# Patient Record
Sex: FEMALE | Race: WHITE | NOT HISPANIC OR LATINO | Employment: PART TIME | URBAN - METROPOLITAN AREA
[De-identification: names, ages, dates, MRNs, and addresses within clinical notes are randomized per-mention and may not be internally consistent; named-entity substitution may affect disease eponyms.]

---

## 2017-01-06 DIAGNOSIS — J45.909 ASTHMA: ICD-10-CM

## 2017-01-06 DIAGNOSIS — J30.81 ALLERGIC RHINITIS DUE TO ANIMALS: Primary | ICD-10-CM

## 2017-01-09 RX ORDER — MONTELUKAST SODIUM 10 MG/1
TABLET ORAL
Qty: 30 TABLET | Refills: 11 | Status: SHIPPED
Start: 2017-01-09 | End: 2018-10-08

## 2017-01-12 DIAGNOSIS — B37.31 CANDIDIASIS OF VULVA AND VAGINA: Primary | ICD-10-CM

## 2017-01-12 RX ORDER — FLUCONAZOLE 150 MG/1
TABLET ORAL
Qty: 3 TABLET | Refills: 0 | Status: SHIPPED | OUTPATIENT
Start: 2017-01-12 | End: 2017-02-03

## 2017-01-24 ENCOUNTER — CARE COORDINATION (OUTPATIENT)
Dept: PULMONOLOGY | Facility: CLINIC | Age: 32
End: 2017-01-24

## 2017-01-24 DIAGNOSIS — J47.9 BRONCHIECTASIS WITHOUT ACUTE EXACERBATION (H): Primary | ICD-10-CM

## 2017-01-24 RX ORDER — PREDNISONE 10 MG/1
TABLET ORAL
Qty: 20 TABLET | Refills: 0 | Status: SHIPPED | OUTPATIENT
Start: 2017-01-24 | End: 2017-02-10

## 2017-01-24 NOTE — PROGRESS NOTES
[facility]  January 24, 2017    Kehr, Kristen M    CF Provider: Kade Luu MD    Caller: Patient     Clinical information:  Michelle Epstein called with complaint of waking up with a cold this morning and having difficulty breathing and her cough is worse. She is requesting prednisone at this time.       Plan: Prednisone 30 mg for 3 days, 20 mg for 3 days, 10 mg for 3 days, 5 mg for 3 days, then stop.      Call back with any new or worsening symptoms/concerns.    Caller verbalized understanding of plan and agrees with advice given.

## 2017-01-27 ENCOUNTER — OFFICE VISIT (OUTPATIENT)
Dept: OBGYN | Facility: OTHER | Age: 32
End: 2017-01-27
Payer: COMMERCIAL

## 2017-01-27 VITALS
HEART RATE: 88 BPM | WEIGHT: 176 LBS | SYSTOLIC BLOOD PRESSURE: 98 MMHG | DIASTOLIC BLOOD PRESSURE: 70 MMHG | HEIGHT: 65 IN | BODY MASS INDEX: 29.32 KG/M2

## 2017-01-27 DIAGNOSIS — Z00.00 ROUTINE GENERAL MEDICAL EXAMINATION AT A HEALTH CARE FACILITY: Primary | ICD-10-CM

## 2017-01-27 DIAGNOSIS — Z30.011 ENCOUNTER FOR INITIAL PRESCRIPTION OF CONTRACEPTIVE PILLS: ICD-10-CM

## 2017-01-27 DIAGNOSIS — Z32.00 PREGNANCY EXAMINATION OR TEST, PREGNANCY UNCONFIRMED: ICD-10-CM

## 2017-01-27 LAB — BETA HCG QUAL IFA URINE: NEGATIVE

## 2017-01-27 PROCEDURE — 87624 HPV HI-RISK TYP POOLED RSLT: CPT | Performed by: OBSTETRICS & GYNECOLOGY

## 2017-01-27 PROCEDURE — G0145 SCR C/V CYTO,THINLAYER,RESCR: HCPCS | Performed by: OBSTETRICS & GYNECOLOGY

## 2017-01-27 PROCEDURE — 84703 CHORIONIC GONADOTROPIN ASSAY: CPT | Performed by: OBSTETRICS & GYNECOLOGY

## 2017-01-27 PROCEDURE — 99385 PREV VISIT NEW AGE 18-39: CPT | Performed by: OBSTETRICS & GYNECOLOGY

## 2017-01-27 RX ORDER — NORETHINDRONE ACETATE AND ETHINYL ESTRADIOL 1MG-20(21)
1 KIT ORAL DAILY
Qty: 84 TABLET | Refills: 0 | Status: SHIPPED | OUTPATIENT
Start: 2017-01-27 | End: 2017-04-06

## 2017-01-27 ASSESSMENT — PAIN SCALES - GENERAL: PAINLEVEL: NO PAIN (0)
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
## 2017-01-27 NOTE — NURSING NOTE
"Chief Complaint   Patient presents with     Physical     Pap is due-  remind about Asthma Health Maint. -- already had flu shot       Initial BP 98/70 mmHg  Pulse 88  Ht 5' 5\" (1.651 m)  Wt 176 lb (79.833 kg)  BMI 29.29 kg/m2 Estimated body mass index is 29.29 kg/(m^2) as calculated from the following:    Height as of this encounter: 5' 5\" (1.651 m).    Weight as of this encounter: 176 lb (79.833 kg).  BP completed using cuff size: regular      "

## 2017-01-27 NOTE — PATIENT INSTRUCTIONS
PREVENTIVE HEALTH RECOMMENDATIONS:   Get a physical every year.  A pap test is important to have done starting at age 21 and then every three years as long as your pap is normal.  When you receive the results of your pap test it will include when you need to have your next pap test.    You should be tested each year for chlamydia and gonorrhea if you are aged 16-25 and if you have had a new sexual partner since you were last tested.   Vaccines: Get a flu shot each year.   Eat at least 8-10 servings of fruits and vegetables daily.  Eat whole-grain bread and cereal, whole-wheat pasta and brown rice instead of white grains and white rice.   For bone health: Eat calcium-rich foods (dairy products) or take calcium pills (500 to 600 mg with vitamin D) twice a day with food.   Exercise for an average of 30 minutes a day, 5 days of the week. It can be as simple as taking a brisk walk.  This will help you control your weight and prevent many diseases.   Limit alcohol to one drink per day.   Don't smoke and limit your exposure to second hand smoke.  If you smoke consider making a plan to quit. Go to sones and clink on   [employer]  for help   Wear sunscreen with at least SPF15 to prevent skin damage and skin cancer.   Brush your teeth twice a day and floss once a day and see your dentist twice a year for an exam and cleaning.   Have a great year and I will look forward to seeing you next year.   Myranda Glass, DO

## 2017-01-31 DIAGNOSIS — Z00.00 ROUTINE GENERAL MEDICAL EXAMINATION AT A HEALTH CARE FACILITY: ICD-10-CM

## 2017-01-31 LAB
ALBUMIN SERPL-MCNC: 3.4 G/DL (ref 3.4–5)
ALP SERPL-CCNC: 39 U/L (ref 40–150)
ALT SERPL W P-5'-P-CCNC: 19 U/L (ref 0–50)
ANION GAP SERPL CALCULATED.3IONS-SCNC: 6 MMOL/L (ref 3–14)
AST SERPL W P-5'-P-CCNC: 7 U/L (ref 0–45)
BILIRUB SERPL-MCNC: 0.3 MG/DL (ref 0.2–1.3)
BUN SERPL-MCNC: 12 MG/DL (ref 7–30)
CALCIUM SERPL-MCNC: 8.4 MG/DL (ref 8.5–10.1)
CHLORIDE SERPL-SCNC: 108 MMOL/L (ref 94–109)
CHOLEST SERPL-MCNC: 140 MG/DL
CO2 SERPL-SCNC: 31 MMOL/L (ref 20–32)
CREAT SERPL-MCNC: 0.86 MG/DL (ref 0.52–1.04)
ERYTHROCYTE [DISTWIDTH] IN BLOOD BY AUTOMATED COUNT: 16.1 % (ref 10–15)
GFR SERPL CREATININE-BSD FRML MDRD: 76 ML/MIN/1.7M2
GLUCOSE SERPL-MCNC: 89 MG/DL (ref 70–99)
HCT VFR BLD AUTO: 38.6 % (ref 35–47)
HDLC SERPL-MCNC: 42 MG/DL
HGB BLD-MCNC: 12.2 G/DL (ref 11.7–15.7)
LDLC SERPL CALC-MCNC: 71 MG/DL
MCH RBC QN AUTO: 28.4 PG (ref 26.5–33)
MCHC RBC AUTO-ENTMCNC: 31.6 G/DL (ref 31.5–36.5)
MCV RBC AUTO: 90 FL (ref 78–100)
NONHDLC SERPL-MCNC: 98 MG/DL
PLATELET # BLD AUTO: 284 10E9/L (ref 150–450)
POTASSIUM SERPL-SCNC: 4.1 MMOL/L (ref 3.4–5.3)
PROT SERPL-MCNC: 6.7 G/DL (ref 6.8–8.8)
RBC # BLD AUTO: 4.29 10E12/L (ref 3.8–5.2)
SODIUM SERPL-SCNC: 145 MMOL/L (ref 133–144)
TRIGL SERPL-MCNC: 137 MG/DL
TSH SERPL DL<=0.005 MIU/L-ACNC: 2.07 MU/L (ref 0.4–4)
WBC # BLD AUTO: 10.2 10E9/L (ref 4–11)

## 2017-01-31 PROCEDURE — 85027 COMPLETE CBC AUTOMATED: CPT | Performed by: OBSTETRICS & GYNECOLOGY

## 2017-01-31 PROCEDURE — 36415 COLL VENOUS BLD VENIPUNCTURE: CPT | Performed by: OBSTETRICS & GYNECOLOGY

## 2017-01-31 PROCEDURE — 80053 COMPREHEN METABOLIC PANEL: CPT | Performed by: OBSTETRICS & GYNECOLOGY

## 2017-01-31 PROCEDURE — 80061 LIPID PANEL: CPT | Performed by: OBSTETRICS & GYNECOLOGY

## 2017-01-31 PROCEDURE — 84443 ASSAY THYROID STIM HORMONE: CPT | Performed by: OBSTETRICS & GYNECOLOGY

## 2017-02-01 LAB
COPATH REPORT: NORMAL
PAP: NORMAL

## 2017-02-01 ASSESSMENT — ACTIVITIES OF DAILY LIVING (ADL)
ARE_THERE_CARBON_MONOXIDE_DETECTORS_IN_YOUR_HOME?: Y
ARE_THERE_SMOKE_DETECTORS_IN_YOUR_HOME?: Y
DO_MEMBERS_OF_YOUR_HOUSEHOLD_USE_SUNSCREEN?: Y
ARE_THERE_FIREARMS_IN_YOUR_HOME?: N
DO_MEMBERS_OF_YOUR_HOUSEHOLD_USE_SAFETY_HELMETS?: Y
DO_MEMBERS_OF_YOUR_HOUSEHOLD_WEAR_SEAT_BELTS?: Y

## 2017-02-01 ASSESSMENT — ENCOUNTER SYMPTOMS
TASTE DISTURBANCE: 0
HOARSE VOICE: 0
SMELL DISTURBANCE: 0
HEMOPTYSIS: 0
SPUTUM PRODUCTION: 1
RESPIRATORY PAIN: 0
SINUS PAIN: 1
WHEEZING: 1
SHORTNESS OF BREATH: 0
NECK MASS: 0
TROUBLE SWALLOWING: 0
COUGH DISTURBING SLEEP: 1
SINUS CONGESTION: 1
SORE THROAT: 0
DYSPNEA ON EXERTION: 1
COUGH: 1
POSTURAL DYSPNEA: 1
SNORES LOUDLY: 0

## 2017-02-02 LAB
FINAL DIAGNOSIS: NORMAL
HPV HR 12 DNA CVX QL NAA+PROBE: NEGATIVE
HPV16 DNA SPEC QL NAA+PROBE: NEGATIVE
HPV18 DNA SPEC QL NAA+PROBE: NEGATIVE
SPECIMEN DESCRIPTION: NORMAL

## 2017-02-03 DIAGNOSIS — B37.31 YEAST INFECTION OF THE VAGINA: Primary | ICD-10-CM

## 2017-02-06 RX ORDER — FLUCONAZOLE 150 MG/1
TABLET ORAL
Qty: 3 TABLET | Refills: 0 | Status: SHIPPED | OUTPATIENT
Start: 2017-02-06 | End: 2017-05-03

## 2017-02-09 DIAGNOSIS — J32.9 SINUSITIS, CHRONIC: Primary | ICD-10-CM

## 2017-02-09 RX ORDER — METHYLPREDNISOLONE 4 MG
TABLET, DOSE PACK ORAL
Qty: 21 TABLET | Refills: 1 | Status: SHIPPED | OUTPATIENT
Start: 2017-02-09 | End: 2017-04-05

## 2017-02-09 RX ORDER — DOXYCYCLINE 100 MG/1
100 CAPSULE ORAL 2 TIMES DAILY
Qty: 28 CAPSULE | Refills: 0 | Status: SHIPPED | OUTPATIENT
Start: 2017-02-09 | End: 2017-02-13 | Stop reason: ALTCHOICE

## 2017-02-10 ENCOUNTER — OFFICE VISIT (OUTPATIENT)
Dept: PULMONOLOGY | Facility: CLINIC | Age: 32
End: 2017-02-10
Attending: INTERNAL MEDICINE
Payer: COMMERCIAL

## 2017-02-10 VITALS
TEMPERATURE: 98.9 F | HEIGHT: 65 IN | RESPIRATION RATE: 16 BRPM | BODY MASS INDEX: 29.27 KG/M2 | HEART RATE: 109 BPM | OXYGEN SATURATION: 97 % | WEIGHT: 175.7 LBS | DIASTOLIC BLOOD PRESSURE: 88 MMHG | SYSTOLIC BLOOD PRESSURE: 128 MMHG

## 2017-02-10 DIAGNOSIS — J47.9 BRONCHIECTASIS (H): ICD-10-CM

## 2017-02-10 DIAGNOSIS — J47.9 ADULT BRONCHIECTASIS (H): Primary | ICD-10-CM

## 2017-02-10 DIAGNOSIS — J47.1 BRONCHIECTASIS WITH ACUTE EXACERBATION (H): ICD-10-CM

## 2017-02-10 LAB
BACTERIA SPEC CULT: NORMAL
MICRO REPORT STATUS: NORMAL
SPECIMEN SOURCE: NORMAL

## 2017-02-10 PROCEDURE — 87070 CULTURE OTHR SPECIMN AEROBIC: CPT | Performed by: INTERNAL MEDICINE

## 2017-02-10 PROCEDURE — 99212 OFFICE O/P EST SF 10 MIN: CPT | Mod: ZF

## 2017-02-10 RX ORDER — PREDNISONE 10 MG/1
TABLET ORAL
Qty: 63 TABLET | Refills: 0 | Status: SHIPPED
Start: 2017-02-10 | End: 2017-08-01

## 2017-02-10 RX ORDER — LEVOFLOXACIN 750 MG/1
750 TABLET, FILM COATED ORAL DAILY
Qty: 14 TABLET | Refills: 0 | Status: SHIPPED | OUTPATIENT
Start: 2017-02-10 | End: 2017-02-24

## 2017-02-10 RX ORDER — TOBRAMYCIN INHALATION SOLUTION 300 MG/5ML
300 INHALANT RESPIRATORY (INHALATION) 2 TIMES DAILY
Qty: 280 ML | Refills: 11 | Status: SHIPPED | OUTPATIENT
Start: 2017-02-10 | End: 2019-01-24

## 2017-02-10 ASSESSMENT — PAIN SCALES - GENERAL: PAINLEVEL: NO PAIN (0)

## 2017-02-10 NOTE — MR AVS SNAPSHOT
After Visit Summary   2/10/2017    Michelle Epstein    MRN: [id number]           Patient Information     Date Of Birth          1985        Visit Information        Provider Department      2/10/2017 3:50 PM Kade Luu MD [facility]        Today's Diagnoses     Adult bronchiectasis (H)    -  1     Bronchiectasis with acute exacerbation (H)           Care Instructions    Continue doxycycline and medrol dose pack.  Call the CF office Monday if you are not feeling better.    Restart RAJI nebs twice daily, on 28 days, off 28 days.    Use azithromycin whenever you are not on another oral antibiotic.      If you get sick on your [place] trip:  Levofloxacin 750mg daily for 14 days  Prednisone 10 mg tab: 60mg daily for 3 days, then 50mg daily for 3 days, then 40mg daily for 3 days, then 30mg daily for 3 days, then 20mg daily for 3 days, then 10mg daily for 3 days then stop.        Follow-ups after your visit        Follow-up notes from your care team     Return in about 3 months (around 5/10/2017).      Your next 10 appointments already scheduled     May 12, 2017  3:00 PM   CF LOOP with  PFL CF   [facility] Pulmonary Function Testing ([facility])    [address] [place] [id number] [phone]            May 12, 2017  3:50 PM   (Arrive by 3:35 PM)   RETURN CYSTIC FIBROSIS VISIT with Kade Luu MD   [facility] ([facility])    [address] [place] [id number] [phone]              Future tests that were ordered for you today     Open Future Orders        Priority Expected Expires Ordered    RESPIRATORY FLOW VOLUME LOOP Routine 5/10/2017 6/10/2017 2/10/2017            Who to contact     If you have questions or need follow up information about today's clinic visit or your schedule please contact [facility] directly at [phone].  Normal or non-critical lab and imaging results will be communicated to you by MyChart, letter or phone within 4 business days after the clinic has received the results. If you do not hear from us within 7 days, please contact the clinic through [employer] or phone. If you have a critical or abnormal lab result, we will notify you by phone as soon as possible.  Submit refill requests through [employer] or call your pharmacy and they will forward the refill request to us. Please allow 3 business days for your refill to be completed.          Additional Information About Your Visit        [employer] Information     [employer] gives you secure access to your electronic health record. If you see a primary care provider, you can also send messages to your care team and make appointments. If you have questions, please call your primary care clinic.  If you do not have a primary care provider, please call [phone] and they will assist you.        Care EveryWhere ID     This is your Care EveryWhere ID. This could be used by other organizations to access your [facility] medical records  [id number]        Your Vitals Were     Pulse Temperature Respirations Height BMI (Body Mass Index) Pulse Oximetry    109 98.9  F (37.2  C) (Oral) 16 1.651 m (5' 5\") 29.24 kg/m2 97%    Last Period                   01/01/2017            Blood Pressure from Last 3 Encounters:   02/10/17 128/88   01/27/17 98/70   10/17/16 114/79    Weight from Last 3 Encounters:   02/10/17 79.697 kg (175 lb 11.2 oz)   01/27/17 79.833 kg (176 lb)   10/17/16 81.012 kg (178 lb 9.6 oz)              We Performed the Following     Throat Culture Aerobic Bacterial          Today's Medication Changes          These changes are accurate as of: 2/10/17  5:09 PM.  If you have any questions, ask your nurse or doctor.               Start taking these medicines.        Dose/Directions    levofloxacin 750 MG tablet "   Commonly known as:  LEVAQUIN   Used for:  Adult bronchiectasis (H), Bronchiectasis with acute exacerbation (H)   Started by:  Kade Luu MD        Dose:  750 mg   Take 1 tablet (750 mg) by mouth daily for 14 days   Quantity:  14 tablet   Refills:  0       tobramycin (PF) 300 MG/5ML neb solution   Commonly known as:  RAJI   Used for:  Adult bronchiectasis (H), Bronchiectasis with acute exacerbation (H)   Started by:  Kade Luu MD        Dose:  300 mg   Take 5 mLs (300 mg) by nebulization 2 times daily 28 days on, 28 days off   Quantity:  280 mL   Refills:  11         These medicines have changed or have updated prescriptions.        Dose/Directions    montelukast 10 MG tablet   Commonly known as:  SINGULAIR   This may have changed:  Another medication with the same name was removed. Continue taking this medication, and follow the directions you see here.   Used for:  Allergic rhinitis due to animals, Asthma   Changed by:  Kade Luu MD        TAKE ONE TABLET BY MOUTH IN THE EVENING   Quantity:  30 tablet   Refills:  11       predniSONE 10 MG tablet   Commonly known as:  DELTASONE   This may have changed:  additional instructions   Used for:  Adult bronchiectasis (H), Bronchiectasis with acute exacerbation (H)   Changed by:  Kade Luu MD        60mg daily for 3 days, then 50mg daily for 3 days, then 40mg daily for 3 days, then 30mg daily for 3 days, then 20mg daily for 3 days, then 10mg daily for 3 days then stop.   Quantity:  63 tablet   Refills:  0            Where to get your medicines      These medications were sent to [id number] - [place] - [address]  [address] [place] [address]     Phone:  [phone]    - levofloxacin 750 MG tablet  - predniSONE 10 MG tablet      These medications were sent to [facility] - [place], [place] - [address]   [address] [address]    Hours:   Mon-Fri 8:30am-5:00pm Toll Free [phone] Phone:  [phone]    - tobramycin (PF) 300 MG/5ML neb solution             Primary Care Provider Office Phone # Fax #    Georgia M Kehr, PA-C [phone]       [facility] [address] [place] [address] [address]        Thank you!     Thank you for choosing [facility]  for your care. Our goal is always to provide you with excellent care. Hearing back from our patients is one way we can continue to improve our services. Please take a few minutes to complete the written survey that you may receive in the mail after your visit with us. Thank you!             Your Updated Medication List - Protect others around you: Learn how to safely use, store and throw away your medicines at www.disposemymeds.org.          This list is accurate as of: 2/10/17  5:09 PM.  Always use your most recent med list.                   Brand Name Dispense Instructions for use    * albuterol 108 (90 BASE) MCG/ACT Inhaler    albuterol    8.5 g    Inhale 2 puffs into the lungs every 6 hours as needed for shortness of breath / dyspnea or wheezing       * albuterol (2.5 MG/3ML) 0.083% neb solution     360 mL    TAKE ONE VIAL (2.5MG) BY NEBULIZATION FOUR TIMES A DAY       azelastine 0.1 % spray    ASTELIN    30 mL    SPRAY 1-2 SPRAYS IN EACH NOSTRIL TWO TIMES A DAY       azithromycin 500 MG tablet    ZITHROMAX    14 tablet    Take 1 tablet (500 mg) by mouth Every Mon, Wed, Fri Morning Monday, Wednesday, Friday 1 tab       budesonide 1 MG/2ML Susp neb solution    PULMICORT    120 ampule    Nebulized twice daily.       cromolyn 20 MG/2ML neb solution    INTAL    120 mL    Take 2 mLs (20 mg) by nebulization 2 times daily       doxycycline 100 MG capsule    VIBRAMYCIN    28 capsule    Take 1 capsule (100 mg) by mouth 2 times daily for 14 days       fluconazole 150 MG tablet    DIFLUCAN    3 tablet    TAKE 1 TABLET BY MOUTH EVERY 7 DAYS. TAKE WEEKLY  WHILE ON ANTIBIOTICS       levofloxacin 750 MG tablet    LEVAQUIN    14 tablet    Take 1 tablet (750 mg) by mouth daily for 14 days       methylPREDNISolone 4 MG tablet    MEDROL DOSEPAK    21 tablet    Take as directed per patient instruction card       montelukast 10 MG tablet    SINGULAIR    30 tablet    TAKE ONE TABLET BY MOUTH IN THE EVENING       norethindrone-ethinyl estradiol 1-20 MG-MCG per tablet    JUNEL FE 1/20    84 tablet    Take 1 tablet by mouth daily       omeprazole 20 MG CR capsule    priLOSEC    60 capsule    Take 1 capsule (20 mg) by mouth 2 times daily       predniSONE 10 MG tablet    DELTASONE    63 tablet    60mg daily for 3 days, then 50mg daily for 3 days, then 40mg daily for 3 days, then 30mg daily for 3 days, then 20mg daily for 3 days, then 10mg daily for 3 days then stop.       sodium chloride 0.65 % nasal spray    [employer] SALINE NASAL SPRAY    30 mL    Spray 1-2 sprays into both nostrils every 2 hours as needed for congestion (irrigation)       tobramycin (PF) 300 MG/5ML neb solution    RAJI    280 mL    Take 5 mLs (300 mg) by nebulization 2 times daily 28 days on, 28 days off       * Notice:  This list has 2 medication(s) that are the same as other medications prescribed for you. Read the directions carefully, and ask your doctor or other care provider to review them with you.
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
## 2017-02-10 NOTE — PATIENT INSTRUCTIONS
Continue doxycycline and medrol dose pack.  Call the CF office Monday if you are not feeling better.    Restart RAJI nebs twice daily, on 28 days, off 28 days.    Use azithromycin whenever you are not on another oral antibiotic.      If you get sick on your [place] trip:  Levofloxacin 750mg daily for 14 days  Prednisone 10 mg tab: 60mg daily for 3 days, then 50mg daily for 3 days, then 40mg daily for 3 days, then 30mg daily for 3 days, then 20mg daily for 3 days, then 10mg daily for 3 days then stop.

## 2017-02-10 NOTE — NURSING NOTE
Chief Complaint   Patient presents with     Bronchiectasis     Follow up on alin and her lung issues     Prosper Estrada CMA at 3:45 PM on 2/10/2017
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
## 2017-02-12 LAB
BACTERIA SPEC CULT: NORMAL
MICRO REPORT STATUS: NORMAL
SPECIMEN SOURCE: NORMAL

## 2017-02-13 ENCOUNTER — TELEPHONE (OUTPATIENT)
Dept: PULMONOLOGY | Facility: CLINIC | Age: 32
End: 2017-02-13

## 2017-02-13 NOTE — TELEPHONE ENCOUNTER
[facility]  February 13, 2017    Kehr, Kristen M    CF Provider: Kade Luu MD    Caller: Patient     Clinical information:  Michelle Epstein called to report that her sinuses are better. Completing course of Doxycycline and Medrol dose paul. However, reports that her breathing and cough are the same as in clinic last week. Not doing any better.    Plan:   Discussed with Dr Luu:  Stop Doxycycline.  Start Clindamycin Q6H x10 days.  With Clindamycin, be sure to eat yogurt or drink Kefir.  Call back with any new or worsening symptoms/concerns.    Voicemail message left for Michelle with the above plan.

## 2017-02-23 ENCOUNTER — TELEPHONE (OUTPATIENT)
Dept: PULMONOLOGY | Facility: CLINIC | Age: 32
End: 2017-02-23

## 2017-02-23 NOTE — TELEPHONE ENCOUNTER
Prior Authorization Retail Medication Request  Medication/Dose: Omeprazole 20mg   Diagnosis and ICD code: E84.0  New/Renewal/Insurance Change PA:   Previously Tried and Failed Therapies:     Insurance ID (if provided):   Insurance Phone (if provided):     Any additional info from fax request:     If you received a fax notification from an outside Pharmacy:  Pharmacy Name:[facility]   Pharmacy #:[phone]  Pharmacy Fax:[phone]

## 2017-03-03 NOTE — TELEPHONE ENCOUNTER
[facility] Prior Authorization Team   Phone: [phone]  Fax: [phone]    PA Initiation    Medication: Omeprazole 20mg   Insurance Company: [employer] - Phone [phone] Fax [phone]  Pharmacy Filling the Rx: [id number] - [place] - [address]  Filling Pharmacy Phone: [phone]  Filling Pharmacy Fax:    Start Date: 3/3/2017        Clinic notes included.

## 2017-03-06 NOTE — TELEPHONE ENCOUNTER
Prior Authorization Approval    Authorization Effective Date: 3/3/2017  Authorization Expiration Date: 3/3/2018  Medication: Omeprazole 20mg  - APPROVED  Approved Dose/Quantity:   Reference #: [id number]   Insurance Company: [employer] - Phone [phone] Fax [phone]  Expected CoPay: $4.68     CoPay Card Available:      Foundation Assistance Needed:    Which Pharmacy is filling the prescription (Not needed for infusion/clinic administered): [id number] - [place] - [address]  Pharmacy Notified: Yes  Patient Notified: Yes

## 2017-03-12 ENCOUNTER — MYC REFILL (OUTPATIENT)
Dept: OBGYN | Facility: OTHER | Age: 32
End: 2017-03-12

## 2017-03-12 DIAGNOSIS — Z30.011 ENCOUNTER FOR INITIAL PRESCRIPTION OF CONTRACEPTIVE PILLS: ICD-10-CM

## 2017-03-12 RX ORDER — NORETHINDRONE ACETATE AND ETHINYL ESTRADIOL 1MG-20(21)
1 KIT ORAL DAILY
Qty: 84 TABLET | Refills: 0 | Status: CANCELLED | OUTPATIENT
Start: 2017-03-12

## 2017-03-13 LAB
EXPTIME-PRE: 10.36 SEC
FEF2575-%PRED-PRE: 36 %
FEF2575-PRE: 1.28 L/SEC
FEF2575-PRED: 3.55 L/SEC
FEFMAX-%PRED-PRE: 74 %
FEFMAX-PRE: 5.31 L/SEC
FEFMAX-PRED: 7.17 L/SEC
FEV1-%PRED-PRE: 70 %
FEV1-PRE: 2.3 L
FEV1FEV6-PRE: 72 %
FEV1FEV6-PRED: 85 %
FEV1FVC-PRE: 71 %
FEV1FVC-PRED: 84 %
FIFMAX-PRE: 2.54 L/SEC
FVC-%PRED-PRE: 83 %
FVC-PRE: 3.27 L
FVC-PRED: 3.92 L

## 2017-03-13 NOTE — TELEPHONE ENCOUNTER
Message from [employer]:  Original authorizing provider: DO Michelle Sanchez would like a refill of the following medications:  norethindrone-ethinyl estradiol (JUNEL FE 1/20) 1-20 MG-MCG per tablet [Myranda Glass DO]    Preferred pharmacy: [facility][id number] [address]    Comment:

## 2017-03-13 NOTE — TELEPHONE ENCOUNTER
norethindrone-ethinyl estradiol (JUNEL FE 1/20) 1-20 MG-MCG per tablet      Last Written Prescription Date: 1/27/17  Last Fill Quantity: 84,  # refills: 0   Last Office Visit with FMRICARDO, DALYP or [facility] prescribing provider: 1/27/17 MOUNIKA

## 2017-04-05 ENCOUNTER — TELEPHONE (OUTPATIENT)
Dept: PULMONOLOGY | Facility: CLINIC | Age: 32
End: 2017-04-05

## 2017-04-05 DIAGNOSIS — J32.9 SINUSITIS, CHRONIC: ICD-10-CM

## 2017-04-05 DIAGNOSIS — J47.9 BRONCHIECTASIS WITHOUT ACUTE EXACERBATION (H): ICD-10-CM

## 2017-04-05 RX ORDER — CLINDAMYCIN HCL 300 MG
300 CAPSULE ORAL 4 TIMES DAILY
Qty: 40 CAPSULE | Refills: 0 | Status: SHIPPED | OUTPATIENT
Start: 2017-04-05 | End: 2018-01-04

## 2017-04-05 RX ORDER — METHYLPREDNISOLONE 4 MG
TABLET, DOSE PACK ORAL
Qty: 21 TABLET | Refills: 1 | Status: SHIPPED | OUTPATIENT
Start: 2017-04-05 | End: 2018-01-04

## 2017-04-05 NOTE — TELEPHONE ENCOUNTER
"[facility]  April 5, 2017    Kehr, Kristen M  Provider: Kade Luu MD    Caller: Patient     Clinical information:  Michelle Epstein called with complaint of another sinus infection that started last night. Pain in forehead and below her eyes. Mucus yellow/green in color. No fevers. Vesting twice a day. Received course of Doxycycline in February followed by course of Clindamycin. Reports that the Doxycycline does not work for her and that she does not like Levaquin. Previous symptoms cleared with Clindamycin and Medrol dosepak. Not doing sinus rinses as \"they end up in my ears\".     Plan:   Discussed with Dr Luu:  Ok to report course of Clindamycin and Medrol dosepak.  Call back with any new or worsening symptoms/concerns.    Caller verbalized understanding of plan and agrees with advice given.     "

## 2017-04-06 ENCOUNTER — MYC REFILL (OUTPATIENT)
Dept: OBGYN | Facility: OTHER | Age: 32
End: 2017-04-06

## 2017-04-06 DIAGNOSIS — Z30.011 ENCOUNTER FOR INITIAL PRESCRIPTION OF CONTRACEPTIVE PILLS: ICD-10-CM

## 2017-04-06 RX ORDER — NORETHINDRONE ACETATE AND ETHINYL ESTRADIOL 1MG-20(21)
1 KIT ORAL DAILY
Qty: 84 TABLET | Refills: 2 | Status: SHIPPED | OUTPATIENT
Start: 2017-04-06 | End: 2018-02-21

## 2017-04-06 NOTE — TELEPHONE ENCOUNTER
Prescription approved per [facility].  Responded via [employer].   Rebeca Russell RN

## 2017-04-06 NOTE — TELEPHONE ENCOUNTER
Message from [employer]:  Original authorizing provider: DO Michelle Sanchezhina would like a refill of the following medications:  norethindrone-ethinyl estradiol (JUNEL FE 1/20) 1-20 MG-MCG per tablet [Myranda Glass DO]    Preferred pharmacy: [facility] #[id number] [address]    Comment:  Please send refills to [facility] in [place]. My last box says no refills and the pharmacy has also sent a request and not heard back [phone]

## 2017-04-06 NOTE — TELEPHONE ENCOUNTER
junel      Last Written Prescription Date: 01/27/17  Last Fill Quantity: 84,  # refills: 0   Last Office Visit with G, [facility] or [facility] prescribing provider: 01/27/17

## 2017-04-20 ENCOUNTER — TRANSFERRED RECORDS (OUTPATIENT)
Dept: HEALTH INFORMATION MANAGEMENT | Facility: CLINIC | Age: 32
End: 2017-04-20

## 2017-06-15 DIAGNOSIS — J45.40 MODERATE PERSISTENT ASTHMA WITHOUT COMPLICATION: ICD-10-CM

## 2017-06-16 RX ORDER — MONTELUKAST SODIUM 10 MG/1
TABLET ORAL
Qty: 30 TABLET | Refills: 11 | Status: SHIPPED | OUTPATIENT
Start: 2017-06-16 | End: 2018-01-04

## 2017-06-28 ENCOUNTER — TELEPHONE (OUTPATIENT)
Dept: PULMONOLOGY | Facility: CLINIC | Age: 32
End: 2017-06-28

## 2017-06-28 DIAGNOSIS — J32.9 SINUSITIS, CHRONIC: ICD-10-CM

## 2017-06-28 RX ORDER — METHYLPREDNISOLONE 4 MG
TABLET, DOSE PACK ORAL
Qty: 21 TABLET | Refills: 1 | Status: SHIPPED | OUTPATIENT
Start: 2017-06-28 | End: 2018-01-04

## 2017-06-28 RX ORDER — LEVOFLOXACIN 750 MG/1
750 TABLET, FILM COATED ORAL DAILY
Qty: 14 TABLET | Refills: 0 | Status: SHIPPED | OUTPATIENT
Start: 2017-06-28 | End: 2017-07-12

## 2017-06-28 NOTE — TELEPHONE ENCOUNTER
"[facility]  June 28, 2017    Kehr, Kristen M    Provider: Kade Luu MD    Caller: Patient     Clinical information:  Michelle BRUNO Kaynayeli called with complaint of \"having a harder time breathing.\" Complaining of chest tightness, thicker mucus and low grade fevers during the night. Has increased her nebs. Has an appointment scheduled for follow-up.    Plan:   Discussed with Dr Luu:  Levaquin 750 mg daily x2 weeks.  Medrol Dose-paul.  Call back with any new or worsening symptoms/concerns.    Voicemail left for Michelle with above plan.    "

## 2017-06-30 ENCOUNTER — TELEPHONE (OUTPATIENT)
Dept: PULMONOLOGY | Facility: CLINIC | Age: 32
End: 2017-06-30

## 2017-06-30 NOTE — TELEPHONE ENCOUNTER
Writer called patient to remind her to hold any antihistamines as well as the medication Zantac, one full week prior to her appointment with Dr. Bucio as they may skew any testing that is done during her visit. Patient verbalized understanding.

## 2017-08-01 DIAGNOSIS — J47.9 ADULT BRONCHIECTASIS (H): ICD-10-CM

## 2017-08-01 DIAGNOSIS — J47.1 BRONCHIECTASIS WITH ACUTE EXACERBATION (H): ICD-10-CM

## 2017-08-01 RX ORDER — PREDNISONE 10 MG/1
TABLET ORAL
Qty: 63 TABLET | Refills: 0 | Status: SHIPPED | OUTPATIENT
Start: 2017-08-01 | End: 2017-09-20

## 2017-08-18 DIAGNOSIS — J47.9 BRONCHIECTASIS WITHOUT COMPLICATION (H): ICD-10-CM

## 2017-08-18 DIAGNOSIS — J45.40 MODERATE PERSISTENT ASTHMA WITHOUT COMPLICATION: ICD-10-CM

## 2017-08-18 RX ORDER — CROMOLYN SODIUM 20 MG/2ML
20 INHALANT INTRABRONCHIAL 2 TIMES DAILY
Qty: 120 ML | Refills: 11 | Status: SHIPPED | OUTPATIENT
Start: 2017-08-18 | End: 2017-11-13

## 2017-09-20 ENCOUNTER — CARE COORDINATION (OUTPATIENT)
Dept: PULMONOLOGY | Facility: CLINIC | Age: 32
End: 2017-09-20

## 2017-09-20 DIAGNOSIS — J47.0 BRONCHIECTASIS WITH ACUTE LOWER RESPIRATORY INFECTION (H): Primary | ICD-10-CM

## 2017-09-20 DIAGNOSIS — J47.1 BRONCHIECTASIS WITH ACUTE EXACERBATION (H): ICD-10-CM

## 2017-09-20 DIAGNOSIS — J47.9 ADULT BRONCHIECTASIS (H): ICD-10-CM

## 2017-09-20 RX ORDER — LEVOFLOXACIN 750 MG/1
750 TABLET, FILM COATED ORAL DAILY
Qty: 21 TABLET | Refills: 0 | Status: SHIPPED | OUTPATIENT
Start: 2017-09-20 | End: 2017-09-20

## 2017-09-20 RX ORDER — PREDNISONE 10 MG/1
TABLET ORAL
Qty: 63 TABLET | Refills: 0 | Status: SHIPPED | OUTPATIENT
Start: 2017-09-20 | End: 2017-11-28

## 2017-09-20 RX ORDER — LEVOFLOXACIN 750 MG/1
750 TABLET, FILM COATED ORAL DAILY
Qty: 14 TABLET | Refills: 0 | Status: SHIPPED | OUTPATIENT
Start: 2017-09-20 | End: 2017-11-28

## 2017-09-20 NOTE — PROGRESS NOTES
[facility]  September 20, 2017    Kehr, Kristen M    CF Provider: Kade Luu MD    Caller: Patient     Clinical information:  Michelle Epstein called with complaint of feeling more SOB with chest tightness since Saturday, ( for 4 days).    Plan: Per Dr. Luu, levaquin 750 mg x 14 days, prednisone 60 mg for 3 days, 50 mg for 3 days, 40 mg for 3 days, 30 mg for 3 days, 20 mg for 3 days, 10 mg for 3 days, then stop.      Call back with any new or worsening symptoms/concerns.    Caller verbalized understanding of plan and agrees with advice given.

## 2017-10-19 DIAGNOSIS — J47.9 BRONCHIECTASIS WITHOUT ACUTE EXACERBATION (H): ICD-10-CM

## 2017-10-19 RX ORDER — ALBUTEROL SULFATE 0.83 MG/ML
SOLUTION RESPIRATORY (INHALATION)
Qty: 360 ML | Refills: 12 | Status: SHIPPED | OUTPATIENT
Start: 2017-10-19 | End: 2018-02-28

## 2017-10-31 ENCOUNTER — TELEPHONE (OUTPATIENT)
Dept: PULMONOLOGY | Facility: CLINIC | Age: 32
End: 2017-10-31

## 2017-10-31 DIAGNOSIS — J47.9 BRONCHIECTASIS WITHOUT ACUTE EXACERBATION (H): Primary | ICD-10-CM

## 2017-10-31 RX ORDER — PREDNISONE 20 MG/1
20 TABLET ORAL DAILY
Qty: 5 TABLET | Refills: 0 | Status: SHIPPED | OUTPATIENT
Start: 2017-10-31 | End: 2017-11-05

## 2017-10-31 RX ORDER — LEVOFLOXACIN 750 MG/1
750 TABLET, FILM COATED ORAL DAILY
Qty: 14 TABLET | Refills: 0 | Status: SHIPPED | OUTPATIENT
Start: 2017-10-31 | End: 2017-11-14

## 2017-10-31 NOTE — TELEPHONE ENCOUNTER
[facility]  October 31, 2017    Kehr, Kristen M    CF Provider: Kade Luu MD    Caller: Patient     Clinical information:  Michelle Epstein called with complaint of cold symptoms for the past two days. Her children have been sick with colds for the past two weeks. Complaints of wheezing, chest tightness. Mucus is thicker. Non-productive cough. Sinus head pain. No fevers. Last clinic visit: February 2017. Reportedly does not have any insurance in place until tomorrow.    Plan:   Discussed with Dr Cruz:  Levaquin 750 mg daily x14 days.  Prednisone 20 mg daily x5 days.  Call back with any new or worsening symptoms/concerns.    Caller verbalized understanding of plan and agrees with advice given.

## 2017-11-13 ENCOUNTER — MYC REFILL (OUTPATIENT)
Dept: PULMONOLOGY | Facility: CLINIC | Age: 32
End: 2017-11-13

## 2017-11-13 DIAGNOSIS — J47.9 BRONCHIECTASIS WITHOUT COMPLICATION (H): ICD-10-CM

## 2017-11-13 DIAGNOSIS — K21.9 GASTROESOPHAGEAL REFLUX DISEASE WITHOUT ESOPHAGITIS: ICD-10-CM

## 2017-11-13 DIAGNOSIS — J47.9 ADULT BRONCHIECTASIS (H): ICD-10-CM

## 2017-11-13 DIAGNOSIS — J47.9 BRONCHIECTASIS WITHOUT ACUTE EXACERBATION (H): ICD-10-CM

## 2017-11-13 DIAGNOSIS — J45.40 MODERATE PERSISTENT ASTHMA WITHOUT COMPLICATION: ICD-10-CM

## 2017-11-14 DIAGNOSIS — J32.4 CHRONIC PANSINUSITIS: ICD-10-CM

## 2017-11-14 RX ORDER — CROMOLYN SODIUM 20 MG/2ML
20 INHALANT INTRABRONCHIAL 2 TIMES DAILY
Qty: 120 ML | Refills: 11 | Status: SHIPPED | OUTPATIENT
Start: 2017-11-14 | End: 2018-09-06

## 2017-11-14 RX ORDER — AZITHROMYCIN 500 MG/1
500 TABLET, FILM COATED ORAL
Qty: 14 TABLET | Refills: 11 | Status: SHIPPED | OUTPATIENT
Start: 2017-11-15 | End: 2018-08-14

## 2017-11-14 RX ORDER — AZELASTINE 1 MG/ML
1 SPRAY, METERED NASAL 2 TIMES DAILY
Qty: 30 ML | Refills: 11 | Status: SHIPPED | OUTPATIENT
Start: 2017-11-14 | End: 2018-10-08

## 2017-11-14 NOTE — TELEPHONE ENCOUNTER
Message from [employer]:  Original authorizing provider: MD Michelle Kunz would like a refill of the following medications:  omeprazole (PRILOSEC) 20 MG capsule [Kade Luu MD]  azithromycin (ZITHROMAX) 500 MG tablet [Kade Luu MD]  azelastine (ASTELIN) 0.1 % spray [Kade Luu MD]  cromolyn (INTAL) 20 MG/2ML neb solution [Kade Luu MD]    Preferred pharmacy: [facility] #[id number] [facility] [address] [facility]    Comment:

## 2017-11-28 ENCOUNTER — TELEPHONE (OUTPATIENT)
Dept: PULMONOLOGY | Facility: CLINIC | Age: 32
End: 2017-11-28

## 2017-11-28 DIAGNOSIS — J47.1 BRONCHIECTASIS WITH ACUTE EXACERBATION (H): ICD-10-CM

## 2017-11-28 DIAGNOSIS — J47.0 BRONCHIECTASIS WITH ACUTE LOWER RESPIRATORY INFECTION (H): ICD-10-CM

## 2017-11-28 DIAGNOSIS — J47.9 ADULT BRONCHIECTASIS (H): ICD-10-CM

## 2017-11-28 RX ORDER — FLUTICASONE PROPIONATE 50 MCG
SPRAY, SUSPENSION (ML) NASAL
Qty: 16 G | Refills: 6 | Status: SHIPPED | OUTPATIENT
Start: 2017-11-28 | End: 2019-06-18

## 2017-11-28 RX ORDER — LEVOFLOXACIN 750 MG/1
750 TABLET, FILM COATED ORAL DAILY
Qty: 14 TABLET | Refills: 0 | Status: SHIPPED | OUTPATIENT
Start: 2017-11-28 | End: 2018-01-04

## 2017-11-28 RX ORDER — PREDNISONE 10 MG/1
TABLET ORAL
Qty: 63 TABLET | Refills: 0 | Status: SHIPPED | OUTPATIENT
Start: 2017-11-28 | End: 2018-01-04

## 2017-11-28 NOTE — TELEPHONE ENCOUNTER
[facility]  November 28, 2017    Kehr, Kristen M  Provider: Kade Luu MD    Caller: Patient     Clinical information:  Michelle Epstein called with complaint of not feeling well since last Thursday. More sinus congestion. Drainage is yellow/green in color. Lungs are feeling tight. Having to neb Q3H since last night. Received 2 week course of Levaquin and shortened course of prednisone on 10/31.  Felt that her sinus pain/issue was relieved with Levaquin but her lungs never loosened up as well.    Plan:   Discussed with Dr Luu:  Repeat Levaquin 750 mg daily x2 weeks.  Prednisone 60 mg daily x3 days, then decrease by 10 mg every 3 days.  Call back with any new or worsening symptoms/concerns.    Caller verbalized understanding of plan and agrees with advice given.

## 2017-12-01 DIAGNOSIS — B37.31 YEAST INFECTION OF THE VAGINA: ICD-10-CM

## 2017-12-01 RX ORDER — FLUCONAZOLE 150 MG/1
TABLET ORAL
Qty: 4 TABLET | Refills: 1 | Status: SHIPPED | OUTPATIENT
Start: 2017-12-01 | End: 2018-08-28

## 2018-01-02 ASSESSMENT — ENCOUNTER SYMPTOMS
HEMOPTYSIS: 0
DYSPNEA ON EXERTION: 1
SNORES LOUDLY: 1
COUGH: 1
SHORTNESS OF BREATH: 0
COUGH DISTURBING SLEEP: 1
SPUTUM PRODUCTION: 1
WHEEZING: 1
POSTURAL DYSPNEA: 1

## 2018-01-04 ENCOUNTER — OFFICE VISIT (OUTPATIENT)
Dept: PULMONOLOGY | Facility: CLINIC | Age: 33
End: 2018-01-04
Attending: INTERNAL MEDICINE
Payer: COMMERCIAL

## 2018-01-04 VITALS
RESPIRATION RATE: 16 BRPM | SYSTOLIC BLOOD PRESSURE: 105 MMHG | OXYGEN SATURATION: 98 % | HEART RATE: 103 BPM | BODY MASS INDEX: 29.86 KG/M2 | WEIGHT: 179.45 LBS | DIASTOLIC BLOOD PRESSURE: 76 MMHG

## 2018-01-04 DIAGNOSIS — J47.9 ADULT BRONCHIECTASIS (H): ICD-10-CM

## 2018-01-04 DIAGNOSIS — Z23 ENCOUNTER FOR IMMUNIZATION: Primary | ICD-10-CM

## 2018-01-04 DIAGNOSIS — J45.40 MODERATE PERSISTENT ASTHMA WITHOUT COMPLICATION: ICD-10-CM

## 2018-01-04 DIAGNOSIS — J47.9 ADULT BRONCHIECTASIS (H): Primary | ICD-10-CM

## 2018-01-04 DIAGNOSIS — R53.83 FATIGUE, UNSPECIFIED TYPE: ICD-10-CM

## 2018-01-04 DIAGNOSIS — L65.9 HAIR LOSS: ICD-10-CM

## 2018-01-04 DIAGNOSIS — M62.830 BACK MUSCLE SPASM: ICD-10-CM

## 2018-01-04 LAB
ERYTHROCYTE [DISTWIDTH] IN BLOOD BY AUTOMATED COUNT: 14.3 % (ref 10–15)
EXPTIME-PRE: 8.38 SEC
FEF2575-%PRED-PRE: 57 %
FEF2575-PRE: 2 L/SEC
FEF2575-PRED: 3.51 L/SEC
FEFMAX-%PRED-PRE: 91 %
FEFMAX-PRE: 6.53 L/SEC
FEFMAX-PRED: 7.17 L/SEC
FERRITIN SERPL-MCNC: 10 NG/ML (ref 12–150)
FEV1-%PRED-PRE: 81 %
FEV1-PRE: 2.64 L
FEV1FEV6-PRE: 75 %
FEV1FEV6-PRED: 85 %
FEV1FVC-PRE: 75 %
FEV1FVC-PRED: 84 %
FIFMAX-PRE: 3.85 L/SEC
FVC-%PRED-PRE: 90 %
FVC-PRE: 3.55 L
FVC-PRED: 3.91 L
HCT VFR BLD AUTO: 39.7 % (ref 35–47)
HETEROPH AB SER QL: NEGATIVE
HGB BLD-MCNC: 12.6 G/DL (ref 11.7–15.7)
IRON SATN MFR SERPL: 33 % (ref 15–46)
IRON SERPL-MCNC: 125 UG/DL (ref 35–180)
MCH RBC QN AUTO: 29.4 PG (ref 26.5–33)
MCHC RBC AUTO-ENTMCNC: 31.7 G/DL (ref 31.5–36.5)
MCV RBC AUTO: 93 FL (ref 78–100)
PLATELET # BLD AUTO: 262 10E9/L (ref 150–450)
RBC # BLD AUTO: 4.28 10E12/L (ref 3.8–5.2)
TIBC SERPL-MCNC: 374 UG/DL (ref 240–430)
TSH SERPL DL<=0.005 MIU/L-ACNC: 1.92 MU/L (ref 0.4–4)
WBC # BLD AUTO: 7.6 10E9/L (ref 4–11)

## 2018-01-04 PROCEDURE — 85027 COMPLETE CBC AUTOMATED: CPT | Performed by: INTERNAL MEDICINE

## 2018-01-04 PROCEDURE — 36415 COLL VENOUS BLD VENIPUNCTURE: CPT | Performed by: INTERNAL MEDICINE

## 2018-01-04 PROCEDURE — 90686 IIV4 VACC NO PRSV 0.5 ML IM: CPT | Mod: ZF | Performed by: INTERNAL MEDICINE

## 2018-01-04 PROCEDURE — 86308 HETEROPHILE ANTIBODY SCREEN: CPT | Performed by: INTERNAL MEDICINE

## 2018-01-04 PROCEDURE — 83550 IRON BINDING TEST: CPT | Performed by: INTERNAL MEDICINE

## 2018-01-04 PROCEDURE — 25000128 H RX IP 250 OP 636: Mod: ZF | Performed by: INTERNAL MEDICINE

## 2018-01-04 PROCEDURE — G0463 HOSPITAL OUTPT CLINIC VISIT: HCPCS | Mod: ZF

## 2018-01-04 PROCEDURE — 83540 ASSAY OF IRON: CPT | Performed by: INTERNAL MEDICINE

## 2018-01-04 PROCEDURE — G0008 ADMIN INFLUENZA VIRUS VAC: HCPCS

## 2018-01-04 PROCEDURE — 84443 ASSAY THYROID STIM HORMONE: CPT | Performed by: INTERNAL MEDICINE

## 2018-01-04 PROCEDURE — 82728 ASSAY OF FERRITIN: CPT | Performed by: INTERNAL MEDICINE

## 2018-01-04 RX ORDER — METHOCARBAMOL 750 MG/1
750 TABLET, FILM COATED ORAL 3 TIMES DAILY PRN
Qty: 20 TABLET | Refills: 1 | Status: SHIPPED | OUTPATIENT
Start: 2018-01-04 | End: 2018-05-07

## 2018-01-04 RX ADMIN — INFLUENZA A VIRUS A/MICHIGAN/45/2015 X-275 (H1N1) ANTIGEN (FORMALDEHYDE INACTIVATED), INFLUENZA A VIRUS A/HONG KONG/4801/2014 X-263B (H3N2) ANTIGEN (FORMALDEHYDE INACTIVATED), INFLUENZA B VIRUS B/PHUKET/3073/2013 ANTIGEN (FORMALDEHYDE INACTIVATED), AND INFLUENZA B VIRUS B/BRISBANE/60/2008 ANTIGEN (FORMALDEHYDE INACTIVATED) 0.5 ML: 15; 15; 15; 15 INJECTION, SUSPENSION INTRAMUSCULAR at 14:19

## 2018-01-04 ASSESSMENT — PAIN SCALES - GENERAL: PAINLEVEL: NO PAIN (0)
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
## 2018-01-04 NOTE — MR AVS SNAPSHOT
After Visit Summary   1/4/2018    Michelle Epstein    MRN: [id number]           Patient Information     Date Of Birth          1985        Visit Information        Provider Department      1/4/2018 2:10 PM Kade Luu MD [facility]        Today's Diagnoses     Encounter for immunization    -  1    Hair loss        Fatigue, unspecified type        Adult bronchiectasis (H)        Moderate persistent asthma without complication        Back muscle spasm          Care Instructions    Continue current medication, nebs and vest therapy.   Follow up with gyn about changes in period.  Robaxin for back pain.  Do no drive or work while on this medication.          Follow-ups after your visit        Follow-up notes from your care team     Return in about 6 months (around 7/4/2018).      Your next 10 appointments already scheduled     Jun 07, 2018 10:30 AM CDT   CF LOOP with  PFL CF   [facility] Pulmonary Function Testing ([facility])    [address]  3rd Floor  [facility] [id number] [phone]            Jun 07, 2018 11:10 AM CDT   (Arrive by 10:55 AM)   RETURN CYSTIC FIBROSIS VISIT with Kade Luu MD   [facility] ([facility])    [address] [place] [id number] [phone]              Who to contact     If you have questions or need follow up information about today's clinic visit or your schedule please contact [facility] directly at [phone].  Normal or non-critical lab and imaging results will be communicated to you by MyChart, letter or phone within 4 business days after the clinic has received the results. If you do not hear from us within 7 days, please contact the clinic through MyChart or phone. If you have a critical or abnormal lab result, we will notify you by phone as soon as  possible.  Submit refill requests through [employer] or call your pharmacy and they will forward the refill request to us. Please allow 3 business days for your refill to be completed.          Additional Information About Your Visit        [employer] Information     [employer] gives you secure access to your electronic health record. If you see a primary care provider, you can also send messages to your care team and make appointments. If you have questions, please call your primary care clinic.  If you do not have a primary care provider, please call [phone] and they will assist you.        Care EveryWhere ID     This is your Care EveryWhere ID. This could be used by other organizations to access your [facility] medical records  [id number]        Your Vitals Were     Pulse Respirations Pulse Oximetry BMI (Body Mass Index)          103 16 98% 29.86 kg/m2         Blood Pressure from Last 3 Encounters:   01/04/18 105/76   02/10/17 128/88   01/27/17 98/70    Weight from Last 3 Encounters:   01/04/18 81.4 kg (179 lb 7.3 oz)   02/10/17 79.7 kg (175 lb 11.2 oz)   01/27/17 79.8 kg (176 lb)                 Today's Medication Changes          These changes are accurate as of: 1/4/18 11:59 PM.  If you have any questions, ask your nurse or doctor.               Start taking these medicines.        Dose/Directions    methocarbamol 750 MG tablet   Commonly known as:  ROBAXIN   Used for:  Back muscle spasm   Started by:  Kade Luu MD        Dose:  750 mg   Take 1 tablet (750 mg) by mouth 3 times daily as needed for muscle spasms   Quantity:  20 tablet   Refills:  1         These medicines have changed or have updated prescriptions.        Dose/Directions    fluconazole 150 MG tablet   Commonly known as:  DIFLUCAN   This may have changed:  Another medication with the same name was removed. Continue taking this medication, and follow the directions you see here.   Used for:  Yeast infection of the vagina   Changed by:   Kade Luu MD        TAKE 1 TABLET BY MOUTH TWICE A WEEK WHILE ON ANTIBIOTICS. INSURANCE LIMIT 1 MONTH   Quantity:  4 tablet   Refills:  1         Stop taking these medicines if you haven't already. Please contact your care team if you have questions.     predniSONE 10 MG tablet   Commonly known as:  DELTASONE   Stopped by:  Kade Luu MD                Where to get your medicines      These medications were sent to [id number] - [place], [place] - [address]  [address] [address]     Phone:  [phone]     methocarbamol 750 MG tablet                Primary Care Provider Office Phone # Fax #    Kristen M Kehr, PA-C [phone] [address] [facility] [address]        Equal Access to Services     SETH JOHNSON : Hadii tavo hemphill hadasho Soomaali, waaxda luqadaha, qaybta kaalmada adeegyada, kalina bergman . So [facility] [phone].    ATENCIÓN: Si habla español, tiene a fofana disposición servicios gratuitos de asistencia lingüística. [facility] [phone].    We comply with applicable federal civil rights laws and [place] laws. We do not discriminate on the basis of race, color, national origin, age, disability, sex, sexual orientation, or gender identity.            Thank you!     Thank you for choosing [facility]  for your care. Our goal is always to provide you with excellent care. Hearing back from our patients is one way we can continue to improve our services. Please take a few minutes to complete the written survey that you may receive in the mail after your visit with us. Thank you!             Your Updated Medication List - Protect others around you: Learn how to safely use, store and throw away your medicines at www.disposemymeds.org.          This list is accurate as of: 1/4/18 11:59 PM.  Always use your most recent med list.                   Brand Name Dispense Instructions for  use Diagnosis    * albuterol 108 (90 BASE) MCG/ACT Inhaler    PROAIR HFA    8.5 g    Inhale 2 puffs into the lungs every 6 hours as needed for shortness of breath / dyspnea or wheezing    Bronchiectasis without acute exacerbation (H)       * albuterol (2.5 MG/3ML) 0.083% neb solution     360 mL    TAKE ONE VIAL (2.5MG) BY NEBULIZATION FOUR TIMES A DAY    Bronchiectasis without acute exacerbation (H)       azelastine 0.1 % spray    ASTELIN    30 mL    Spray 1 spray into both nostrils 2 times daily    Bronchiectasis without acute exacerbation (H)       azithromycin 500 MG tablet    ZITHROMAX    14 tablet    Take 1 tablet (500 mg) by mouth Every Mon, Wed, Fri Morning Monday, Wednesday, Friday 1 tab    Adult bronchiectasis (H)       budesonide 1 MG/2ML Susp neb solution    PULMICORT    120 ampule    Nebulized twice daily.    Bronchiectasis without complication (H)       cromolyn 20 MG/2ML neb solution    INTAL    120 mL    Take 2 mLs (20 mg) by nebulization 2 times daily    Moderate persistent asthma without complication, Bronchiectasis without complication (H)       fluconazole 150 MG tablet    DIFLUCAN    4 tablet    TAKE 1 TABLET BY MOUTH TWICE A WEEK WHILE ON ANTIBIOTICS. INSURANCE LIMIT 1 MONTH    Yeast infection of the vagina       fluticasone 50 MCG/ACT spray    FLONASE    16 g    SPRAY 2 SPRAYS INTO BOTH NOSTRIL DAILY    Chronic pansinusitis       methocarbamol 750 MG tablet    ROBAXIN    20 tablet    Take 1 tablet (750 mg) by mouth 3 times daily as needed for muscle spasms    Back muscle spasm       montelukast 10 MG tablet    SINGULAIR    30 tablet    TAKE ONE TABLET BY MOUTH IN THE EVENING    Allergic rhinitis due to animals, Asthma       norethindrone-ethinyl estradiol 1-20 MG-MCG per tablet    JUNEL FE 1/20    84 tablet    Take 1 tablet by mouth daily    Encounter for initial prescription of contraceptive pills       omeprazole 20 MG CR capsule    priLOSEC    60 capsule    Take 1 capsule (20 mg) by mouth 2  times daily    Gastroesophageal reflux disease without esophagitis       sodium chloride 0.65 % nasal spray    [employer] SALINE NASAL SPRAY    30 mL    Spray 1-2 sprays into both nostrils every 2 hours as needed for congestion (irrigation)    S/P functional endoscopic sinus surgery       tobramycin (PF) 300 MG/5ML neb solution    RAJI    280 mL    Take 5 mLs (300 mg) by nebulization 2 times daily 28 days on, 28 days off    Adult bronchiectasis (H), Bronchiectasis with acute exacerbation (H)       * Notice:  This list has 2 medication(s) that are the same as other medications prescribed for you. Read the directions carefully, and ask your doctor or other care provider to review them with you.
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
## 2018-01-04 NOTE — PATIENT INSTRUCTIONS
Continue current medication, nebs and vest therapy.   Follow up with gyn about changes in period.  Robaxin for back pain.  Do no drive or work while on this medication.

## 2018-01-04 NOTE — NURSING NOTE
Chief Complaint   Patient presents with     Bronchiectasis     Patient is being seen for follow up of bronchiectasis        Natalie Wheeler CMA at 2:07 PM on 1/4/2018

## 2018-01-05 DIAGNOSIS — L29.9 EAR ITCH: ICD-10-CM

## 2018-01-08 DIAGNOSIS — J47.9 BRONCHIECTASIS WITHOUT ACUTE EXACERBATION (H): Primary | ICD-10-CM

## 2018-01-08 RX ORDER — FLUOCINOLONE ACETONIDE 0.11 MG/ML
OIL AURICULAR (OTIC)
Qty: 20 ML | Refills: 0 | Status: SHIPPED | OUTPATIENT
Start: 2018-01-08 | End: 2018-03-28

## 2018-01-08 RX ORDER — FERROUS SULFATE 325(65) MG
1 TABLET ORAL
Qty: 100 TABLET | Refills: 3 | Status: SHIPPED | OUTPATIENT
Start: 2018-01-08 | End: 2018-09-06

## 2018-01-15 ENCOUNTER — OFFICE VISIT (OUTPATIENT)
Dept: URGENT CARE | Facility: URGENT CARE | Age: 33
End: 2018-01-15
Payer: COMMERCIAL

## 2018-01-15 VITALS
TEMPERATURE: 98.8 F | WEIGHT: 182 LBS | BODY MASS INDEX: 30.29 KG/M2 | HEART RATE: 104 BPM | DIASTOLIC BLOOD PRESSURE: 74 MMHG | RESPIRATION RATE: 16 BRPM | SYSTOLIC BLOOD PRESSURE: 128 MMHG | OXYGEN SATURATION: 98 %

## 2018-01-15 DIAGNOSIS — J47.1 BRONCHIECTASIS WITH ACUTE EXACERBATION (H): ICD-10-CM

## 2018-01-15 DIAGNOSIS — J47.9 ADULT BRONCHIECTASIS (H): ICD-10-CM

## 2018-01-15 PROCEDURE — 99213 OFFICE O/P EST LOW 20 MIN: CPT | Performed by: PHYSICIAN ASSISTANT

## 2018-01-15 RX ORDER — PREDNISONE 10 MG/1
TABLET ORAL
Qty: 63 TABLET | Refills: 0 | Status: SHIPPED | OUTPATIENT
Start: 2018-01-15 | End: 2018-02-22

## 2018-01-15 NOTE — NURSING NOTE
"Chief Complaint   Patient presents with     Cough     shortness of breath, chest tightness, HX of bronchiectasis       Initial /74  Pulse 104  Temp 98.8  F (37.1  C) (Oral)  Resp 16  Wt 182 lb (82.6 kg)  SpO2 98%  BMI 30.29 kg/m2 Estimated body mass index is 30.29 kg/(m^2) as calculated from the following:    Height as of 2/10/17: 5' 5\" (1.651 m).    Weight as of this encounter: 182 lb (82.6 kg).  Medication Reconciliation: complete   Astrid Perez CMA      "

## 2018-01-15 NOTE — MR AVS SNAPSHOT
After Visit Summary   1/15/2018    Michelle Epstein    MRN: [id number]           Patient Information     Date Of Birth          1985        Visit Information        Provider Department      1/15/2018 5:05 PM Sadie Olmos PA-C [facility]        Today's Diagnoses     Adult bronchiectasis (H)        Bronchiectasis with acute exacerbation (H)           Follow-ups after your visit        Your next 10 appointments already scheduled     Jun 07, 2018 10:30 AM CDT   CF LOOP with  PFL CF   [facility] Pulmonary Function Testing ([facility])    [address]  3rd Floor  [facility] [id number] [phone]            Jun 07, 2018 11:10 AM CDT   (Arrive by 10:55 AM)   RETURN CYSTIC FIBROSIS VISIT with Kade Luu MD   [facility] ([facility])    [address]  [address]  [facility] [id number] [phone]              Who to contact     If you have questions or need follow up information about today's clinic visit or your schedule please contact [facility] directly at [phone].  Normal or non-critical lab and imaging results will be communicated to you by MyChart, letter or phone within 4 business days after the clinic has received the results. If you do not hear from us within 7 days, please contact the clinic through MyChart or phone. If you have a critical or abnormal lab result, we will notify you by phone as soon as possible.  Submit refill requests through [facility] or call your pharmacy and they will forward the refill request to us. Please allow 3 business days for your refill to be completed.          Additional Information About Your Visit        MyChart Information     [facility] gives you secure access to your electronic health record. If you see a primary care provider, you can also send messages to your care team and make appointments. If you  have questions, please call your primary care clinic.  If you do not have a primary care provider, please call [phone] and they will assist you.        Care EveryWhere ID     This is your Care EveryWhere ID. This could be used by other organizations to access your [place] medical records  [id number]        Your Vitals Were     Pulse Temperature Respirations Pulse Oximetry BMI (Body Mass Index)       104 98.8  F (37.1  C) (Oral) 16 98% 30.29 kg/m2        Blood Pressure from Last 3 Encounters:   01/15/18 128/74   01/04/18 105/76   02/10/17 128/88    Weight from Last 3 Encounters:   01/15/18 182 lb (82.6 kg)   01/04/18 179 lb 7.3 oz (81.4 kg)   02/10/17 175 lb 11.2 oz (79.7 kg)              Today, you had the following     No orders found for display         Today's Medication Changes          These changes are accurate as of: 1/15/18 11:59 PM.  If you have any questions, ask your nurse or doctor.               Start taking these medicines.        Dose/Directions    predniSONE 10 MG tablet   Commonly known as:  DELTASONE   Used for:  Adult bronchiectasis (H), Bronchiectasis with acute exacerbation (H)   Started by:  Sadie Olmos PA-C        60mg daily x 3 days, 50mg daily x 3 days, 40mg daily x 3 days, 30mg daily x 3 days, 20mg daily x 3 days, 10mg daily x 3 days, then stop.   Quantity:  63 tablet   Refills:  0            Where to get your medicines      These medications were sent to [place] [address] [place], [place] - [address] [facility]  [address] [facility] [address]     Phone:  [phone]     predniSONE 10 MG tablet                Primary Care Provider Office Phone # Fax #    Kristen M Kehr, PA-C [phone] [address] [facility] [address]        Equal Access to Services     SETH JOHNSON AH: Gustabo aguilera Sopineda, waaxda luqadaha, qaybta kaalmada leidy, kalina fenton. [facility] [phone].    ATENCIÓN: Saskia guallpa  español, tiene a fofana disposición servicios gratuitos de asistencia lingüística. Bob castro [phone].    We comply with applicable federal civil rights laws and [place] laws. We do not discriminate on the basis of race, color, national origin, age, disability, sex, sexual orientation, or gender identity.            Thank you!     Thank you for choosing [facility] [place] [facility]  [facility]. Our goal is always to provide you with excellent care. Hearing back from our patients is one way we can continue to improve our services. Please take a few minutes to complete the written survey that you may receive in the mail after your visit with us. Thank you!             Your Updated Medication List - Protect others around you: Learn how to safely use, store and throw away your medicines at www.disposemymeds.org.          This list is accurate as of: 1/15/18 11:59 PM.  Always use your most recent med list.                   Brand Name Dispense Instructions for use Diagnosis    * albuterol 108 (90 BASE) MCG/ACT Inhaler    PROAIR HFA    8.5 g    Inhale 2 puffs into the lungs every 6 hours as needed for shortness of breath / dyspnea or wheezing    Bronchiectasis without acute exacerbation (H)       * albuterol (2.5 MG/3ML) 0.083% neb solution     360 mL    TAKE ONE VIAL (2.5MG) BY NEBULIZATION FOUR TIMES A DAY    Bronchiectasis without acute exacerbation (H)       azelastine 0.1 % spray    ASTELIN    30 mL    Spray 1 spray into both nostrils 2 times daily    Bronchiectasis without acute exacerbation (H)       azithromycin 500 MG tablet    ZITHROMAX    14 tablet    Take 1 tablet (500 mg) by mouth Every Mon, Wed, Fri Morning Monday, Wednesday, Friday 1 tab    Adult bronchiectasis (H)       budesonide 1 MG/2ML Susp neb solution    PULMICORT    120 ampule    Nebulized twice daily.    Bronchiectasis without complication (H)       cromolyn 20 MG/2ML neb solution    INTAL    120 mL    Take 2 mLs (20 mg) by nebulization 2 times  daily    Moderate persistent asthma without complication, Bronchiectasis without complication (H)       ferrous sulfate 325 (65 FE) MG tablet    IRON    100 tablet    Take 1 tablet (325 mg) by mouth daily (with breakfast)    Bronchiectasis without acute exacerbation (H)       fluconazole 150 MG tablet    DIFLUCAN    4 tablet    TAKE 1 TABLET BY MOUTH TWICE A WEEK WHILE ON ANTIBIOTICS. INSURANCE LIMIT 1 MONTH    Yeast infection of the vagina       fluocinolone acetonide 0.01 % Oil     20 mL    PLACE 5 DROPS IN EAR(S) TWO TIMES A DAY FOR 7 DAYS    Ear itch       fluticasone 50 MCG/ACT spray    FLONASE    16 g    SPRAY 2 SPRAYS INTO BOTH NOSTRIL DAILY    Chronic pansinusitis       methocarbamol 750 MG tablet    ROBAXIN    20 tablet    Take 1 tablet (750 mg) by mouth 3 times daily as needed for muscle spasms    Back muscle spasm       montelukast 10 MG tablet    SINGULAIR    30 tablet    TAKE ONE TABLET BY MOUTH IN THE EVENING    Allergic rhinitis due to animals, Asthma       norethindrone-ethinyl estradiol 1-20 MG-MCG per tablet    JUNEL FE 1/20    84 tablet    Take 1 tablet by mouth daily    Encounter for initial prescription of contraceptive pills       omeprazole 20 MG CR capsule    priLOSEC    60 capsule    Take 1 capsule (20 mg) by mouth 2 times daily    Gastroesophageal reflux disease without esophagitis       predniSONE 10 MG tablet    DELTASONE    63 tablet    60mg daily x 3 days, 50mg daily x 3 days, 40mg daily x 3 days, 30mg daily x 3 days, 20mg daily x 3 days, 10mg daily x 3 days, then stop.    Adult bronchiectasis (H), Bronchiectasis with acute exacerbation (H)       sodium chloride 0.65 % nasal spray    [employer] SALINE NASAL SPRAY    30 mL    Spray 1-2 sprays into both nostrils every 2 hours as needed for congestion (irrigation)    S/P functional endoscopic sinus surgery       tobramycin (PF) 300 MG/5ML neb solution    RAJI    280 mL    Take 5 mLs (300 mg) by nebulization 2 times daily 28 days on, 28 days off     Adult bronchiectasis (H), Bronchiectasis with acute exacerbation (H)       * Notice:  This list has 2 medication(s) that are the same as other medications prescribed for you. Read the directions carefully, and ask your doctor or other care provider to review them with you.
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
## 2018-01-16 ASSESSMENT — ENCOUNTER SYMPTOMS
VOMITING: 0
HEADACHES: 0
CHILLS: 0
SHORTNESS OF BREATH: 1
NAUSEA: 0
COUGH: 1
FEVER: 0
WHEEZING: 1
SPUTUM PRODUCTION: 1
WEIGHT LOSS: 0
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
## 2018-01-23 DIAGNOSIS — J47.0 BRONCHIECTASIS WITH ACUTE LOWER RESPIRATORY INFECTION (H): Primary | ICD-10-CM

## 2018-01-23 RX ORDER — LEVOFLOXACIN 750 MG/1
750 TABLET, FILM COATED ORAL DAILY
Qty: 21 TABLET | Refills: 0 | Status: SHIPPED | OUTPATIENT
Start: 2018-01-23 | End: 2018-02-22

## 2018-02-09 ENCOUNTER — OFFICE VISIT (OUTPATIENT)
Dept: FAMILY MEDICINE | Facility: CLINIC | Age: 33
End: 2018-02-09
Payer: COMMERCIAL

## 2018-02-09 VITALS
TEMPERATURE: 98.7 F | BODY MASS INDEX: 30.49 KG/M2 | OXYGEN SATURATION: 99 % | RESPIRATION RATE: 16 BRPM | WEIGHT: 183 LBS | HEIGHT: 65 IN | SYSTOLIC BLOOD PRESSURE: 116 MMHG | DIASTOLIC BLOOD PRESSURE: 80 MMHG | HEART RATE: 84 BPM

## 2018-02-09 DIAGNOSIS — J47.9 BRONCHIECTASIS WITHOUT COMPLICATION (H): ICD-10-CM

## 2018-02-09 DIAGNOSIS — R53.83 FATIGUE, UNSPECIFIED TYPE: ICD-10-CM

## 2018-02-09 DIAGNOSIS — F32.1 MODERATE SINGLE CURRENT EPISODE OF MAJOR DEPRESSIVE DISORDER (H): Primary | ICD-10-CM

## 2018-02-09 LAB
ASTHMA CONTROL TEST SCORE: 13
ER ASTHMA: 1
HOSPITALIZATION ASTHMA: 0

## 2018-02-09 PROCEDURE — 99214 OFFICE O/P EST MOD 30 MIN: CPT | Performed by: NURSE PRACTITIONER

## 2018-02-09 RX ORDER — BUPROPION HYDROCHLORIDE 75 MG/1
75 TABLET ORAL 2 TIMES DAILY
Qty: 100 TABLET | Refills: 0 | Status: SHIPPED | OUTPATIENT
Start: 2018-02-09 | End: 2018-03-28

## 2018-02-09 NOTE — PATIENT INSTRUCTIONS
Return in 6-8 weeks for follow up.    At [facility], we strive to deliver an exceptional experience to you, every time we see you.  If you receive a survey in the mail, please send us back your thoughts. We really do value your feedback.    Based on your medical history, these are the current health maintenance/preventive care services that you are due for (some may have been done at this visit.)  Health Maintenance Due   Topic Date Due     ASTHMA ACTION PLAN Q1 YR  04/30/1989     ASTHMA CONTROL TEST Q6 MOS  04/30/1989         Suggested websites for health information:  Www.[employer].org : Up to date and easily searchable information on multiple topics.  Www.[employer].gov : medication info, interactive tutorials, watch real surgeries online  Www.familydoctor.org : good info from the Academy of Family Physicians  Www.cdc.gov : public health info, travel advisories, epidemics (H1N1)  Www.aap.org : children's health info, normal development, vaccinations  Www.health.[facility].[place].us : [place] dept of health, public health issues in [place], N1N1    Your care team:     Family Medicine   GEMINI Jennings MD Emily Bunt, APRN CNP   S. MD Randa Reid MD Angela Wermerskirchen, MD         Clinic hours: Monday - Wednesday 7 am-7 pm   Thursdays and Fridays 7 am-5 pm.     [facility]: Monday - Friday 11 am-9 pm,   Saturday and Sunday 9 am-5 pm.    [facility]: Monday -Thursday 8 am-8 pm; Friday 8 am-6 pm; Saturday and Sunday 9 am-5 pm.     [facility]: Monday - Thursday 8 am - 7 pm; Friday 8 am - 6 pm    Clinic: [phone]   [facility]: [phone]   [facility]: [phone]          Patient Education    Bupropion Hydrobromide Oral tablet, extended-release    Bupropion Hydrochloride Oral tablet [Depression/Mood Disorders]    Bupropion Hydrochloride Oral tablet, extended release 12 hour  [Depression/Mood Disorders]    Bupropion Hydrochloride Oral tablet, extended release 12 hour [Smoking Cessation]    Bupropion Hydrochloride Oral tablet, extended release 24 hour [Depression/Mood Disorders]  Bupropion Hydrochloride Oral tablet [Depression/Mood Disorders]  What is this medicine?  BUPROPION (byoo PROE pee on) is used to treat depression.  This medicine may be used for other purposes; ask your health care provider or pharmacist if you have questions.  What should I tell my health care provider before I take this medicine?  They need to know if you have any of these conditions:    an eating disorder, such as anorexia or bulimia    bipolar disorder or psychosis    diabetes or high blood sugar, treated with medication    glaucoma    heart disease, previous heart attack, or irregular heart beat    head injury or brain tumor    high blood pressure    kidney or liver disease    seizures    suicidal thoughts or a previous suicide attempt    Tourette's syndrome    weight loss    an unusual or allergic reaction to bupropion, other medicines, foods, dyes, or preservatives    breast-feeding    pregnant or trying to become pregnant  How should I use this medicine?  Take this medicine by mouth with a glass of water. Follow the directions on the prescription label. You can take it with or without food. If it upsets your stomach, take it with food. Take your medicine at regular intervals. Do not take your medicine more often than directed. Do not stop taking this medicine suddenly except upon the advice of your doctor. Stopping this medicine too quickly may cause serious side effects or your condition may worsen.  A special MedGuide will be given to you by the pharmacist with each prescription and refill. Be sure to read this information carefully each time.  Talk to your pediatrician regarding the use of this medicine in children. Special care may be needed.  Overdosage: If you think you have taken too much of this  medicine contact a poison control center or emergency room at once.  NOTE: This medicine is only for you. Do not share this medicine with others.  What if I miss a dose?  If you miss a dose, take it as soon as you can. If it is less than four hours to your next dose, take only that dose and skip the missed dose. Do not take double or extra doses.  What may interact with this medicine?  Do not take this medicine with any of the following medications:    linezolid    MAOIs like Azilect, Carbex, Eldepryl, Marplan, Nardil, and Parnate    methylene blue (injected into a vein)    other medicines that contain bupropion like Zyban  This medicine may also interact with the following medications:    alcohol    certain medicines for anxiety or sleep    certain medicines for blood pressure like metoprolol, propranolol    certain medicines for depression or psychotic disturbances    certain medicines for HIV or AIDS like efavirenz, lopinavir, nelfinavir, ritonavir    certain medicines for irregular heart beat like propafenone, flecainide    certain medicines for Parkinson's disease like amantadine, levodopa    certain medicines for seizures like carbamazepine, phenytoin, phenobarbital    cimetidine    clopidogrel    cyclophosphamide    furazolidone    isoniazid    nicotine    orphenadrine    procarbazine    steroid medicines like prednisone or cortisone    stimulant medicines for attention disorders, weight loss, or to stay awake    tamoxifen    theophylline    thiotepa    ticlopidine    tramadol    warfarin  This list may not describe all possible interactions. Give your health care provider a list of all the medicines, herbs, non-prescription drugs, or dietary supplements you use. Also tell them if you smoke, drink alcohol, or use illegal drugs. Some items may interact with your medicine.  What should I watch for while using this medicine?  Tell your doctor if your symptoms do not get better or if they get worse. Visit your  doctor or health care professional for regular checks on your progress. Because it may take several weeks to see the full effects of this medicine, it is important to continue your treatment as prescribed by your doctor.  Patients and their families should watch out for new or worsening thoughts of suicide or depression. Also watch out for sudden changes in feelings such as feeling anxious, agitated, panicky, irritable, hostile, aggressive, impulsive, severely restless, overly excited and hyperactive, or not being able to sleep. If this happens, especially at the beginning of treatment or after a change in dose, call your health care professional.  Avoid alcoholic drinks while taking this medicine. Drinking excessive alcoholic beverages, using sleeping or anxiety medicines, or quickly stopping the use of these agents while taking this medicine may increase your risk for a seizure.  Do not drive or use heavy machinery until you know how this medicine affects you. This medicine can impair your ability to perform these tasks.  Do not take this medicine close to bedtime. It may prevent you from sleeping.  Your mouth may get dry. Chewing sugarless gum or sucking hard candy, and drinking plenty of water may help. Contact your doctor if the problem does not go away or is severe.  What side effects may I notice from receiving this medicine?  Side effects that you should report to your doctor or health care professional as soon as possible:    allergic reactions like skin rash, itching or hives, swelling of the face, lips, or tongue    breathing problems    changes in vision    confusion    fast or irregular heartbeat    hallucinations    increased blood pressure    redness, blistering, peeling or loosening of the skin, including inside the mouth    seizures    suicidal thoughts or other mood changes    unusually weak or tired    vomiting  Side effects that usually do not require medical attention (report to your doctor or  health care professional if they continue or are bothersome):    change in sex drive or performance    constipation    headache    loss of appetite    nausea    tremors    weight loss  This list may not describe all possible side effects. Call your doctor for medical advice about side effects. You may report side effects to FDA at 5-637-MML-3777.  Where should I keep my medicine?  Keep out of the reach of children.  Store at room temperature between 15 and 25 degrees C (59 and 77 degrees F), away from direct sunlight and moisture. Keep tightly closed. Throw away any unused medicine after the expiration date.  NOTE:This sheet is a summary. It may not cover all possible information. If you have questions about this medicine, talk to your doctor, pharmacist, or health care provider. Copyright  2016 [employer]

## 2018-02-09 NOTE — PROGRESS NOTES
SUBJECTIVE:   Michelle Epstein is a 32 year old female who presents to clinic today for the following health issues:        Abnormal Mood Symptoms  Onset: 1 month     Description:   Depression: YES  Anxiety: no     Accompanying Signs & Symptoms:  Still participating in activities that you used to enjoy: YES  Fatigue: YES  Irritability: YES  Difficulty concentrating: YES  Changes in appetite: YES, over eating   Problems with sleep: YES  Heart racing/beating fast : no   Thoughts of hurting yourself or others: none    History:   Recent stress: no   Prior depression hospitalization: None  Family history of depression: YES-mother, aunt, cousins-some medication helps, some have gone off meds due to not helping.   Family history of anxiety: YES    Precipitating factors:   Alcohol/drug use: alcohol yes      Alleviating factors:  Nothing     Therapies Tried and outcome: None   Had post-partum depression, was on antidepressants. 15 yr ago. This feels similar to that. Was on wellbutrin and feels that may have helped. She is crying/tearful. Her daughter is present. She is unsure where these feelings are coming from. She is not interested in therapy at this time.     Fatigue: started about Nov/Dec 2017. Had fatigue for a while. Had labs done in Jan 2018. Mostly normal other than low iron. Is taking iron daily. Bedtime comes and wide awake. Does not nap. Drinks coffee-2 cups a day, for a while drank 4 cups a day. Takes a while to fall asleep, but will sleep through the night most nights. When having a breathing flare will wake at night to do breathing treatment.     Follow with pulmonology for her bronchiectasis. They are not sure if she truly has asthma or not, so she is on inhalers for now. Is on chronic antibiotics for her bronchiectasis and alternates between levaquin and azithromax.         Problem list and histories reviewed & adjusted, as indicated.  Additional history: as documented    Patient Active Problem List    Diagnosis     CARDIOVASCULAR SCREENING; LDL GOAL LESS THAN 160     Adult bronchiectasis (H)     Dry eye syndrome     Asthma     Thyroid nodule     Bronchiectasis (H)     Encounter for initial prescription of contraceptive pills     Moderate single current episode of major depressive disorder (H)     Past Surgical History:   Procedure Laterality Date     ADENOIDECTOMY  1997     ENDOSCOPY       HC COLP CERVIX/UPPER VAGINA W BX CERVIX  2007    neg     NASAL/SINUS POLYPECTOMY  2015     OPTICAL TRACKING SYSTEM ENDOSCOPIC SINUS SURGERY Bilateral 1/11/2016    Procedure: OPTICAL TRACKING SYSTEM ENDOSCOPIC SINUS SURGERY;  Surgeon: Asmita Dallas MD;  Location: [facility] OR     REIMPLANT URETER CHILD  1989    bilateral     SINUS SURGERY  2009    x 2     THYROID BIOPSY  2012    neg     TONSILLECTOMY       TONSILLECTOMY  1997       Social History   Substance Use Topics     Smoking status: Never Smoker     Smokeless tobacco: Never Used      Comment: lives in nonsmoking household      Alcohol use 0.0 oz/week     0 Standard drinks or equivalent per week      Comment: rare, not in PG     Family History   Problem Relation Age of Onset     Neurologic Disorder Mother      brain tumor     HEART DISEASE Mother      SVT     Depression Mother      Migraines Mother      Alcohol/Drug Father      Cardiovascular Maternal Grandmother      Arthritis Maternal Grandmother      DIABETES Maternal Grandmother      HEART DISEASE Maternal Grandmother      AAA     Hypertension Maternal Grandmother      CEREBROVASCULAR DISEASE Maternal Grandmother      Asthma Maternal Grandmother      Unknown/Adopted Paternal Grandmother      Unknown/Adopted Paternal Grandfather      Genitourinary Problems Daughter      kidney reflux     Hypertension Maternal Grandfather      Dementia Maternal Grandfather      Neurologic Disorder Son 6     Seizures     Glaucoma No family hx of      Macular Degeneration No family hx of      CANCER No family hx of      Thyroid Disease No  "family hx of      ,           Reviewed and updated as needed this visit by clinical staff  Tobacco  Allergies  Meds  Problems  Med Hx  Surg Hx  Fam Hx  Soc Hx        Reviewed and updated as needed this visit by Provider  Allergies  Meds  Problems         ROS:  Constitutional, cardiovascular, pulmonary are negative, except as otherwise noted. Psych + for depression.    OBJECTIVE:     /80 (BP Location: Right arm, Patient Position: Sitting, Cuff Size: Adult Regular)  Pulse 84  Temp 98.7  F (37.1  C) (Oral)  Resp 16  Ht 1.645 m (5' 4.75\")  Wt 83 kg (183 lb)  LMP 02/06/2018  SpO2 99%  BMI 30.69 kg/m2  Body mass index is 30.69 kg/(m^2).  GENERAL: healthy, alert and no acute distress  RESP: lungs clear to auscultation - with scattered wheezes throughout  CV: regular rate and rhythm, normal S1 S2, no S3 or S4, no murmur, click or rub  ABDOMEN: soft, nontender active BS  PSYCH: mentation appears normal, affect sad, tearful, depressed    Diagnostic Test Results:  none     ASSESSMENT/PLAN:         1. Moderate single current episode of major depressive disorder (H)  Denies thoughts of suicide. Not interested in therapy at this time. Will return after vacation end of March to follow up on how medication is going. Is aware of side effects, will call or return sooner if develops side effects or have worsening of depression. Okay to do phone visit for follow up if do not have time to come in.   - buPROPion (WELLBUTRIN) 75 MG tablet; Take 1 tablet (75 mg) by mouth 2 times daily  Dispense: 100 tablet; Refill: 0    2. Fatigue, unspecified type  May be associated with above. Had labs checked by pulmonology 1 month ago,slighlty anemic. On iron daily. Monitor     3. Bronchiectasis without complication (H)  Follows with pulmonology. On chronic antibiotics see MAR.      See Patient Instructions-return in 6-8 weeks to Discussed in clinic visit depression    MIKE Chatterjee, NP-C  [facility]  "

## 2018-02-09 NOTE — MR AVS SNAPSHOT
After Visit Summary   2/9/2018    Michelle Epstein    MRN: [id number]           Patient Information     Date Of Birth          1985        Visit Information        Provider Department      2/9/2018 10:40 AM Mindy Fink NP [facility]        Today's Diagnoses     Moderate single current episode of major depressive disorder (H)    -  1      Care Instructions    Return in 6-8 weeks for follow up.    At [facility], we strive to deliver an exceptional experience to you, every time we see you.  If you receive a survey in the mail, please send us back your thoughts. We really do value your feedback.    Based on your medical history, these are the current health maintenance/preventive care services that you are due for (some may have been done at this visit.)  Health Maintenance Due   Topic Date Due     ASTHMA ACTION PLAN Q1 YR  04/30/1989     ASTHMA CONTROL TEST Q6 MOS  04/30/1989         Suggested websites for health information:  Www.Quoteroller.Livestream : Up to date and easily searchable information on multiple topics.  Www.medlineplus.gov : medication info, interactive tutorials, watch real surgeries online  Www.familydoctor.org : good info from the Academy of Family Physicians  Www.cdc.gov : public health info, travel advisories, epidemics (H1N1)  Www.aap.org : children's health info, normal development, vaccinations  Www.health.state.[place].us : [place] dept of health, public health issues in [place], N1N1    Your care team:     Family Medicine   GEMINI Jennings MD Emily Bunt, APRN CNP   S. MD Randa Reid MD Angela Wermerskirchen, MD         Clinic hours: Monday - Wednesday 7 am-7 pm   Thursdays and Fridays 7 am-5 pm.     [facility]: Monday - Friday 11 am-9 pm,   Saturday and Sunday 9 am-5 pm.    [facility]: Monday -Thursday 8 am-8 pm; Friday 8 am-6 pm; Saturday and Sunday 9 am-5 pm.     [facility]: Monday - Thursday 8 am - 7 pm; Friday 8 am - 6 pm    Clinic: [phone]   [facility]: [phone]   [facility]: [phone]          Patient Education    Bupropion Hydrobromide Oral tablet, extended-release    Bupropion Hydrochloride Oral tablet [Depression/Mood Disorders]    Bupropion Hydrochloride Oral tablet, extended release 12 hour [Depression/Mood Disorders]    Bupropion Hydrochloride Oral tablet, extended release 12 hour [Smoking Cessation]    Bupropion Hydrochloride Oral tablet, extended release 24 hour [Depression/Mood Disorders]  Bupropion Hydrochloride Oral tablet [Depression/Mood Disorders]  What is this medicine?  BUPROPION (byoo PROE pee on) is used to treat depression.  This medicine may be used for other purposes; ask your health care provider or pharmacist if you have questions.  What should I tell my health care provider before I take this medicine?  They need to know if you have any of these conditions:    an eating disorder, such as anorexia or bulimia    bipolar disorder or psychosis    diabetes or high blood sugar, treated with medication    glaucoma    heart disease, previous heart attack, or irregular heart beat    head injury or brain tumor    high blood pressure    kidney or liver disease    seizures    suicidal thoughts or a previous suicide attempt    Tourette's syndrome    weight loss    an unusual or allergic reaction to bupropion, other medicines, foods, dyes, or preservatives    breast-feeding    pregnant or trying to become pregnant  How should I use this medicine?  Take this medicine by mouth with a glass of water. Follow the directions on the prescription label. You can take it with or without food. If it upsets your stomach, take it with food. Take your medicine at regular intervals. Do not take your medicine more often than directed. Do not stop taking this medicine suddenly except upon the advice of your doctor.  Stopping this medicine too quickly may cause serious side effects or your condition may worsen.  A special MedGuide will be given to you by the pharmacist with each prescription and refill. Be sure to read this information carefully each time.  Talk to your pediatrician regarding the use of this medicine in children. Special care may be needed.  Overdosage: If you think you have taken too much of this medicine contact a poison control center or emergency room at once.  NOTE: This medicine is only for you. Do not share this medicine with others.  What if I miss a dose?  If you miss a dose, take it as soon as you can. If it is less than four hours to your next dose, take only that dose and skip the missed dose. Do not take double or extra doses.  What may interact with this medicine?  Do not take this medicine with any of the following medications:    linezolid    MAOIs like Azilect, Carbex, Eldepryl, Marplan, Nardil, and Parnate    methylene blue (injected into a vein)    other medicines that contain bupropion like Zyban  This medicine may also interact with the following medications:    alcohol    certain medicines for anxiety or sleep    certain medicines for blood pressure like metoprolol, propranolol    certain medicines for depression or psychotic disturbances    certain medicines for HIV or AIDS like efavirenz, lopinavir, nelfinavir, ritonavir    certain medicines for irregular heart beat like propafenone, flecainide    certain medicines for Parkinson's disease like amantadine, levodopa    certain medicines for seizures like carbamazepine, phenytoin, phenobarbital    cimetidine    clopidogrel    cyclophosphamide    furazolidone    isoniazid    nicotine    orphenadrine    procarbazine    steroid medicines like prednisone or cortisone    stimulant medicines for attention disorders, weight loss, or to stay awake    tamoxifen    theophylline    thiotepa    ticlopidine    tramadol    warfarin  This list may not  describe all possible interactions. Give your health care provider a list of all the medicines, herbs, non-prescription drugs, or dietary supplements you use. Also tell them if you smoke, drink alcohol, or use illegal drugs. Some items may interact with your medicine.  What should I watch for while using this medicine?  Tell your doctor if your symptoms do not get better or if they get worse. Visit your doctor or health care professional for regular checks on your progress. Because it may take several weeks to see the full effects of this medicine, it is important to continue your treatment as prescribed by your doctor.  Patients and their families should watch out for new or worsening thoughts of suicide or depression. Also watch out for sudden changes in feelings such as feeling anxious, agitated, panicky, irritable, hostile, aggressive, impulsive, severely restless, overly excited and hyperactive, or not being able to sleep. If this happens, especially at the beginning of treatment or after a change in dose, call your health care professional.  Avoid alcoholic drinks while taking this medicine. Drinking excessive alcoholic beverages, using sleeping or anxiety medicines, or quickly stopping the use of these agents while taking this medicine may increase your risk for a seizure.  Do not drive or use heavy machinery until you know how this medicine affects you. This medicine can impair your ability to perform these tasks.  Do not take this medicine close to bedtime. It may prevent you from sleeping.  Your mouth may get dry. Chewing sugarless gum or sucking hard candy, and drinking plenty of water may help. Contact your doctor if the problem does not go away or is severe.  What side effects may I notice from receiving this medicine?  Side effects that you should report to your doctor or health care professional as soon as possible:    allergic reactions like skin rash, itching or hives, swelling of the face, lips, or  tongue    breathing problems    changes in vision    confusion    fast or irregular heartbeat    hallucinations    increased blood pressure    redness, blistering, peeling or loosening of the skin, including inside the mouth    seizures    suicidal thoughts or other mood changes    unusually weak or tired    vomiting  Side effects that usually do not require medical attention (report to your doctor or health care professional if they continue or are bothersome):    change in sex drive or performance    constipation    headache    loss of appetite    nausea    tremors    weight loss  This list may not describe all possible side effects. Call your doctor for medical advice about side effects. You may report side effects to FDA at 3-382-MJR-4876.  Where should I keep my medicine?  Keep out of the reach of children.  Store at room temperature between 15 and 25 degrees C (59 and 77 degrees F), away from direct sunlight and moisture. Keep tightly closed. Throw away any unused medicine after the expiration date.  NOTE:This sheet is a summary. It may not cover all possible information. If you have questions about this medicine, talk to your doctor, pharmacist, or health care provider. Copyright  2016 Gold Standard                Follow-ups after your visit        Your next 10 appointments already scheduled     Jun 07, 2018 10:30 AM CDT   CF LOOP with  PFL CF   [facility] ([facility])    [address]  3rd Floor  [facility] [address] [phone]            Jun 07, 2018 11:10 AM CDT   (Arrive by 10:55 AM)   RETURN CYSTIC FIBROSIS VISIT with Kade Luu MD   [facility] ([facility])    [address] [place] [address]  [address] [place] [address] [phone]              Who to contact     If you have questions or need follow up information about today's clinic visit or your schedule please  "contact [facility] directly at [phone].  Normal or non-critical lab and imaging results will be communicated to you by MyChart, letter or phone within 4 business days after the clinic has received the results. If you do not hear from us within 7 days, please contact the clinic through [employer] or phone. If you have a critical or abnormal lab result, we will notify you by phone as soon as possible.  Submit refill requests through AddThis or call your pharmacy and they will forward the refill request to us. Please allow 3 business days for your refill to be completed.          Additional Information About Your Visit        [employer] Information     AddThis gives you secure access to your electronic health record. If you see a primary care provider, you can also send messages to your care team and make appointments. If you have questions, please call your primary care clinic.  If you do not have a primary care provider, please call [phone] and they will assist you.        Care EveryWhere ID     This is your Care EveryWhere ID. This could be used by other organizations to access your [place] medical records  [id number]        Your Vitals Were     Pulse Temperature Respirations Height Last Period Pulse Oximetry    84 98.7  F (37.1  C) (Oral) 16 1.645 m (5' 4.75\") 02/06/2018 99%    BMI (Body Mass Index)                   30.69 kg/m2            Blood Pressure from Last 3 Encounters:   02/09/18 116/80   01/15/18 128/74   01/04/18 105/76    Weight from Last 3 Encounters:   02/09/18 83 kg (183 lb)   01/15/18 82.6 kg (182 lb)   01/04/18 81.4 kg (179 lb 7.3 oz)              Today, you had the following     No orders found for display         Today's Medication Changes          These changes are accurate as of 2/9/18 11:07 AM.  If you have any questions, ask your nurse or doctor.               Start taking these medicines.        Dose/Directions    buPROPion 75 MG tablet   Commonly known as:  WELLBUTRIN "   Used for:  Moderate single current episode of major depressive disorder (H)   Started by:  Mindy Fink NP        Dose:  75 mg   Take 1 tablet (75 mg) by mouth 2 times daily   Quantity:  100 tablet   Refills:  0            Where to get your medicines      These medications were sent to [id number] - [place], [place] - [address]  [address] [place] [address]     Phone:  [phone]     buPROPion 75 MG tablet                Primary Care Provider Office Phone # Fax #    Kristen M Kehr, PA-C [phone] [address] [facility] [address]        Equal Access to Services     [facility]: Hadii tavo hemphill hadasho Soomaali, waaxda luqadaha, qaybta kaalmada adeegyada, kalina bergman . So [facility] [phone].    ATENCIÓN: Si habla español, tiene a fofana disposición servicios gratuitos de asistencia lingüística. [facility] [phone].    We comply with applicable federal civil rights laws and [place] laws. We do not discriminate on the basis of race, color, national origin, age, disability, sex, sexual orientation, or gender identity.            Thank you!     Thank you for choosing [facility]  for your care. Our goal is always to provide you with excellent care. Hearing back from our patients is one way we can continue to improve our services. Please take a few minutes to complete the written survey that you may receive in the mail after your visit with us. Thank you!             Your Updated Medication List - Protect others around you: Learn how to safely use, store and throw away your medicines at www.disposemymeds.org.          This list is accurate as of 2/9/18 11:07 AM.  Always use your most recent med list.                   Brand Name Dispense Instructions for use Diagnosis    * albuterol 108 (90 BASE) MCG/ACT Inhaler    PROAIR HFA    8.5 g    Inhale 2 puffs into the lungs every 6 hours as needed for shortness of breath / dyspnea or  wheezing    Bronchiectasis without acute exacerbation (H)       * albuterol (2.5 MG/3ML) 0.083% neb solution     360 mL    TAKE ONE VIAL (2.5MG) BY NEBULIZATION FOUR TIMES A DAY    Bronchiectasis without acute exacerbation (H)       azelastine 0.1 % spray    ASTELIN    30 mL    Spray 1 spray into both nostrils 2 times daily    Bronchiectasis without acute exacerbation (H)       azithromycin 500 MG tablet    ZITHROMAX    14 tablet    Take 1 tablet (500 mg) by mouth Every Mon, Wed, Fri Morning Monday, Wednesday, Friday 1 tab    Adult bronchiectasis (H)       budesonide 1 MG/2ML Susp neb solution    PULMICORT    120 ampule    Nebulized twice daily.    Bronchiectasis without complication (H)       buPROPion 75 MG tablet    WELLBUTRIN    100 tablet    Take 1 tablet (75 mg) by mouth 2 times daily    Moderate single current episode of major depressive disorder (H)       cromolyn 20 MG/2ML neb solution    INTAL    120 mL    Take 2 mLs (20 mg) by nebulization 2 times daily    Moderate persistent asthma without complication, Bronchiectasis without complication (H)       ferrous sulfate 325 (65 FE) MG tablet    IRON    100 tablet    Take 1 tablet (325 mg) by mouth daily (with breakfast)    Bronchiectasis without acute exacerbation (H)       fluconazole 150 MG tablet    DIFLUCAN    4 tablet    TAKE 1 TABLET BY MOUTH TWICE A WEEK WHILE ON ANTIBIOTICS. INSURANCE LIMIT 1 MONTH    Yeast infection of the vagina       fluocinolone acetonide 0.01 % Oil     20 mL    PLACE 5 DROPS IN EAR(S) TWO TIMES A DAY FOR 7 DAYS    Ear itch       fluticasone 50 MCG/ACT spray    FLONASE    16 g    SPRAY 2 SPRAYS INTO BOTH NOSTRIL DAILY    Chronic pansinusitis       levofloxacin 750 MG tablet    LEVAQUIN    21 tablet    Take 1 tablet (750 mg) by mouth daily Stop azithromycin while on Levaquin. You may resume azithromycin once the Levaquin course is complete.    Bronchiectasis with acute lower respiratory infection (H)       methocarbamol 750 MG  tablet    ROBAXIN    20 tablet    Take 1 tablet (750 mg) by mouth 3 times daily as needed for muscle spasms    Back muscle spasm       montelukast 10 MG tablet    SINGULAIR    30 tablet    TAKE ONE TABLET BY MOUTH IN THE EVENING    Allergic rhinitis due to animals, Asthma       norethindrone-ethinyl estradiol 1-20 MG-MCG per tablet    JUNEL FE 1/20    84 tablet    Take 1 tablet by mouth daily    Encounter for initial prescription of contraceptive pills       omeprazole 20 MG CR capsule    priLOSEC    60 capsule    Take 1 capsule (20 mg) by mouth 2 times daily    Gastroesophageal reflux disease without esophagitis       predniSONE 10 MG tablet    DELTASONE    63 tablet    60mg daily x 3 days, 50mg daily x 3 days, 40mg daily x 3 days, 30mg daily x 3 days, 20mg daily x 3 days, 10mg daily x 3 days, then stop.    Adult bronchiectasis (H), Bronchiectasis with acute exacerbation (H)       sodium chloride 0.65 % nasal spray    CVS SALINE NASAL SPRAY    30 mL    Spray 1-2 sprays into both nostrils every 2 hours as needed for congestion (irrigation)    S/P functional endoscopic sinus surgery       tobramycin (PF) 300 MG/5ML neb solution    RAJI    280 mL    Take 5 mLs (300 mg) by nebulization 2 times daily 28 days on, 28 days off    Adult bronchiectasis (H), Bronchiectasis with acute exacerbation (H)       * Notice:  This list has 2 medication(s) that are the same as other medications prescribed for you. Read the directions carefully, and ask your doctor or other care provider to review them with you.

## 2018-02-09 NOTE — NURSING NOTE
"Chief Complaint   Patient presents with     Depression       Initial Ht 1.645 m (5' 4.75\")  Wt 83 kg (183 lb)  LMP 02/06/2018  BMI 30.69 kg/m2 Estimated body mass index is 30.69 kg/(m^2) as calculated from the following:    Height as of this encounter: 1.645 m (5' 4.75\").    Weight as of this encounter: 83 kg (183 lb).  Medication Reconciliation: jacob Benedict, Medical Assistant      "

## 2018-02-21 DIAGNOSIS — Z30.011 ENCOUNTER FOR INITIAL PRESCRIPTION OF CONTRACEPTIVE PILLS: ICD-10-CM

## 2018-02-21 NOTE — TELEPHONE ENCOUNTER
Note from pharmacy stating patient says she is taking these continuously and needs a new Rx with directions.

## 2018-02-22 DIAGNOSIS — J47.0 BRONCHIECTASIS WITH ACUTE LOWER RESPIRATORY INFECTION (H): ICD-10-CM

## 2018-02-22 DIAGNOSIS — J47.1 BRONCHIECTASIS WITH ACUTE EXACERBATION (H): ICD-10-CM

## 2018-02-22 DIAGNOSIS — J47.9 ADULT BRONCHIECTASIS (H): ICD-10-CM

## 2018-02-22 RX ORDER — SCOLOPAMINE TRANSDERMAL SYSTEM 1 MG/1
1 PATCH, EXTENDED RELEASE TRANSDERMAL
Qty: 3 PATCH | Refills: 0 | Status: SHIPPED | OUTPATIENT
Start: 2018-02-22 | End: 2018-03-28

## 2018-02-22 RX ORDER — LEVOFLOXACIN 750 MG/1
750 TABLET, FILM COATED ORAL DAILY
Qty: 21 TABLET | Refills: 0 | Status: SHIPPED | OUTPATIENT
Start: 2018-02-22 | End: 2018-03-28

## 2018-02-22 RX ORDER — NORETHINDRONE ACETATE AND ETHINYL ESTRADIOL 1MG-20(21)
1 KIT ORAL DAILY
Qty: 84 TABLET | Refills: 0 | Status: SHIPPED | OUTPATIENT
Start: 2018-02-22 | End: 2018-03-28

## 2018-02-22 RX ORDER — PREDNISONE 10 MG/1
TABLET ORAL
Qty: 63 TABLET | Refills: 0 | Status: SHIPPED | OUTPATIENT
Start: 2018-02-22 | End: 2018-03-28

## 2018-02-22 NOTE — TELEPHONE ENCOUNTER
Please let patient know this prescription has been refilled however she needs an office visit as she hasn't been seen in over a year.  Thanks.    Myranda Glass

## 2018-02-23 ENCOUNTER — TELEPHONE (OUTPATIENT)
Dept: PULMONOLOGY | Facility: CLINIC | Age: 33
End: 2018-02-23

## 2018-02-23 NOTE — TELEPHONE ENCOUNTER
PA Initiation    Medication: scopolamine patches (pending)  Insurance Company: [employer] - Phone [phone] Fax [phone]  Pharmacy Filling the Rx: [id number] - [place]  [address]  Filling Pharmacy Phone:    Filling Pharmacy Fax:    Start Date: 2/23/2018

## 2018-02-26 ENCOUNTER — MYC MEDICAL ADVICE (OUTPATIENT)
Dept: FAMILY MEDICINE | Facility: CLINIC | Age: 33
End: 2018-02-26

## 2018-02-26 ENCOUNTER — E-VISIT (OUTPATIENT)
Dept: OBGYN | Facility: OTHER | Age: 33
End: 2018-02-26
Payer: COMMERCIAL

## 2018-02-26 ENCOUNTER — TELEPHONE (OUTPATIENT)
Dept: PULMONOLOGY | Facility: CLINIC | Age: 33
End: 2018-02-26

## 2018-02-26 DIAGNOSIS — J32.9 SINUS INFECTION: ICD-10-CM

## 2018-02-26 DIAGNOSIS — N76.0 VAGINITIS AND VULVOVAGINITIS: Primary | ICD-10-CM

## 2018-02-26 DIAGNOSIS — J32.9 SINUSITIS, CHRONIC: ICD-10-CM

## 2018-02-26 DIAGNOSIS — N89.8 VAGINAL ITCHING: Primary | ICD-10-CM

## 2018-02-26 RX ORDER — AMOXICILLIN AND CLAVULANATE POTASSIUM 500; 125 MG/1; MG/1
1 TABLET, FILM COATED ORAL 2 TIMES DAILY
Qty: 28 TABLET | Refills: 0 | Status: SHIPPED | OUTPATIENT
Start: 2018-02-26 | End: 2018-03-28

## 2018-02-26 RX ORDER — METHYLPREDNISOLONE 4 MG
TABLET, DOSE PACK ORAL
Qty: 21 TABLET | Refills: 0 | Status: SHIPPED | OUTPATIENT
Start: 2018-02-26 | End: 2018-03-28

## 2018-02-26 NOTE — TELEPHONE ENCOUNTER
"Call from Michelle:  1. Received Wellbutrin from PCP. Is it okay to take Levaquin with it, should she need Levaquin while on vacation.  2. \"Everyone sick in household.\" Daughter with pneumonia and Michelle \"coming down with something\". Requesting different antibiotic and Medrol Dose Cristian prior to leaving on vacation.  3. Vaginal bacterial infection - Diflucan not \"working\". Requesting 21 day course of Flagyl.    When questioned about symptoms she is feeling, reports shortness of breath with activity; wheezing with activity that is relieved with nebs. No fevers.    PLAN:  Discussed with Dr Luu:  May use Augmentin 875 mg BID x14 days and Medrol Dose cristian.  To call her GYN to discuss her vaginal bacterial infection.  Ok to use Wellbutrin and Levaquin together.    Voicemail left for Michelle with above plan.  "

## 2018-02-26 NOTE — MR AVS SNAPSHOT
After Visit Summary   2/26/2018    Michelle Epstein    MRN: [id number]           Patient Information     Date Of Birth          1985        Visit Information        Provider Department      2/26/2018 4:43 PM Myranda Glass DO [facility]        Today's Diagnoses     Vaginitis and vulvovaginitis    -  1       Follow-ups after your visit        Your next 10 appointments already scheduled     Jun 07, 2018 10:30 AM CDT   CF LOOP with [facility] CF   [facility] ([facility])    [address]  3rd Floor  [place] [id number] [phone]            Jun 07, 2018 11:10 AM CDT   (Arrive by 10:55 AM)   RETURN CYSTIC FIBROSIS VISIT with Kade Luu MD   [facility] ([facility])    [address]  [address]  [place] [id number] [phone]              Who to contact     If you have questions or need follow up information about today's clinic visit or your schedule please contact [facility] directly at [phone].  Normal or non-critical lab and imaging results will be communicated to you by MyChart, letter or phone within 4 business days after the clinic has received the results. If you do not hear from us within 7 days, please contact the clinic through MyChart or phone. If you have a critical or abnormal lab result, we will notify you by phone as soon as possible.  Submit refill requests through [employer] or call your pharmacy and they will forward the refill request to us. Please allow 3 business days for your refill to be completed.          Additional Information About Your Visit        MyChart Information     [employer] gives you secure access to your electronic health record. If you see a primary care provider, you can also send messages to your care team and make appointments. If you have questions, please call your primary  care clinic.  If you do not have a primary care provider, please call [phone] and they will assist you.        Care EveryWhere ID     This is your Care EveryWhere ID. This could be used by other organizations to access your [place] medical records  [id number]        Your Vitals Were     Last Period                   02/06/2018            Blood Pressure from Last 3 Encounters:   02/09/18 116/80   01/15/18 128/74   01/04/18 105/76    Weight from Last 3 Encounters:   02/09/18 183 lb (83 kg)   01/15/18 182 lb (82.6 kg)   01/04/18 179 lb 7.3 oz (81.4 kg)              Today, you had the following     No orders found for display         Today's Medication Changes          These changes are accurate as of 2/26/18 11:59 PM.  If you have any questions, ask your nurse or doctor.               Start taking these medicines.        Dose/Directions    amoxicillin-clavulanate 500-125 MG per tablet   Commonly known as:  AUGMENTIN   Used for:  Sinus infection   Started by:  Cyndy Oneal RN        Dose:  1 tablet   Take 1 tablet by mouth 2 times daily   Quantity:  28 tablet   Refills:  0       methylPREDNISolone 4 MG tablet   Commonly known as:  MEDROL DOSEPAK   Used for:  Sinusitis, chronic   Started by:  Cyndy Oneal RN        Take as directed per patient instruction card   Quantity:  21 tablet   Refills:  0       metroNIDAZOLE 500 MG tablet   Commonly known as:  FLAGYL   Used for:  Vaginitis and vulvovaginitis   Started by:  Myranda Glass DO        Dose:  500 mg   Take 1 tablet (500 mg) by mouth 2 times daily   Quantity:  14 tablet   Refills:  0            Where to get your medicines      These medications were sent to [id number] - [place] - [address]  [address] [address]     Phone:  [phone]     amoxicillin-clavulanate 500-125 MG per tablet    methylPREDNISolone 4 MG tablet         These medications were sent to [facility] [address] [place] [facility] [address]  [facility]  [address] [facility] [facility] [address]     Phone:  [phone]     metroNIDAZOLE 500 MG tablet                Primary Care Provider Office Phone # Fax #    Kristen M Kehr, PA-C [phone] [address] [facility] [address]        Equal Access to Services     [facility] : Hadii aad ku hadasho Soomaali, waaxda luqadaha, qaybta kaalmada adeegyada, waxay idiin hayaan adeeg kharash laelizabethn . So [facility] [phone].    ATENCIÓN: Si habla español, tiene a fofana disposición servicios gratuitos de asistencia lingüística. [facility] [phone].    We comply with applicable federal civil rights laws and [place] laws. We do not discriminate on the basis of race, color, national origin, age, disability, sex, sexual orientation, or gender identity.            Thank you!     Thank you for choosing [facility]  for your care. Our goal is always to provide you with excellent care. Hearing back from our patients is one way we can continue to improve our services. Please take a few minutes to complete the written survey that you may receive in the mail after your visit with us. Thank you!             Your Updated Medication List - Protect others around you: Learn how to safely use, store and throw away your medicines at www.disposemymeds.org.          This list is accurate as of 2/26/18 11:59 PM.  Always use your most recent med list.                   Brand Name Dispense Instructions for use Diagnosis    albuterol 108 (90 BASE) MCG/ACT Inhaler    PROAIR HFA    8.5 g    Inhale 2 puffs into the lungs every 6 hours as needed for shortness of breath / dyspnea or wheezing    Bronchiectasis without acute exacerbation (H)       amoxicillin-clavulanate 500-125 MG per tablet    AUGMENTIN    28 tablet    Take 1 tablet by mouth 2 times daily    Sinus infection       azelastine 0.1 % spray    ASTELIN    30 mL    Spray 1 spray into both nostrils 2 times daily    Bronchiectasis without acute  exacerbation (H)       azithromycin 500 MG tablet    ZITHROMAX    14 tablet    Take 1 tablet (500 mg) by mouth Every Mon, Wed, Fri Morning Monday, Wednesday, Friday 1 tab    Adult bronchiectasis (H)       buPROPion 75 MG tablet    WELLBUTRIN    100 tablet    Take 1 tablet (75 mg) by mouth 2 times daily    Moderate single current episode of major depressive disorder (H)       cromolyn 20 MG/2ML neb solution    INTAL    120 mL    Take 2 mLs (20 mg) by nebulization 2 times daily    Moderate persistent asthma without complication, Bronchiectasis without complication (H)       ferrous sulfate 325 (65 FE) MG tablet    IRON    100 tablet    Take 1 tablet (325 mg) by mouth daily (with breakfast)    Bronchiectasis without acute exacerbation (H)       fluconazole 150 MG tablet    DIFLUCAN    4 tablet    TAKE 1 TABLET BY MOUTH TWICE A WEEK WHILE ON ANTIBIOTICS. INSURANCE LIMIT 1 MONTH    Yeast infection of the vagina       fluocinolone acetonide 0.01 % Oil     20 mL    PLACE 5 DROPS IN EAR(S) TWO TIMES A DAY FOR 7 DAYS    Ear itch       fluticasone 50 MCG/ACT spray    FLONASE    16 g    SPRAY 2 SPRAYS INTO BOTH NOSTRIL DAILY    Chronic pansinusitis       levofloxacin 750 MG tablet    LEVAQUIN    21 tablet    Take 1 tablet (750 mg) by mouth daily Stop azithromycin while on Levaquin. You may resume azithromycin once the Levaquin course is complete.    Bronchiectasis with acute lower respiratory infection (H)       methocarbamol 750 MG tablet    ROBAXIN    20 tablet    Take 1 tablet (750 mg) by mouth 3 times daily as needed for muscle spasms    Back muscle spasm       methylPREDNISolone 4 MG tablet    MEDROL DOSEPAK    21 tablet    Take as directed per patient instruction card    Sinusitis, chronic       metroNIDAZOLE 500 MG tablet    FLAGYL    14 tablet    Take 1 tablet (500 mg) by mouth 2 times daily    Vaginitis and vulvovaginitis       montelukast 10 MG tablet    SINGULAIR    30 tablet    TAKE ONE TABLET BY MOUTH IN THE  EVENING    Allergic rhinitis due to animals, Asthma       norethindrone-ethinyl estradiol 1-20 MG-MCG per tablet    JUNEL FE 1/20    84 tablet    Take 1 tablet by mouth daily    Encounter for initial prescription of contraceptive pills       omeprazole 20 MG CR capsule    priLOSEC    60 capsule    Take 1 capsule (20 mg) by mouth 2 times daily    Gastroesophageal reflux disease without esophagitis       predniSONE 10 MG tablet    DELTASONE    63 tablet    60mg daily x 3 days, 50mg daily x 3 days, 40mg daily x 3 days, 30mg daily x 3 days, 20mg daily x 3 days, 10mg daily x 3 days, then stop.    Adult bronchiectasis (H), Bronchiectasis with acute exacerbation (H)       scopolamine 72 hr patch    TRANSDERM    3 patch    Place 1 patch onto the skin every 72 hours    Adult bronchiectasis (H), Bronchiectasis with acute lower respiratory infection (H)       sodium chloride 0.65 % nasal spray    [employer] SALINE NASAL SPRAY    30 mL    Spray 1-2 sprays into both nostrils every 2 hours as needed for congestion (irrigation)    S/P functional endoscopic sinus surgery       tobramycin (PF) 300 MG/5ML neb solution    RAJI    280 mL    Take 5 mLs (300 mg) by nebulization 2 times daily 28 days on, 28 days off    Adult bronchiectasis (H), Bronchiectasis with acute exacerbation (H)

## 2018-02-26 NOTE — TELEPHONE ENCOUNTER
Notified patient of message below and offered to schedule annual exam.  Patient was driving so she stated she would call back later to schedule.

## 2018-02-27 NOTE — TELEPHONE ENCOUNTER
LM for the patient to return call to the clinic to discuss the below. Will await to hear from patient. Rebeca Russell RN, BSN   Responded via [employer] with GG note below. Rebeca Russell RN, BSN

## 2018-02-27 NOTE — TELEPHONE ENCOUNTER
PRIOR AUTHORIZATION DENIED    Medication: scopolamine patches (DENIED)    Denial Date: 2/27/2018    Denial Rational: Must try and fail 2 formulary alternatives, promethazine and dimenhydrinate.    Appeal Information: cash price for 3 patches is approximately $60. Since this was denied patient may pay out of pocket for this medication.

## 2018-02-27 NOTE — TELEPHONE ENCOUNTER
I think patient should have an office visit to have a wet prep done.  She may have bacterial vaginosis if the Diflucan is not helping.  The Diflucan will treat yeast only.  I can work her into my schedule for this.  If patient does not want an office visit, I can order something for bacterial vaginosis however I do recommend a wet prep to confirm the diagnosis.    Myranda Glass

## 2018-02-28 ENCOUNTER — MYC REFILL (OUTPATIENT)
Dept: PULMONOLOGY | Facility: CLINIC | Age: 33
End: 2018-02-28

## 2018-02-28 DIAGNOSIS — J47.9 BRONCHIECTASIS WITHOUT ACUTE EXACERBATION (H): ICD-10-CM

## 2018-02-28 DIAGNOSIS — J47.9 BRONCHIECTASIS WITHOUT COMPLICATION (H): ICD-10-CM

## 2018-02-28 RX ORDER — ALBUTEROL SULFATE 0.83 MG/ML
1 SOLUTION RESPIRATORY (INHALATION) 4 TIMES DAILY
Qty: 360 ML | Refills: 12 | Status: SHIPPED | OUTPATIENT
Start: 2018-02-28 | End: 2019-01-24

## 2018-02-28 RX ORDER — METRONIDAZOLE 500 MG/1
500 TABLET ORAL 2 TIMES DAILY
Qty: 14 TABLET | Refills: 0 | Status: SHIPPED | OUTPATIENT
Start: 2018-02-28 | End: 2018-03-28

## 2018-02-28 RX ORDER — BUDESONIDE 1 MG/2ML
INHALANT ORAL
Qty: 120 AMPULE | Refills: 6 | Status: SHIPPED | OUTPATIENT
Start: 2018-02-28 | End: 2019-01-24

## 2018-02-28 RX ORDER — BUDESONIDE 1 MG/2ML
SUSPENSION RESPIRATORY (INHALATION)
Qty: 240 ML | Refills: 6 | Status: CANCELLED | OUTPATIENT
Start: 2018-02-28

## 2018-02-28 NOTE — TELEPHONE ENCOUNTER
Message from [employer]:  Original authorizing provider: Kade Luu MD    Michelle CARRAda Tete would like a refill of the following medications:  budesonide (PULMICORT) 1 MG/2ML SUSP nebulizer solution [Kade Luu MD]  albuterol (2.5 MG/3ML) 0.083% neb solution [Kade Luu MD]    Preferred pharmacy: [id number]  [place]    Comment:  Could you update me albuterol neb prescription to QID and BID PRN. When I have had to neb extra, I run out before I can refill it again. Could you send this new scrip today please. [facility]

## 2018-02-28 NOTE — TELEPHONE ENCOUNTER
Responded via "[employer]t. Rebeca Russell, RN, BSN     Routing to  OB/GYN Provider pool. GG was working on an EVisit for this patient and is not in the clinic today to send treatment. Please review and advise if able to send treatment. Rebeca Russell RN, BSN

## 2018-02-28 NOTE — TELEPHONE ENCOUNTER
Will complete the E-visit and send prescription. Patient will need follow up if symptoms persist. Ani MCKEON CNP

## 2018-03-02 RX ORDER — BUDESONIDE 1 MG/2ML
INHALANT ORAL
Qty: 240 ML | Refills: 3 | Status: SHIPPED | OUTPATIENT
Start: 2018-03-02 | End: 2018-03-28

## 2018-03-28 ENCOUNTER — OFFICE VISIT (OUTPATIENT)
Dept: FAMILY MEDICINE | Facility: CLINIC | Age: 33
End: 2018-03-28
Payer: MEDICAID

## 2018-03-28 VITALS
HEART RATE: 96 BPM | OXYGEN SATURATION: 100 % | RESPIRATION RATE: 18 BRPM | TEMPERATURE: 98.5 F | HEIGHT: 65 IN | WEIGHT: 182.6 LBS | DIASTOLIC BLOOD PRESSURE: 70 MMHG | SYSTOLIC BLOOD PRESSURE: 110 MMHG | BODY MASS INDEX: 30.42 KG/M2

## 2018-03-28 DIAGNOSIS — Z30.011 ENCOUNTER FOR INITIAL PRESCRIPTION OF CONTRACEPTIVE PILLS: ICD-10-CM

## 2018-03-28 DIAGNOSIS — F32.1 MODERATE SINGLE CURRENT EPISODE OF MAJOR DEPRESSIVE DISORDER (H): Primary | ICD-10-CM

## 2018-03-28 PROCEDURE — 99214 OFFICE O/P EST MOD 30 MIN: CPT | Performed by: NURSE PRACTITIONER

## 2018-03-28 RX ORDER — DROSPIRENONE AND ETHINYL ESTRADIOL 0.02-3(28)
1 KIT ORAL DAILY
Qty: 84 TABLET | Refills: 1 | Status: SHIPPED | OUTPATIENT
Start: 2018-03-28 | End: 2018-08-03

## 2018-03-28 RX ORDER — BUPROPION HYDROCHLORIDE 150 MG/1
150 TABLET ORAL EVERY MORNING
Qty: 90 TABLET | Refills: 0 | Status: SHIPPED | OUTPATIENT
Start: 2018-03-28 | End: 2018-06-04

## 2018-03-28 ASSESSMENT — ANXIETY QUESTIONNAIRES
3. WORRYING TOO MUCH ABOUT DIFFERENT THINGS: SEVERAL DAYS
1. FEELING NERVOUS, ANXIOUS, OR ON EDGE: NOT AT ALL
7. FEELING AFRAID AS IF SOMETHING AWFUL MIGHT HAPPEN: SEVERAL DAYS
GAD7 TOTAL SCORE: 3
6. BECOMING EASILY ANNOYED OR IRRITABLE: NOT AT ALL
5. BEING SO RESTLESS THAT IT IS HARD TO SIT STILL: NOT AT ALL
IF YOU CHECKED OFF ANY PROBLEMS ON THIS QUESTIONNAIRE, HOW DIFFICULT HAVE THESE PROBLEMS MADE IT FOR YOU TO DO YOUR WORK, TAKE CARE OF THINGS AT HOME, OR GET ALONG WITH OTHER PEOPLE: NOT DIFFICULT AT ALL
2. NOT BEING ABLE TO STOP OR CONTROL WORRYING: SEVERAL DAYS

## 2018-03-28 ASSESSMENT — PAIN SCALES - GENERAL: PAINLEVEL: NO PAIN (0)

## 2018-03-28 ASSESSMENT — PATIENT HEALTH QUESTIONNAIRE - PHQ9: 5. POOR APPETITE OR OVEREATING: NOT AT ALL

## 2018-03-28 NOTE — LETTER
My Depression Action Plan  Name: Michelle Epstein   Date of Birth 1985  Date: 3/28/2018    My doctor: Kehr, Kristen M   My clinic: [address] [address] [phone]          GREEN    ZONE   Good Control    What it looks like:     Things are going generally well. You have normal up s and down s. You may even feel depressed from time to time, but bad moods usually last less than a day.   What you need to do:  1. Continue to care for yourself (see self care plan)  2. Check your depression survival kit and update it as needed  3. Follow your physician s recommendations including any medication.  4. Do not stop taking medication unless you consult with your physician first.           YELLOW         ZONE Getting Worse    What it looks like:     Depression is starting to interfere with your life.     It may be hard to get out of bed; you may be starting to isolate yourself from others.    Symptoms of depression are starting to last most all day and this has happened for several days.     You may have suicidal thoughts but they are not constant.   What you need to do:     1. Call your care team, your response to treatment will improve if you keep your care team informed of your progress. Yellow periods are signs an adjustment may need to be made.     2. Continue your self-care, even if you have to fake it!    3. Talk to someone in your support network    4. Open up your depression survival kit           RED    ZONE Medical Alert - Get Help    What it looks like:     Depression is seriously interfering with your life.     You may experience these or other symptoms: You can t get out of bed most days, can t work or engage in other necessary activities, you have trouble taking care of basic hygiene, or basic responsibilities, thoughts of suicide or death that will not go away, self-injurious behavior.     What you need to do:  1. Call your care team  and request a same-day appointment. If they are not available (weekends or after hours) call your local crisis line, emergency room or 911.            Depression Self Care Plan / Survival Kit    Self-Care for Depression  Here s the deal. Your body and mind are really not as separate as most people think.  What you do and think affects how you feel and how you feel influences what you do and think. This means if you do things that people who feel good do, it will help you feel better.  Sometimes this is all it takes.  There is also a place for medication and therapy depending on how severe your depression is, so be sure to consult with your medical provider and/ or Behavioral Health Consultant if your symptoms are worsening or not improving.     In order to better manage my stress, I will:    Exercise  Get some form of exercise, every day. This will help reduce pain and release endorphins, the  feel good  chemicals in your brain. This is almost as good as taking antidepressants!  This is not the same as joining a gym and then never going! (they count on that by the way ) It can be as simple as just going for a walk or doing some gardening, anything that will get you moving.      Hygiene   Maintain good hygiene (Get out of bed in the morning, Make your bed, Brush your teeth, Take a shower, and Get dressed like you were going to work, even if you are unemployed).  If your clothes don't fit try to get ones that do.    Diet  I will strive to eat foods that are good for me, drink plenty of water, and avoid excessive sugar, caffeine, alcohol, and other mood-altering substances.  Some foods that are helpful in depression are: complex carbohydrates, B vitamins, flaxseed, fish or fish oil, fresh fruits and vegetables.    Psychotherapy  I agree to participate in Individual Therapy (if recommended).    Medication  If prescribed medications, I agree to take them.  Missing doses can result in serious side effects.  I understand  that drinking alcohol, or other illicit drug use, may cause potential side effects.  I will not stop my medication abruptly without first discussing it with my provider.    Staying Connected With Others  I will stay in touch with my friends, family members, and my primary care provider/team.    Use your imagination  Be creative.  We all have a creative side; it doesn t matter if it s oil painting, sand castles, or mud pies! This will also kick up the endorphins.    Witness Beauty  (AKA stop and smell the roses) Take a look outside, even in mid-winter. Notice colors, textures. Watch the squirrels and birds.     Service to others  Be of service to others.  There is always someone else in need.  By helping others we can  get out of ourselves  and remember the really important things.  This also provides opportunities for practicing all the other parts of the program.    Humor  Laugh and be silly!  Adjust your TV habits for less news and crime-drama and more comedy.    Control your stress  Try breathing deep, massage therapy, biofeedback, and meditation. Find time to relax each day.     My support system    Clinic Contact:  Phone number:    Contact 1:  Phone number:    Contact 2:  Phone number:    Yazidi/:  Phone number:    Therapist:  Phone number:    Local crisis center:    Phone number:    Other community support:  Phone number:

## 2018-03-28 NOTE — PROGRESS NOTES
SUBJECTIVE:   Michelle Epstein is a 32 year old female who presents to clinic today for the following health issues:      Medication Followup of Wellbutrin    Taking Medication as prescribed: yes    Side Effects:  None    Medication Helping Symptoms:  yes         When taking birth control gets nauseated, hungry, headaches. Has not taken it in the past few days and has not. Prone to heavy menses. In past few years it has been more heavy.       Was on cruise when heart felt like it was 'fluttering and racing' for about 10 min. Had one other time over a year ago.     Problem list and histories reviewed & adjusted, as indicated.  Additional history: as documented    Patient Active Problem List   Diagnosis     CARDIOVASCULAR SCREENING; LDL GOAL LESS THAN 160     Adult bronchiectasis (H)     Dry eye syndrome     Asthma     Thyroid nodule     Bronchiectasis (H)     Encounter for initial prescription of contraceptive pills     Moderate single current episode of major depressive disorder (H)     Past Surgical History:   Procedure Laterality Date     ADENOIDECTOMY  1997     ENDOSCOPY       HC COLP CERVIX/UPPER VAGINA W BX CERVIX  2007    neg     NASAL/SINUS POLYPECTOMY  2015     OPTICAL TRACKING SYSTEM ENDOSCOPIC SINUS SURGERY Bilateral 1/11/2016    Procedure: OPTICAL TRACKING SYSTEM ENDOSCOPIC SINUS SURGERY;  Surgeon: Asmita Dallas MD;  Location: [facility] OR     REIMPLANT URETER CHILD  1989    bilateral     SINUS SURGERY  2009    x 2     THYROID BIOPSY  2012    neg     TONSILLECTOMY       TONSILLECTOMY  1997       Social History   Substance Use Topics     Smoking status: Never Smoker     Smokeless tobacco: Never Used      Comment: lives in nonsmoking household      Alcohol use 0.0 oz/week     0 Standard drinks or equivalent per week      Comment: rare, not in PG     Family History   Problem Relation Age of Onset     Neurologic Disorder Mother      brain tumor     HEART DISEASE Mother      SVT     Depression Mother       Migraines Mother      Alcohol/Drug Father      Cardiovascular Maternal Grandmother      Arthritis Maternal Grandmother      DIABETES Maternal Grandmother      HEART DISEASE Maternal Grandmother      AAA     Hypertension Maternal Grandmother      CEREBROVASCULAR DISEASE Maternal Grandmother      Asthma Maternal Grandmother      Unknown/Adopted Paternal Grandmother      Unknown/Adopted Paternal Grandfather      Genitourinary Problems Daughter      kidney reflux     Hypertension Maternal Grandfather      Dementia Maternal Grandfather      Neurologic Disorder Son 6     Seizures     Glaucoma No family hx of      Macular Degeneration No family hx of      CANCER No family hx of      Thyroid Disease No family hx of      ,         Current Outpatient Prescriptions   Medication Sig Dispense Refill     drospirenone-ethinyl estradiol (QUAN) 3-0.02 MG per tablet Take 1 tablet by mouth daily 84 tablet 1     buPROPion (WELLBUTRIN XL) 150 MG 24 hr tablet Take 1 tablet (150 mg) by mouth every morning 90 tablet 0     budesonide (PULMICORT) 1 MG/2ML SUSP neb solution Nebulized twice daily. 120 ampule 6     albuterol (2.5 MG/3ML) 0.083% neb solution Take 1 vial (2.5 mg) by nebulization 4 times daily 360 mL 12     ferrous sulfate (IRON) 325 (65 FE) MG tablet Take 1 tablet (325 mg) by mouth daily (with breakfast) 100 tablet 3     methocarbamol (ROBAXIN) 750 MG tablet Take 1 tablet (750 mg) by mouth 3 times daily as needed for muscle spasms 20 tablet 1     fluconazole (DIFLUCAN) 150 MG tablet TAKE 1 TABLET BY MOUTH TWICE A WEEK WHILE ON ANTIBIOTICS. INSURANCE LIMIT 1 MONTH 4 tablet 1     fluticasone (FLONASE) 50 MCG/ACT spray SPRAY 2 SPRAYS INTO BOTH NOSTRIL DAILY 16 g 6     omeprazole (PRILOSEC) 20 MG CR capsule Take 1 capsule (20 mg) by mouth 2 times daily 60 capsule 11     azithromycin (ZITHROMAX) 500 MG tablet Take 1 tablet (500 mg) by mouth Every Mon, Wed, Fri Morning Monday, Wednesday, Friday 1 tab 14 tablet 11     azelastine  "(ASTELIN) 0.1 % spray Spray 1 spray into both nostrils 2 times daily 30 mL 11     cromolyn (INTAL) 20 MG/2ML neb solution Take 2 mLs (20 mg) by nebulization 2 times daily 120 mL 11     albuterol (ALBUTEROL) 108 (90 BASE) MCG/ACT Inhaler Inhale 2 puffs into the lungs every 6 hours as needed for shortness of breath / dyspnea or wheezing 8.5 g 11     tobramycin, PF, (RAJI) 300 MG/5ML neb solution Take 5 mLs (300 mg) by nebulization 2 times daily 28 days on, 28 days off 280 mL 11     montelukast (SINGULAIR) 10 MG tablet TAKE ONE TABLET BY MOUTH IN THE EVENING 30 tablet 11     sodium chloride (CVS SALINE NASAL SPRAY) 0.65 % nasal spray Spray 1-2 sprays into both nostrils every 2 hours as needed for congestion (irrigation) 30 mL 1     [DISCONTINUED] budesonide (PULMICORT) 1 MG/2ML SUSP neb solution Nasal irrigation twice daily.  Nebulized twice daily. 240 mL 3     [DISCONTINUED] buPROPion (WELLBUTRIN) 75 MG tablet Take 1 tablet (75 mg) by mouth 2 times daily 100 tablet 0     Allergies   Allergen Reactions     Aspirin Unknown and Difficulty breathing     Contraindicated per her pulmonologist  Assume due to asthma and nasal polps     Nsaids Difficulty breathing     Assume due to asthma and nasal polyps  Contraindicated per her pulmonologist     Aspirin      Contraindicated per her pulmonologist     Tolmetin Unknown     Contraindicated per her pulmonologist       Reviewed and updated as needed this visit by clinical staff  Tobacco  Allergies  Meds  Problems  Med Hx  Surg Hx  Fam Hx  Soc Hx        Reviewed and updated as needed this visit by Provider  Allergies  Meds  Problems         ROS:  Constitutional, cardiovascular, pulmonary, gi and gu systems are negative, except as otherwise noted.    OBJECTIVE:     /70 (BP Location: Right arm, Patient Position: Sitting, Cuff Size: Adult Regular)  Pulse 96  Temp 98.5  F (36.9  C) (Oral)  Resp 18  Ht 1.645 m (5' 4.75\")  Wt 82.8 kg (182 lb 9.6 oz)  SpO2 100%  BMI " 30.62 kg/m2  Body mass index is 30.62 kg/(m^2).  GENERAL: healthy, alert and no distress  RESP: lungs clear to auscultation - no rales, rhonchi or wheezes  CV: regular rate and rhythm, normal S1 S2, no S3 or S4, no murmur, click or rub  MS: no gross musculoskeletal defects noted, no edema  Psych: normal affect, no anxiety    Diagnostic Test Results:  none     ASSESSMENT/PLAN:         1. Moderate single current episode of major depressive disorder (H)  Going well. Switch to daily dosing. Call if not helping and can change back to BID dosing. Follow up with provider in 3 months.  - buPROPion (WELLBUTRIN XL) 150 MG 24 hr tablet; Take 1 tablet (150 mg) by mouth every morning  Dispense: 90 tablet; Refill: 0    2. Encounter for initial prescription of contraceptive pills  Change OCP. Does not smoke, no hx/o blood clots or migraines. H/o heavy menses.   - drospirenone-ethinyl estradiol (QUAN) 3-0.02 MG per tablet; Take 1 tablet by mouth daily  Dispense: 84 tablet; Refill: 1    FUTURE APPOINTMENTS:       - Follow-up office or E-visit or phone in 3 months    MIKE Chatterjee, NP-C  [facility]

## 2018-03-28 NOTE — MR AVS SNAPSHOT
After Visit Summary   3/28/2018    Michelle Epstein    MRN: [id number]           Patient Information     Date Of Birth          1985        Visit Information        Provider Department      3/28/2018 11:40 AM Mindy Fink NP [facility]        Today's Diagnoses     Moderate single current episode of major depressive disorder (H)    -  1    Encounter for initial prescription of contraceptive pills           Follow-ups after your visit        Your next 10 appointments already scheduled     Jun 07, 2018 10:30 AM CDT   CF LOOP with UC PFL CF   [facility]monary Function Testing ([facility])    [address]  3rd Floor  [facility] [id number] [phone]            Jun 07, 2018 11:10 AM CDT   (Arrive by 10:55 AM)   RETURN CYSTIC FIBROSIS VISIT with Kade Luu MD   [facility] ([facility])    [address] [place] [address]  [address]  [facility] [id number] [phone]              Who to contact     If you have questions or need follow up information about today's clinic visit or your schedule please contact [facility] directly at [phone].  Normal or non-critical lab and imaging results will be communicated to you by MyChart, letter or phone within 4 business days after the clinic has received the results. If you do not hear from us within 7 days, please contact the clinic through MyChart or phone. If you have a critical or abnormal lab result, we will notify you by phone as soon as possible.  Submit refill requests through [employer] or call your pharmacy and they will forward the refill request to us. Please allow 3 business days for your refill to be completed.          Additional Information About Your Visit        MyChart Information     [employer] gives you secure access to your electronic health record. If you see a primary care provider, you can also send  "messages to your care team and make appointments. If you have questions, please call your primary care clinic.  If you do not have a primary care provider, please call [phone] and they will assist you.        Care EveryWhere ID     This is your Care EveryWhere ID. This could be used by other organizations to access your [facility] medical records  [id number]        Your Vitals Were     Pulse Temperature Respirations Height Pulse Oximetry BMI (Body Mass Index)    96 98.5  F (36.9  C) (Oral) 18 1.645 m (5' 4.75\") 100% 30.62 kg/m2       Blood Pressure from Last 3 Encounters:   03/28/18 110/70   02/09/18 116/80   01/15/18 128/74    Weight from Last 3 Encounters:   03/28/18 82.8 kg (182 lb 9.6 oz)   02/09/18 83 kg (183 lb)   01/15/18 82.6 kg (182 lb)              Today, you had the following     No orders found for display         Today's Medication Changes          These changes are accurate as of 3/28/18 11:47 AM.  If you have any questions, ask your nurse or doctor.               Start taking these medicines.        Dose/Directions    buPROPion 150 MG 24 hr tablet   Commonly known as:  WELLBUTRIN XL   Used for:  Moderate single current episode of major depressive disorder (H)   Replaces:  buPROPion 75 MG tablet   Started by:  Mindy Fink NP        Dose:  150 mg   Take 1 tablet (150 mg) by mouth every morning   Quantity:  90 tablet   Refills:  0       drospirenone-ethinyl estradiol 3-0.02 MG per tablet   Commonly known as:  QUAN   Used for:  Encounter for initial prescription of contraceptive pills   Started by:  Mindy Fink NP        Dose:  1 tablet   Take 1 tablet by mouth daily   Quantity:  84 tablet   Refills:  1         Stop taking these medicines if you haven't already. Please contact your care team if you have questions.     buPROPion 75 MG tablet   Commonly known as:  WELLBUTRIN   Replaced by:  buPROPion 150 MG 24 hr tablet   Stopped by:  Mindy Fink NP           norethindrone-ethinyl estradiol 1-20 " MG-MCG per tablet   Commonly known as:  JUNEL FE 1/20   Stopped by:  Mindy Fink NP                Where to get your medicines      These medications were sent to [id number] - [place] - [address]  [address] [place] [address]     Phone:  [phone]     buPROPion 150 MG 24 hr tablet    drospirenone-ethinyl estradiol 3-0.02 MG per tablet                Primary Care Provider Office Phone # Fax #    Kristen M Kehr, PA-C [phone] [address] [facility] [address]        Equal Access to Services     [facility]: Hadii aad ku hadasho Soomaali, waaxda luqadaha, qaybta kaalmada adeegyada, waxalba franklin haybella bergman . So [facility] [phone].    ATENCIÓN: Si habla español, tiene a fofana disposición servicios gratuitos de asistencia lingüística. [facility] [phone].    We comply with applicable federal civil rights laws and [place] laws. We do not discriminate on the basis of race, color, national origin, age, disability, sex, sexual orientation, or gender identity.            Thank you!     Thank you for choosing [facility]  for your care. Our goal is always to provide you with excellent care. Hearing back from our patients is one way we can continue to improve our services. Please take a few minutes to complete the written survey that you may receive in the mail after your visit with us. Thank you!             Your Updated Medication List - Protect others around you: Learn how to safely use, store and throw away your medicines at www.disposemymeds.org.          This list is accurate as of 3/28/18 11:47 AM.  Always use your most recent med list.                   Brand Name Dispense Instructions for use Diagnosis    * albuterol 108 (90 BASE) MCG/ACT Inhaler    PROAIR HFA    8.5 g    Inhale 2 puffs into the lungs every 6 hours as needed for shortness of breath / dyspnea or wheezing    Bronchiectasis without acute exacerbation (H)       *  albuterol (2.5 MG/3ML) 0.083% neb solution     360 mL    Take 1 vial (2.5 mg) by nebulization 4 times daily    Bronchiectasis without acute exacerbation (H)       azelastine 0.1 % spray    ASTELIN    30 mL    Spray 1 spray into both nostrils 2 times daily    Bronchiectasis without acute exacerbation (H)       azithromycin 500 MG tablet    ZITHROMAX    14 tablet    Take 1 tablet (500 mg) by mouth Every Mon, Wed, Fri Morning Monday, Wednesday, Friday 1 tab    Adult bronchiectasis (H)       budesonide 1 MG/2ML Susp neb solution    PULMICORT    120 ampule    Nebulized twice daily.    Bronchiectasis without complication (H)       buPROPion 150 MG 24 hr tablet    WELLBUTRIN XL    90 tablet    Take 1 tablet (150 mg) by mouth every morning    Moderate single current episode of major depressive disorder (H)       cromolyn 20 MG/2ML neb solution    INTAL    120 mL    Take 2 mLs (20 mg) by nebulization 2 times daily    Moderate persistent asthma without complication, Bronchiectasis without complication (H)       drospirenone-ethinyl estradiol 3-0.02 MG per tablet    QUAN    84 tablet    Take 1 tablet by mouth daily    Encounter for initial prescription of contraceptive pills       ferrous sulfate 325 (65 FE) MG tablet    IRON    100 tablet    Take 1 tablet (325 mg) by mouth daily (with breakfast)    Bronchiectasis without acute exacerbation (H)       fluconazole 150 MG tablet    DIFLUCAN    4 tablet    TAKE 1 TABLET BY MOUTH TWICE A WEEK WHILE ON ANTIBIOTICS. INSURANCE LIMIT 1 MONTH    Yeast infection of the vagina       fluticasone 50 MCG/ACT spray    FLONASE    16 g    SPRAY 2 SPRAYS INTO BOTH NOSTRIL DAILY    Chronic pansinusitis       methocarbamol 750 MG tablet    ROBAXIN    20 tablet    Take 1 tablet (750 mg) by mouth 3 times daily as needed for muscle spasms    Back muscle spasm       montelukast 10 MG tablet    SINGULAIR    30 tablet    TAKE ONE TABLET BY MOUTH IN THE EVENING    Allergic rhinitis due to animals, Asthma        omeprazole 20 MG CR capsule    priLOSEC    60 capsule    Take 1 capsule (20 mg) by mouth 2 times daily    Gastroesophageal reflux disease without esophagitis       sodium chloride 0.65 % nasal spray    [employer] SALINE NASAL SPRAY    30 mL    Spray 1-2 sprays into both nostrils every 2 hours as needed for congestion (irrigation)    S/P functional endoscopic sinus surgery       tobramycin (PF) 300 MG/5ML neb solution    RAJI    280 mL    Take 5 mLs (300 mg) by nebulization 2 times daily 28 days on, 28 days off    Adult bronchiectasis (H), Bronchiectasis with acute exacerbation (H)       * Notice:  This list has 2 medication(s) that are the same as other medications prescribed for you. Read the directions carefully, and ask your doctor or other care provider to review them with you.

## 2018-03-28 NOTE — NURSING NOTE
"Chief Complaint   Patient presents with     Depression       Initial /70 (BP Location: Right arm, Patient Position: Sitting, Cuff Size: Adult Regular)  Pulse 96  Temp 98.5  F (36.9  C) (Oral)  Resp 18  Ht 1.645 m (5' 4.75\")  Wt 82.8 kg (182 lb 9.6 oz)  SpO2 100%  BMI 30.62 kg/m2 Estimated body mass index is 30.62 kg/(m^2) as calculated from the following:    Height as of this encounter: 1.645 m (5' 4.75\").    Weight as of this encounter: 82.8 kg (182 lb 9.6 oz).  Medication Reconciliation: jacob Mcgill        "

## 2018-03-29 ASSESSMENT — ANXIETY QUESTIONNAIRES: GAD7 TOTAL SCORE: 3

## 2018-03-29 ASSESSMENT — PATIENT HEALTH QUESTIONNAIRE - PHQ9: SUM OF ALL RESPONSES TO PHQ QUESTIONS 1-9: 3

## 2018-04-20 ENCOUNTER — E-VISIT (OUTPATIENT)
Dept: FAMILY MEDICINE | Facility: CLINIC | Age: 33
End: 2018-04-20
Payer: MEDICAID

## 2018-04-20 DIAGNOSIS — J47.9 BRONCHIECTASIS WITHOUT COMPLICATION (H): ICD-10-CM

## 2018-04-20 DIAGNOSIS — J01.90 ACUTE SINUSITIS, RECURRENCE NOT SPECIFIED, UNSPECIFIED LOCATION: Primary | ICD-10-CM

## 2018-04-20 PROCEDURE — 99444 ZZC PHYSICIAN ONLINE EVALUATION & MANAGEMENT SERVICE: CPT | Performed by: NURSE PRACTITIONER

## 2018-04-20 RX ORDER — PREDNISONE 20 MG/1
40 TABLET ORAL DAILY
Qty: 10 TABLET | Refills: 0 | Status: SHIPPED | OUTPATIENT
Start: 2018-04-20 | End: 2018-05-18

## 2018-05-17 ENCOUNTER — E-VISIT (OUTPATIENT)
Dept: FAMILY MEDICINE | Facility: CLINIC | Age: 33
End: 2018-05-17
Payer: COMMERCIAL

## 2018-05-17 DIAGNOSIS — Z53.9 ERRONEOUS ENCOUNTER--DISREGARD: Primary | ICD-10-CM

## 2018-05-17 DIAGNOSIS — M62.81 GENERALIZED MUSCLE WEAKNESS: ICD-10-CM

## 2018-05-17 DIAGNOSIS — G44.219 EPISODIC TENSION-TYPE HEADACHE, NOT INTRACTABLE: Primary | ICD-10-CM

## 2018-05-17 DIAGNOSIS — Z83.49 FAMILY HISTORY OF PITUITARY DISEASE: ICD-10-CM

## 2018-05-17 DIAGNOSIS — R53.83 FATIGUE, UNSPECIFIED TYPE: ICD-10-CM

## 2018-05-17 DIAGNOSIS — Z13.220 SCREENING FOR CHOLESTEROL LEVEL: ICD-10-CM

## 2018-05-17 PROCEDURE — 99444 ZZC PHYSICIAN ONLINE EVALUATION & MANAGEMENT SERVICE: CPT | Performed by: NURSE PRACTITIONER

## 2018-05-17 ASSESSMENT — ANXIETY QUESTIONNAIRES
GAD7 TOTAL SCORE: 3
4. TROUBLE RELAXING: NOT AT ALL
6. BECOMING EASILY ANNOYED OR IRRITABLE: NOT AT ALL
7. FEELING AFRAID AS IF SOMETHING AWFUL MIGHT HAPPEN: SEVERAL DAYS
7. FEELING AFRAID AS IF SOMETHING AWFUL MIGHT HAPPEN: SEVERAL DAYS
GAD7 TOTAL SCORE: 3
2. NOT BEING ABLE TO STOP OR CONTROL WORRYING: SEVERAL DAYS
GAD7 TOTAL SCORE: 3
1. FEELING NERVOUS, ANXIOUS, OR ON EDGE: NOT AT ALL
5. BEING SO RESTLESS THAT IT IS HARD TO SIT STILL: NOT AT ALL
3. WORRYING TOO MUCH ABOUT DIFFERENT THINGS: SEVERAL DAYS

## 2018-05-17 ASSESSMENT — PATIENT HEALTH QUESTIONNAIRE - PHQ9
SUM OF ALL RESPONSES TO PHQ QUESTIONS 1-9: 3
SUM OF ALL RESPONSES TO PHQ QUESTIONS 1-9: 3
10. IF YOU CHECKED OFF ANY PROBLEMS, HOW DIFFICULT HAVE THESE PROBLEMS MADE IT FOR YOU TO DO YOUR WORK, TAKE CARE OF THINGS AT HOME, OR GET ALONG WITH OTHER PEOPLE: SOMEWHAT DIFFICULT

## 2018-05-18 ASSESSMENT — PATIENT HEALTH QUESTIONNAIRE - PHQ9: SUM OF ALL RESPONSES TO PHQ QUESTIONS 1-9: 3

## 2018-05-18 ASSESSMENT — ANXIETY QUESTIONNAIRES: GAD7 TOTAL SCORE: 3

## 2018-05-22 DIAGNOSIS — R53.83 FATIGUE, UNSPECIFIED TYPE: ICD-10-CM

## 2018-05-22 DIAGNOSIS — M62.81 GENERALIZED MUSCLE WEAKNESS: ICD-10-CM

## 2018-05-22 DIAGNOSIS — Z83.49 FAMILY HISTORY OF PITUITARY DISEASE: ICD-10-CM

## 2018-05-22 DIAGNOSIS — G44.219 EPISODIC TENSION-TYPE HEADACHE, NOT INTRACTABLE: ICD-10-CM

## 2018-05-22 DIAGNOSIS — Z13.220 SCREENING FOR CHOLESTEROL LEVEL: ICD-10-CM

## 2018-05-22 LAB
ALBUMIN SERPL-MCNC: 3.6 G/DL (ref 3.4–5)
ALP SERPL-CCNC: 28 U/L (ref 40–150)
ALT SERPL W P-5'-P-CCNC: 24 U/L (ref 0–50)
ANION GAP SERPL CALCULATED.3IONS-SCNC: 6 MMOL/L (ref 3–14)
AST SERPL W P-5'-P-CCNC: 10 U/L (ref 0–45)
BILIRUB SERPL-MCNC: 0.6 MG/DL (ref 0.2–1.3)
BUN SERPL-MCNC: 14 MG/DL (ref 7–30)
CALCIUM SERPL-MCNC: 8.9 MG/DL (ref 8.5–10.1)
CHLORIDE SERPL-SCNC: 107 MMOL/L (ref 94–109)
CHOLEST SERPL-MCNC: 188 MG/DL
CO2 SERPL-SCNC: 27 MMOL/L (ref 20–32)
CREAT SERPL-MCNC: 1.14 MG/DL (ref 0.52–1.04)
ERYTHROCYTE [DISTWIDTH] IN BLOOD BY AUTOMATED COUNT: 13.6 % (ref 10–15)
FSH SERPL-ACNC: 4.1 IU/L
GFR SERPL CREATININE-BSD FRML MDRD: 55 ML/MIN/1.7M2
GLUCOSE SERPL-MCNC: 89 MG/DL (ref 70–99)
HCT VFR BLD AUTO: 43.1 % (ref 35–47)
HDLC SERPL-MCNC: 48 MG/DL
HGB BLD-MCNC: 14.3 G/DL (ref 11.7–15.7)
LDLC SERPL CALC-MCNC: 65 MG/DL
LH SERPL-ACNC: 3.6 IU/L
MAGNESIUM SERPL-MCNC: 2.1 MG/DL (ref 1.6–2.3)
MCH RBC QN AUTO: 31.3 PG (ref 26.5–33)
MCHC RBC AUTO-ENTMCNC: 33.2 G/DL (ref 31.5–36.5)
MCV RBC AUTO: 94 FL (ref 78–100)
NONHDLC SERPL-MCNC: 140 MG/DL
PLATELET # BLD AUTO: 252 10E9/L (ref 150–450)
POTASSIUM SERPL-SCNC: 4.2 MMOL/L (ref 3.4–5.3)
PROLACTIN SERPL-MCNC: 11 UG/L (ref 3–27)
PROT SERPL-MCNC: 7.1 G/DL (ref 6.8–8.8)
RBC # BLD AUTO: 4.57 10E12/L (ref 3.8–5.2)
SODIUM SERPL-SCNC: 140 MMOL/L (ref 133–144)
TRIGL SERPL-MCNC: 373 MG/DL
TSH SERPL DL<=0.005 MIU/L-ACNC: 2.58 MU/L (ref 0.4–4)
WBC # BLD AUTO: 8.5 10E9/L (ref 4–11)

## 2018-05-22 PROCEDURE — 36415 COLL VENOUS BLD VENIPUNCTURE: CPT | Performed by: NURSE PRACTITIONER

## 2018-05-22 PROCEDURE — 80061 LIPID PANEL: CPT | Performed by: NURSE PRACTITIONER

## 2018-05-22 PROCEDURE — 83735 ASSAY OF MAGNESIUM: CPT | Performed by: NURSE PRACTITIONER

## 2018-05-22 PROCEDURE — 84443 ASSAY THYROID STIM HORMONE: CPT | Performed by: NURSE PRACTITIONER

## 2018-05-22 PROCEDURE — 84146 ASSAY OF PROLACTIN: CPT | Performed by: NURSE PRACTITIONER

## 2018-05-22 PROCEDURE — 85027 COMPLETE CBC AUTOMATED: CPT | Performed by: NURSE PRACTITIONER

## 2018-05-22 PROCEDURE — 80053 COMPREHEN METABOLIC PANEL: CPT | Performed by: NURSE PRACTITIONER

## 2018-05-22 PROCEDURE — 83002 ASSAY OF GONADOTROPIN (LH): CPT | Performed by: NURSE PRACTITIONER

## 2018-05-22 PROCEDURE — 83001 ASSAY OF GONADOTROPIN (FSH): CPT | Performed by: NURSE PRACTITIONER

## 2018-05-24 ENCOUNTER — RADIANT APPOINTMENT (OUTPATIENT)
Dept: MRI IMAGING | Facility: CLINIC | Age: 33
End: 2018-05-24
Attending: NURSE PRACTITIONER
Payer: COMMERCIAL

## 2018-05-24 DIAGNOSIS — R68.89 FORGETFULNESS: ICD-10-CM

## 2018-05-24 DIAGNOSIS — M62.81 GENERALIZED MUSCLE WEAKNESS: ICD-10-CM

## 2018-05-24 PROCEDURE — 70553 MRI BRAIN STEM W/O & W/DYE: CPT | Mod: TC

## 2018-05-24 PROCEDURE — A9585 GADOBUTROL INJECTION: HCPCS | Mod: JW

## 2018-05-24 RX ORDER — GADOBUTROL 604.72 MG/ML
10 INJECTION INTRAVENOUS ONCE
Status: COMPLETED | OUTPATIENT
Start: 2018-05-24 | End: 2018-05-24

## 2018-05-24 RX ADMIN — GADOBUTROL 8.5 ML: 604.72 INJECTION INTRAVENOUS at 13:35

## 2018-05-24 ASSESSMENT — ENCOUNTER SYMPTOMS
SNORES LOUDLY: 0
SLEEP DISTURBANCES DUE TO BREATHING: 0
DYSPNEA ON EXERTION: 1
SHORTNESS OF BREATH: 0
ORTHOPNEA: 0
MYALGIAS: 0
MUSCLE WEAKNESS: 1
INCREASED ENERGY: 1
BACK PAIN: 1
POSTURAL DYSPNEA: 1
PALPITATIONS: 1
STIFFNESS: 0
WEIGHT GAIN: 1
SYNCOPE: 0
LEG PAIN: 0
SPUTUM PRODUCTION: 1
EXERCISE INTOLERANCE: 1
ALTERED TEMPERATURE REGULATION: 0
DECREASED APPETITE: 0
NIGHT SWEATS: 0
HYPERTENSION: 0
CHILLS: 0
FATIGUE: 1
COUGH: 1
HEMOPTYSIS: 0
JOINT SWELLING: 0
LIGHT-HEADEDNESS: 0
ARTHRALGIAS: 0
COUGH DISTURBING SLEEP: 0
WEIGHT LOSS: 0
HALLUCINATIONS: 0
NECK PAIN: 1
POLYPHAGIA: 0
MUSCLE CRAMPS: 0
WHEEZING: 1
HYPOTENSION: 0
POLYDIPSIA: 0
FEVER: 0

## 2018-06-04 ENCOUNTER — OFFICE VISIT (OUTPATIENT)
Dept: FAMILY MEDICINE | Facility: CLINIC | Age: 33
End: 2018-06-04
Payer: COMMERCIAL

## 2018-06-04 VITALS
TEMPERATURE: 98.3 F | HEART RATE: 94 BPM | SYSTOLIC BLOOD PRESSURE: 106 MMHG | HEIGHT: 65 IN | WEIGHT: 179 LBS | OXYGEN SATURATION: 99 % | BODY MASS INDEX: 29.82 KG/M2 | RESPIRATION RATE: 18 BRPM | DIASTOLIC BLOOD PRESSURE: 80 MMHG

## 2018-06-04 DIAGNOSIS — R53.83 OTHER FATIGUE: ICD-10-CM

## 2018-06-04 DIAGNOSIS — N28.9 DECREASED RENAL FUNCTION: ICD-10-CM

## 2018-06-04 DIAGNOSIS — J47.9 BRONCHIECTASIS WITHOUT COMPLICATION (H): ICD-10-CM

## 2018-06-04 DIAGNOSIS — F32.1 MODERATE SINGLE CURRENT EPISODE OF MAJOR DEPRESSIVE DISORDER (H): ICD-10-CM

## 2018-06-04 DIAGNOSIS — F41.9 ANXIETY: Primary | ICD-10-CM

## 2018-06-04 DIAGNOSIS — G44.209 TENSION HEADACHE: ICD-10-CM

## 2018-06-04 DIAGNOSIS — L29.9 EAR ITCHING: ICD-10-CM

## 2018-06-04 LAB
ANION GAP SERPL CALCULATED.3IONS-SCNC: 7 MMOL/L (ref 3–14)
BUN SERPL-MCNC: 11 MG/DL (ref 7–30)
CALCIUM SERPL-MCNC: 8.9 MG/DL (ref 8.5–10.1)
CHLORIDE SERPL-SCNC: 108 MMOL/L (ref 94–109)
CO2 SERPL-SCNC: 26 MMOL/L (ref 20–32)
CREAT SERPL-MCNC: 0.95 MG/DL (ref 0.52–1.04)
GFR SERPL CREATININE-BSD FRML MDRD: 67 ML/MIN/1.7M2
GLUCOSE SERPL-MCNC: 83 MG/DL (ref 70–99)
POTASSIUM SERPL-SCNC: 4.1 MMOL/L (ref 3.4–5.3)
SODIUM SERPL-SCNC: 141 MMOL/L (ref 133–144)

## 2018-06-04 PROCEDURE — 99214 OFFICE O/P EST MOD 30 MIN: CPT | Performed by: NURSE PRACTITIONER

## 2018-06-04 PROCEDURE — 36415 COLL VENOUS BLD VENIPUNCTURE: CPT | Performed by: NURSE PRACTITIONER

## 2018-06-04 PROCEDURE — 80048 BASIC METABOLIC PNL TOTAL CA: CPT | Performed by: NURSE PRACTITIONER

## 2018-06-04 RX ORDER — HYDROXYZINE HYDROCHLORIDE 25 MG/1
25-50 TABLET, FILM COATED ORAL EVERY 6 HOURS PRN
Qty: 30 TABLET | Refills: 0 | Status: SHIPPED | OUTPATIENT
Start: 2018-06-04 | End: 2018-07-04

## 2018-06-04 RX ORDER — BUPROPION HYDROCHLORIDE 150 MG/1
150 TABLET ORAL EVERY MORNING
Qty: 90 TABLET | Refills: 1 | Status: SHIPPED | OUTPATIENT
Start: 2018-06-04 | End: 2019-01-24

## 2018-06-04 ASSESSMENT — ANXIETY QUESTIONNAIRES
5. BEING SO RESTLESS THAT IT IS HARD TO SIT STILL: NOT AT ALL
GAD7 TOTAL SCORE: 5
IF YOU CHECKED OFF ANY PROBLEMS ON THIS QUESTIONNAIRE, HOW DIFFICULT HAVE THESE PROBLEMS MADE IT FOR YOU TO DO YOUR WORK, TAKE CARE OF THINGS AT HOME, OR GET ALONG WITH OTHER PEOPLE: NOT DIFFICULT AT ALL
6. BECOMING EASILY ANNOYED OR IRRITABLE: NOT AT ALL
2. NOT BEING ABLE TO STOP OR CONTROL WORRYING: SEVERAL DAYS
1. FEELING NERVOUS, ANXIOUS, OR ON EDGE: SEVERAL DAYS
3. WORRYING TOO MUCH ABOUT DIFFERENT THINGS: SEVERAL DAYS
7. FEELING AFRAID AS IF SOMETHING AWFUL MIGHT HAPPEN: SEVERAL DAYS

## 2018-06-04 ASSESSMENT — PAIN SCALES - GENERAL: PAINLEVEL: NO PAIN (0)

## 2018-06-04 ASSESSMENT — PATIENT HEALTH QUESTIONNAIRE - PHQ9: 5. POOR APPETITE OR OVEREATING: SEVERAL DAYS

## 2018-06-04 NOTE — PROGRESS NOTES
SUBJECTIVE:   Michelle Epstein is a 33 year old female who presents to clinic today for the following health issues:      Depression Followup    Status since last visit: Improved     See PHQ-9 for current symptoms.  Other associated symptoms: None    Complicating factors:   Significant life event:  No   Current substance abuse:  None  Anxiety or Panic symptoms:  Yes-      PHQ-9 3/28/2018 5/17/2018 6/4/2018   Total Score 3 3 4   Q9: Suicide Ideation Not at all Not at all Not at all       PHQ-9  English  PHQ-9   Any Language  Suicide Assessment Five-step Evaluation and Treatment (SAFE-T)    Amount of exercise or physical activity: None    Problems taking medications regularly: No    Medication side effects: none    Diet: regular (no restrictions)      wellbutrin 150mg daily. Depression is improved but anxiety is still there. anxiety not completely controlled but she is more aware of how she feels.  Sleeps good. Did tear up, worrys about her health and her children. -okay to refill atarax if helping.  Not want to do therapy at this time, she is too busy.     Talk about work out plan: asking about keto diet. Has fatigue and labs were all normal.     Asthma/Bronchiectasis: goes in this Thursday for PFTs and will follow with pulmonologist also. Using nebs 4x/day plus inhalers. Feels slightly SOB when going up stairs but not enough to use the neb.     Family history of pituitary tumor-MRI normal.     Headaches: Robaxin-helping her headaches and excedrin migraine. Has allergy to aspirin had allergic reaction to ibuprofen. Almost like a pressure, tension, and then goes away. With change in birth control also headaches improved. Also sleeps props up for breathing also.     Decreased renal function: repeat labs today. She may have just been dehydrated.     Ears are itchy, using a steroid from pulmonology which is helping some    Problem list and histories reviewed & adjusted, as indicated.  Additional history: as  documented    Patient Active Problem List   Diagnosis     CARDIOVASCULAR SCREENING; LDL GOAL LESS THAN 160     Adult bronchiectasis (H)     Dry eye syndrome     Asthma     Thyroid nodule     Bronchiectasis (H)     Encounter for initial prescription of contraceptive pills     Moderate single current episode of major depressive disorder (H)     Tension headache     Past Surgical History:   Procedure Laterality Date     ADENOIDECTOMY  1997     ENDOSCOPY       HC COLP CERVIX/UPPER VAGINA W BX CERVIX  2007    neg     NASAL/SINUS POLYPECTOMY  2015     OPTICAL TRACKING SYSTEM ENDOSCOPIC SINUS SURGERY Bilateral 1/11/2016    Procedure: OPTICAL TRACKING SYSTEM ENDOSCOPIC SINUS SURGERY;  Surgeon: Asmita Dallas MD;  Location: [facility] OR     REIMPLANT URETER CHILD  1989    bilateral     SINUS SURGERY  2009    x 2     THYROID BIOPSY  2012    neg     TONSILLECTOMY       TONSILLECTOMY  1997       Social History   Substance Use Topics     Smoking status: Never Smoker     Smokeless tobacco: Never Used      Comment: lives in nonsmoking household      Alcohol use 0.0 oz/week     0 Standard drinks or equivalent per week      Comment: rare, not in PG     Family History   Problem Relation Age of Onset     Neurologic Disorder Mother      brain tumor     HEART DISEASE Mother      SVT     Depression Mother      Migraines Mother      Alcohol/Drug Father      Cardiovascular Maternal Grandmother      Arthritis Maternal Grandmother      DIABETES Maternal Grandmother      HEART DISEASE Maternal Grandmother      AAA     Hypertension Maternal Grandmother      CEREBROVASCULAR DISEASE Maternal Grandmother      Asthma Maternal Grandmother      Unknown/Adopted Paternal Grandmother      Unknown/Adopted Paternal Grandfather      Genitourinary Problems Daughter      kidney reflux     Hypertension Maternal Grandfather      Dementia Maternal Grandfather      Neurologic Disorder Son 6     Seizures     Glaucoma No family hx of      Macular Degeneration No family  hx of      CANCER No family hx of      Thyroid Disease No family hx of      ,         Current Outpatient Prescriptions   Medication Sig Dispense Refill     albuterol (2.5 MG/3ML) 0.083% neb solution Take 1 vial (2.5 mg) by nebulization 4 times daily 360 mL 12     albuterol (ALBUTEROL) 108 (90 BASE) MCG/ACT Inhaler Inhale 2 puffs into the lungs every 6 hours as needed for shortness of breath / dyspnea or wheezing 8.5 g 11     amoxicillin-clavulanate (AUGMENTIN) 875-125 MG per tablet Take 1 tablet by mouth 2 times daily 20 tablet 0     azelastine (ASTELIN) 0.1 % spray Spray 1 spray into both nostrils 2 times daily 30 mL 11     azithromycin (ZITHROMAX) 500 MG tablet Take 1 tablet (500 mg) by mouth Every Mon, Wed, Fri Morning Monday, Wednesday, Friday 1 tab 14 tablet 11     budesonide (PULMICORT) 1 MG/2ML SUSP neb solution Nebulized twice daily. 120 ampule 6     buPROPion (WELLBUTRIN XL) 150 MG 24 hr tablet Take 1 tablet (150 mg) by mouth every morning 90 tablet 1     cromolyn (INTAL) 20 MG/2ML neb solution Take 2 mLs (20 mg) by nebulization 2 times daily 120 mL 11     drospirenone-ethinyl estradiol (QUAN) 3-0.02 MG per tablet Take 1 tablet by mouth daily 84 tablet 1     ferrous sulfate (IRON) 325 (65 FE) MG tablet Take 1 tablet (325 mg) by mouth daily (with breakfast) 100 tablet 3     Fish Oil-Cholecalciferol (FISH OIL + D3 PO)        fluconazole (DIFLUCAN) 150 MG tablet TAKE 1 TABLET BY MOUTH TWICE A WEEK WHILE ON ANTIBIOTICS. INSURANCE LIMIT 1 MONTH 4 tablet 1     fluticasone (FLONASE) 50 MCG/ACT spray SPRAY 2 SPRAYS INTO BOTH NOSTRIL DAILY 16 g 6     hydrOXYzine (ATARAX) 25 MG tablet Take 1-2 tablets (25-50 mg) by mouth every 6 hours as needed for itching 30 tablet 0     methocarbamol (ROBAXIN) 750 MG tablet Take 1 tablet (750 mg) by mouth 3 times daily as needed for muscle spasms 20 tablet 1     montelukast (SINGULAIR) 10 MG tablet TAKE ONE TABLET BY MOUTH IN THE EVENING 30 tablet 11     omeprazole (PRILOSEC) 20 MG  "CR capsule Take 1 capsule (20 mg) by mouth 2 times daily 60 capsule 11     sodium chloride (CVS SALINE NASAL SPRAY) 0.65 % nasal spray Spray 1-2 sprays into both nostrils every 2 hours as needed for congestion (irrigation) 30 mL 1     tobramycin, PF, (RAJI) 300 MG/5ML neb solution Take 5 mLs (300 mg) by nebulization 2 times daily 28 days on, 28 days off 280 mL 11     [DISCONTINUED] buPROPion (WELLBUTRIN XL) 150 MG 24 hr tablet Take 1 tablet (150 mg) by mouth every morning 90 tablet 0     Allergies   Allergen Reactions     Aspirin Unknown and Difficulty breathing     Contraindicated per her pulmonologist  Assume due to asthma and nasal polps     Nsaids Difficulty breathing     Assume due to asthma and nasal polyps  Contraindicated per her pulmonologist     Aspirin      Contraindicated per her pulmonologist     Tolmetin Unknown     Contraindicated per her pulmonologist       Reviewed and updated as needed this visit by clinical staff  Tobacco  Allergies  Meds  Problems  Med Hx  Surg Hx  Fam Hx  Soc Hx        Reviewed and updated as needed this visit by Provider  Allergies  Meds  Problems         ROS:  Constitutional, HEENT, cardiovascular, pulmonary, gi and gu systems are negative, except as otherwise noted.    OBJECTIVE:     /80 (BP Location: Right arm, Patient Position: Sitting, Cuff Size: Adult Regular)  Pulse 94  Temp 98.3  F (36.8  C) (Oral)  Resp 18  Ht 1.645 m (5' 4.75\")  Wt 81.2 kg (179 lb)  LMP 04/30/2018  SpO2 99%  BMI 30.02 kg/m2  Body mass index is 30.02 kg/(m^2).  GENERAL: healthy, alert and no distress  HENT: ear canals and TM's normal  RESP: lungs slight rhonchi, wheezing, and decreased airflow to auscultation  CV: regular rate and rhythm, normal S1 S2, no S3 or S4, no murmur, click or rub  ABDOMEN: soft, nontender, no hepatosplenomegaly, no masses and bowel sounds normal  MS: no gross musculoskeletal defects noted, no edema  PSYCH: mentation appears normal, affect normal to " slightly anxious. Was teary when talking about her worries of her health and her childresns.    Diagnostic Test Results:  none     ASSESSMENT/PLAN:         1. Anxiety  Anxiety seems slightly worse or she is just more aware of it. Depression improved. Trial atarax. She is not interested in therapy, she is too busy. Not interested in anything stronger like ativan or xanax either. Call for refill if working  - hydrOXYzine (ATARAX) 25 MG tablet; Take 1-2 tablets (25-50 mg) by mouth every 6 hours as needed for itching  Dispense: 30 tablet; Refill: 0    2. Moderate single current episode of major depressive disorder (H)  Stable. Refilled.   - buPROPion (WELLBUTRIN XL) 150 MG 24 hr tablet; Take 1 tablet (150 mg) by mouth every morning  Dispense: 90 tablet; Refill: 1    3. Decreased renal function  Recheck. Was decreased, unknown reason few weeks ago.   - Basic metabolic panel    - Basic metabolic panel    4. Bronchiectasis without complication (H)  Seems stable. Follow up with pulmonology Thursday, have them CC me onto their note    6. Ear itching  WNL    7. Tension headache  Improved with robaxin. Could be related to stress, sleeping upright for breathing    8. Other fatigue  Increase exercise, okay to try keto diet. That should help fatigue. All labs normal.         FUTURE APPOINTMENTS:       - Follow-up office or E-visit in 6 months or prn    MIKE Chatterjee, NP-C  [facility]

## 2018-06-04 NOTE — MR AVS SNAPSHOT
After Visit Summary   6/4/2018    Michelle Epstein    MRN: [id number]           Patient Information     Date Of Birth          1985        Visit Information        Provider Department      6/4/2018 10:40 AM Mindy Fink NP [facility]        Today's Diagnoses     Anxiety    -  1    Moderate single current episode of major depressive disorder (H)        Decreased renal function        Bronchiectasis without complication (H)        Mild intermittent asthma without complication        Ear itching        Tension headache        Other fatigue          Care Instructions    Vit D 2000 daily          Follow-ups after your visit        Your next 10 appointments already scheduled     Jun 07, 2018 10:30 AM CDT   CF LOOP with  PFL CF   [facility] ([facility])    [address]  3rd Floor  [facility] [id number] [phone]            Jun 07, 2018 11:10 AM CDT   (Arrive by 10:55 AM)   RETURN CYSTIC FIBROSIS VISIT with Kade Luu MD   [facility] ([facility])    [address]  [address] [place] [id number] [phone]              Who to contact     If you have questions or need follow up information about today's clinic visit or your schedule please contact [facility] directly at [phone].  Normal or non-critical lab and imaging results will be communicated to you by MyChart, letter or phone within 4 business days after the clinic has received the results. If you do not hear from us within 7 days, please contact the clinic through MyChart or phone. If you have a critical or abnormal lab result, we will notify you by phone as soon as possible.  Submit refill requests through [employer] or call your pharmacy and they will forward the refill request to us. Please allow 3 business days for your refill to be completed.           "Additional Information About Your Visit        MyChart Information     [employer] gives you secure access to your electronic health record. If you see a primary care provider, you can also send messages to your care team and make appointments. If you have questions, please call your primary care clinic.  If you do not have a primary care provider, please call [phone] and they will assist you.        Care EveryWhere ID     This is your Care EveryWhere ID. This could be used by other organizations to access your [facility] records  [id number]        Your Vitals Were     Pulse Temperature Respirations Height Last Period Pulse Oximetry    94 98.3  F (36.8  C) (Oral) 18 1.645 m (5' 4.75\") 04/30/2018 99%    BMI (Body Mass Index)                   30.02 kg/m2            Blood Pressure from Last 3 Encounters:   06/04/18 106/80   03/28/18 110/70   02/09/18 116/80    Weight from Last 3 Encounters:   06/04/18 81.2 kg (179 lb)   03/28/18 82.8 kg (182 lb 9.6 oz)   02/09/18 83 kg (183 lb)              We Performed the Following     Basic metabolic panel          Today's Medication Changes          These changes are accurate as of 6/4/18 11:42 AM.  If you have any questions, ask your nurse or doctor.               Start taking these medicines.        Dose/Directions    hydrOXYzine 25 MG tablet   Commonly known as:  ATARAX   Used for:  Anxiety   Started by:  Mindy Fink NP        Dose:  25-50 mg   Take 1-2 tablets (25-50 mg) by mouth every 6 hours as needed for itching   Quantity:  30 tablet   Refills:  0            Where to get your medicines      These medications were sent to [id number] - [place] - [address] [address]     Phone:  [phone]     buPROPion 150 MG 24 hr tablet    hydrOXYzine 25 MG tablet                Primary Care Provider Office Phone # Fax #    Mindy Fink [phone] [address]  [facility] [address]        Equal Access " to Services     SETH JOHNSON : Gustabo Mccloud, wasandra lozoya, qahermelindamendez rabagojeffreykalina lynn. So [facility] [phone].    ATENCIÓN: Si osmanila jessy, tiene a fofana disposición servicios gratuitos de asistencia lingüística. Llame al [phone].    We comply with applicable federal civil rights laws and [place] laws. We do not discriminate on the basis of race, color, national origin, age, disability, sex, sexual orientation, or gender identity.            Thank you!     Thank you for choosing [facility]  for your care. Our goal is always to provide you with excellent care. Hearing back from our patients is one way we can continue to improve our services. Please take a few minutes to complete the written survey that you may receive in the mail after your visit with us. Thank you!             Your Updated Medication List - Protect others around you: Learn how to safely use, store and throw away your medicines at www.disposemymeds.org.          This list is accurate as of 6/4/18 11:42 AM.  Always use your most recent med list.                   Brand Name Dispense Instructions for use Diagnosis    * albuterol 108 (90 Base) MCG/ACT Inhaler    PROAIR HFA    8.5 g    Inhale 2 puffs into the lungs every 6 hours as needed for shortness of breath / dyspnea or wheezing    Bronchiectasis without acute exacerbation (H)       * albuterol (2.5 MG/3ML) 0.083% neb solution     360 mL    Take 1 vial (2.5 mg) by nebulization 4 times daily    Bronchiectasis without acute exacerbation (H)       amoxicillin-clavulanate 875-125 MG per tablet    AUGMENTIN    20 tablet    Take 1 tablet by mouth 2 times daily    Acute sinusitis, recurrence not specified, unspecified location       azelastine 0.1 % spray    ASTELIN    30 mL    Spray 1 spray into both nostrils 2 times daily    Bronchiectasis without acute exacerbation (H)       azithromycin 500 MG tablet    ZITHROMAX    14  tablet    Take 1 tablet (500 mg) by mouth Every Mon, Wed, Fri Morning Monday, Wednesday, Friday 1 tab    Adult bronchiectasis (H)       budesonide 1 MG/2ML Susp neb solution    PULMICORT    120 ampule    Nebulized twice daily.    Bronchiectasis without complication (H)       buPROPion 150 MG 24 hr tablet    WELLBUTRIN XL    90 tablet    Take 1 tablet (150 mg) by mouth every morning    Moderate single current episode of major depressive disorder (H)       cromolyn 20 MG/2ML neb solution    INTAL    120 mL    Take 2 mLs (20 mg) by nebulization 2 times daily    Moderate persistent asthma without complication, Bronchiectasis without complication (H)       drospirenone-ethinyl estradiol 3-0.02 MG per tablet    QUAN    84 tablet    Take 1 tablet by mouth daily    Encounter for initial prescription of contraceptive pills       ferrous sulfate 325 (65 Fe) MG tablet    IRON    100 tablet    Take 1 tablet (325 mg) by mouth daily (with breakfast)    Bronchiectasis without acute exacerbation (H)       FISH OIL + D3 PO           fluconazole 150 MG tablet    DIFLUCAN    4 tablet    TAKE 1 TABLET BY MOUTH TWICE A WEEK WHILE ON ANTIBIOTICS. INSURANCE LIMIT 1 MONTH    Yeast infection of the vagina       fluticasone 50 MCG/ACT spray    FLONASE    16 g    SPRAY 2 SPRAYS INTO BOTH NOSTRIL DAILY    Chronic pansinusitis       hydrOXYzine 25 MG tablet    ATARAX    30 tablet    Take 1-2 tablets (25-50 mg) by mouth every 6 hours as needed for itching    Anxiety       methocarbamol 750 MG tablet    ROBAXIN    20 tablet    Take 1 tablet (750 mg) by mouth 3 times daily as needed for muscle spasms    Back muscle spasm       montelukast 10 MG tablet    SINGULAIR    30 tablet    TAKE ONE TABLET BY MOUTH IN THE EVENING    Allergic rhinitis due to animals, Asthma       omeprazole 20 MG CR capsule    priLOSEC    60 capsule    Take 1 capsule (20 mg) by mouth 2 times daily    Gastroesophageal reflux disease without esophagitis       sodium chloride  0.65 % nasal spray    [facility] SALINE NASAL SPRAY    30 mL    Spray 1-2 sprays into both nostrils every 2 hours as needed for congestion (irrigation)    S/P functional endoscopic sinus surgery       tobramycin (PF) 300 MG/5ML neb solution    RAJI    280 mL    Take 5 mLs (300 mg) by nebulization 2 times daily 28 days on, 28 days off    Adult bronchiectasis (H), Bronchiectasis with acute exacerbation (H)       * Notice:  This list has 2 medication(s) that are the same as other medications prescribed for you. Read the directions carefully, and ask your doctor or other care provider to review them with you.

## 2018-06-04 NOTE — LETTER
My Asthma Action Plan  Name: Michelle Epstein   YOB: 1985  Date: 6/4/2018   My doctor: Mindy Fink NP   My clinic: [facility]        My Control Medicine: Budesonide (Pulmicort) nebulizer solution -  1mg/2ml BID  My Rescue Medicine: Albuterol (Proair/Ventolin/Proventil) inhaler q 4-6 hr prn   My Asthma Severity: mild persistent  Avoid your asthma triggers: smoke, upper respiratory infections, dust mites and pollens               GREEN ZONE   Good Control    I feel good    No cough or wheeze    Can work, sleep and play without asthma symptoms       Take your asthma control medicine every day.     1. If exercise triggers your asthma, take your rescue medication    15 minutes before exercise or sports, and    During exercise if you have asthma symptoms  2. Spacer to use with inhaler: If you have a spacer, make sure to use it with your inhaler             YELLOW ZONE Getting Worse  I have ANY of these:    I do not feel good    Cough or wheeze    Chest feels tight    Wake up at night   1. Keep taking your Green Zone medications  2. Start taking your rescue medicine:    every 20 minutes for up to 1 hour. Then every 4 hours for 24-48 hours.  3. If you stay in the Yellow Zone for more than 12-24 hours, contact your doctor.  4. If you do not return to the Green Zone in 12-24 hours or you get worse, start taking your oral steroid medicine if prescribed by your provider.           RED ZONE Medical Alert - Get Help  I have ANY of these:    I feel awful    Medicine is not helping    Breathing getting harder    Trouble walking or talking    Nose opens wide to breathe       1. Take your rescue medicine NOW  2. If your provider has prescribed an oral steroid medicine, start taking it NOW  3. Call your doctor NOW  4. If you are still in the Red Zone after 20 minutes and you have not reached your doctor:    Take your rescue medicine again and    Call 911 or go to the emergency room right away    See  your regular doctor within 2 weeks of an Emergency Room or Urgent Care visit for follow-up treatment.          Annual Reminders:  Meet with Asthma Educator,  Flu Shot in the Fall, consider Pneumonia Vaccination for patients with asthma (aged 19 and older).    Pharmacy:    [facility] - [place], [place] - [address], [address]  [id number] - [place], [place] - [address] [facility] - [place], [place] - [address] [facility] [address] [employer] - [place], [place] - [address]  [id number] - [place], [place] - [address]                      Asthma Triggers  How To Control Things That Make Your Asthma Worse    Triggers are things that make your asthma worse.  Look at the list below to help you find your triggers and what you can do about them.  You can help prevent asthma flare-ups by staying away from your triggers.      Trigger                                                          What you can do   Cigarette Smoke  Tobacco smoke can make asthma worse. Do not allow smoking in your home, car or around you.  Be sure no one smokes at a child s day care or school.  If you smoke, ask your health care provider for ways to help you quit.  Ask family members to quit too.  Ask your health care provider for a referral to Quit Plan to help you quit smoking, or call 2-616-199-PLAN.     Colds, Flu, Bronchitis  These are common triggers of asthma. Wash your hands often.  Don t touch your eyes, nose or mouth.  Get a flu shot every year.     Dust Mites  These are tiny bugs that live in cloth or carpet. They are too small to see. Wash sheets and blankets in hot water every week.   Encase pillows and mattress in dust mite proof covers.  Avoid having carpet if you can. If you have carpet, vacuum weekly.   Use a dust mask and HEPA vacuum.   Pollen and Outdoor Mold  Some people are allergic to trees, grass, or weed pollen, or  molds. Try to keep your windows closed.  Limit time out doors when pollen count is high.   Ask you health care provider about taking medicine during allergy season.     Animal Dander  Some people are allergic to skin flakes, urine or saliva from pets with fur or feathers. Keep pets with fur or feathers out of your home.    If you can t keep the pet outdoors, then keep the pet out of your bedroom.  Keep the bedroom door closed.  Keep pets off cloth furniture and away from stuffed toys.     Mice, Rats, and Cockroaches  Some people are allergic to the waste from these pests.   Cover food and garbage.  Clean up spills and food crumbs.  Store grease in the refrigerator.   Keep food out of the bedroom.   Indoor Mold  This can be a trigger if your home has high moisture. Fix leaking faucets, pipes, or other sources of water.   Clean moldy surfaces.  Dehumidify basement if it is damp and smelly.   Smoke, Strong Odors, and Sprays  These can reduce air quality. Stay away from strong odors and sprays, such as perfume, powder, hair spray, paints, smoke incense, paint, cleaning products, candles and new carpet.   Exercise or Sports  Some people with asthma have this trigger. Be active!  Ask your doctor about taking medicine before sports or exercise to prevent symptoms.    Warm up for 5-10 minutes before and after sports or exercise.     Other Triggers of Asthma  Cold air:  Cover your nose and mouth with a scarf.  Sometimes laughing or crying can be a trigger.  Some medicines and food can trigger asthma.

## 2018-06-05 ASSESSMENT — ANXIETY QUESTIONNAIRES: GAD7 TOTAL SCORE: 5

## 2018-06-05 ASSESSMENT — ASTHMA QUESTIONNAIRES: ACT_TOTALSCORE: 18

## 2018-06-05 ASSESSMENT — PATIENT HEALTH QUESTIONNAIRE - PHQ9: SUM OF ALL RESPONSES TO PHQ QUESTIONS 1-9: 4

## 2018-06-07 ENCOUNTER — OFFICE VISIT (OUTPATIENT)
Dept: PULMONOLOGY | Facility: CLINIC | Age: 33
End: 2018-06-07
Attending: INTERNAL MEDICINE
Payer: COMMERCIAL

## 2018-06-07 VITALS
RESPIRATION RATE: 17 BRPM | SYSTOLIC BLOOD PRESSURE: 111 MMHG | HEIGHT: 65 IN | BODY MASS INDEX: 29.57 KG/M2 | HEART RATE: 102 BPM | WEIGHT: 177.47 LBS | OXYGEN SATURATION: 98 % | DIASTOLIC BLOOD PRESSURE: 78 MMHG

## 2018-06-07 DIAGNOSIS — J45.40 MODERATE PERSISTENT ASTHMA WITHOUT COMPLICATION: ICD-10-CM

## 2018-06-07 DIAGNOSIS — J47.9 ADULT BRONCHIECTASIS (H): Primary | ICD-10-CM

## 2018-06-07 LAB
EXPTIME-PRE: 10.22 SEC
FEF2575-%PRED-PRE: 53 %
FEF2575-PRE: 1.86 L/SEC
FEF2575-PRED: 3.5 L/SEC
FEFMAX-%PRED-PRE: 107 %
FEFMAX-PRE: 7.68 L/SEC
FEFMAX-PRED: 7.17 L/SEC
FEV1-%PRED-PRE: 86 %
FEV1-PRE: 2.8 L
FEV1FEV6-PRE: 75 %
FEV1FEV6-PRED: 85 %
FEV1FVC-PRE: 75 %
FEV1FVC-PRED: 84 %
FIFMAX-PRE: 4.16 L/SEC
FVC-%PRED-PRE: 95 %
FVC-PRE: 3.73 L
FVC-PRED: 3.9 L

## 2018-06-07 PROCEDURE — G0463 HOSPITAL OUTPT CLINIC VISIT: HCPCS | Mod: ZF

## 2018-06-07 ASSESSMENT — PAIN SCALES - GENERAL: PAINLEVEL: NO PAIN (0)
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
## 2018-06-07 NOTE — PATIENT INSTRUCTIONS
Continue current medication, nebs and vest therapy.   Continue current exercise program.   Let us know if your exercise tolerance declines.
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
## 2018-06-07 NOTE — MR AVS SNAPSHOT
After Visit Summary   6/7/2018    Michelle Epstein    MRN: [id number]           Patient Information     Date Of Birth          1985        Visit Information        Provider Department      6/7/2018 11:10 AM Kade Luu MD [facility]        Today's Diagnoses     Adult bronchiectasis (H)    -  1    Moderate persistent asthma without complication          Care Instructions    Continue current medication, nebs and vest therapy.   Continue current exercise program.   Let us know if your exercise tolerance declines.          Follow-ups after your visit        Follow-up notes from your care team     Return in about 4 months (around 10/7/2018).      Who to contact     If you have questions or need follow up information about today's clinic visit or your schedule please contact [facility] directly at [phone].  Normal or non-critical lab and imaging results will be communicated to you by Parachutehart, letter or phone within 4 business days after the clinic has received the results. If you do not hear from us within 7 days, please contact the clinic through Parachutehart or phone. If you have a critical or abnormal lab result, we will notify you by phone as soon as possible.  Submit refill requests through ExSafe or call your pharmacy and they will forward the refill request to us. Please allow 3 business days for your refill to be completed.          Additional Information About Your Visit        MyChart Information     ExSafe gives you secure access to your electronic health record. If you see a primary care provider, you can also send messages to your care team and make appointments. If you have questions, please call your primary care clinic.  If you do not have a primary care provider, please call [phone] and they will assist you.        Care EveryWhere ID     This is your Care EveryWhere ID. This could be used by other  "organizations to access your [facility] medical records  [id number]        Your Vitals Were     Pulse Respirations Height Pulse Oximetry BMI (Body Mass Index)       102 17 1.645 m (5' 4.75\") 98% 29.76 kg/m2        Blood Pressure from Last 3 Encounters:   06/07/18 111/78   06/04/18 106/80   03/28/18 110/70    Weight from Last 3 Encounters:   06/07/18 80.5 kg (177 lb 7.5 oz)   06/04/18 81.2 kg (179 lb)   03/28/18 82.8 kg (182 lb 9.6 oz)                 Today's Medication Changes          These changes are accurate as of 6/7/18 11:59 PM.  If you have any questions, ask your nurse or doctor.               Stop taking these medicines if you haven't already. Please contact your care team if you have questions.     amoxicillin-clavulanate 875-125 MG per tablet   Commonly known as:  AUGMENTIN   Stopped by:  Kade Luu MD                    Primary Care Provider Office Phone # Fax #    Mindy RADHA Fink [phone] [address]  [facility] [address]        Equal Access to Services     [facility] AH: Hadii aad ku hadasho Soomaali, waaxda luqadaha, qaybta kaalmada adeegyada, kalina franklin haymarissan alma fenton. So [facility] [phone].    ATENCIÓN: Si habla español, tiene a fofana disposición servicios gratuitos de asistencia lingüística. Llame al [phone].    We comply with applicable federal civil rights laws and [place] laws. We do not discriminate on the basis of race, color, national origin, age, disability, sex, sexual orientation, or gender identity.            Thank you!     Thank you for choosing [facility]  for your care. Our goal is always to provide you with excellent care. Hearing back from our patients is one way we can continue to improve our services. Please take a few minutes to complete the written survey that you may receive in the mail after your visit with us. Thank you!             Your Updated Medication List - Protect others " around you: Learn how to safely use, store and throw away your medicines at www.disposemymeds.org.          This list is accurate as of 6/7/18 11:59 PM.  Always use your most recent med list.                   Brand Name Dispense Instructions for use Diagnosis    * albuterol 108 (90 Base) MCG/ACT Inhaler    PROAIR HFA    8.5 g    Inhale 2 puffs into the lungs every 6 hours as needed for shortness of breath / dyspnea or wheezing    Bronchiectasis without acute exacerbation (H)       * albuterol (2.5 MG/3ML) 0.083% neb solution     360 mL    Take 1 vial (2.5 mg) by nebulization 4 times daily    Bronchiectasis without acute exacerbation (H)       azelastine 0.1 % spray    ASTELIN    30 mL    Spray 1 spray into both nostrils 2 times daily    Bronchiectasis without acute exacerbation (H)       azithromycin 500 MG tablet    ZITHROMAX    14 tablet    Take 1 tablet (500 mg) by mouth Every Mon, Wed, Fri Morning Monday, Wednesday, Friday 1 tab    Adult bronchiectasis (H)       budesonide 1 MG/2ML Susp neb solution    PULMICORT    120 ampule    Nebulized twice daily.    Bronchiectasis without complication (H)       buPROPion 150 MG 24 hr tablet    WELLBUTRIN XL    90 tablet    Take 1 tablet (150 mg) by mouth every morning    Moderate single current episode of major depressive disorder (H)       cromolyn 20 MG/2ML neb solution    INTAL    120 mL    Take 2 mLs (20 mg) by nebulization 2 times daily    Moderate persistent asthma without complication, Bronchiectasis without complication (H)       drospirenone-ethinyl estradiol 3-0.02 MG per tablet    QUAN    84 tablet    Take 1 tablet by mouth daily    Encounter for initial prescription of contraceptive pills       ferrous sulfate 325 (65 Fe) MG tablet    IRON    100 tablet    Take 1 tablet (325 mg) by mouth daily (with breakfast)    Bronchiectasis without acute exacerbation (H)       FISH OIL + D3 PO           fluconazole 150 MG tablet    DIFLUCAN    4 tablet    TAKE 1 TABLET BY  MOUTH TWICE A WEEK WHILE ON ANTIBIOTICS. INSURANCE LIMIT 1 MONTH    Yeast infection of the vagina       fluticasone 50 MCG/ACT spray    FLONASE    16 g    SPRAY 2 SPRAYS INTO BOTH NOSTRIL DAILY    Chronic pansinusitis       hydrOXYzine 25 MG tablet    ATARAX    30 tablet    Take 1-2 tablets (25-50 mg) by mouth every 6 hours as needed for itching    Anxiety       methocarbamol 750 MG tablet    ROBAXIN    20 tablet    Take 1 tablet (750 mg) by mouth 3 times daily as needed for muscle spasms    Back muscle spasm       montelukast 10 MG tablet    SINGULAIR    30 tablet    TAKE ONE TABLET BY MOUTH IN THE EVENING    Allergic rhinitis due to animals, Asthma       omeprazole 20 MG CR capsule    priLOSEC    60 capsule    Take 1 capsule (20 mg) by mouth 2 times daily    Gastroesophageal reflux disease without esophagitis       sodium chloride 0.65 % nasal spray    [employer] SALINE NASAL SPRAY    30 mL    Spray 1-2 sprays into both nostrils every 2 hours as needed for congestion (irrigation)    S/P functional endoscopic sinus surgery       tobramycin (PF) 300 MG/5ML neb solution    RAJI    280 mL    Take 5 mLs (300 mg) by nebulization 2 times daily 28 days on, 28 days off    Adult bronchiectasis (H), Bronchiectasis with acute exacerbation (H)       * Notice:  This list has 2 medication(s) that are the same as other medications prescribed for you. Read the directions carefully, and ask your doctor or other care provider to review them with you.

## 2018-06-25 ENCOUNTER — E-VISIT (OUTPATIENT)
Dept: FAMILY MEDICINE | Facility: CLINIC | Age: 33
End: 2018-06-25
Payer: COMMERCIAL

## 2018-06-25 DIAGNOSIS — J01.00 ACUTE NON-RECURRENT MAXILLARY SINUSITIS: Primary | ICD-10-CM

## 2018-06-25 PROCEDURE — 99444 ZZC PHYSICIAN ONLINE EVALUATION & MANAGEMENT SERVICE: CPT | Performed by: NURSE PRACTITIONER

## 2018-06-25 RX ORDER — LEVOFLOXACIN 500 MG/1
500 TABLET, FILM COATED ORAL DAILY
Qty: 7 TABLET | Refills: 0 | Status: SHIPPED | OUTPATIENT
Start: 2018-06-25 | End: 2018-08-14

## 2018-06-25 RX ORDER — PREDNISONE 20 MG/1
40 TABLET ORAL DAILY
Qty: 10 TABLET | Refills: 0 | Status: SHIPPED | OUTPATIENT
Start: 2018-06-25 | End: 2018-08-14

## 2018-07-11 ENCOUNTER — E-VISIT (OUTPATIENT)
Dept: OBGYN | Facility: OTHER | Age: 33
End: 2018-07-11

## 2018-07-11 ENCOUNTER — APPOINTMENT (OUTPATIENT)
Dept: URGENT CARE | Facility: URGENT CARE | Age: 33
End: 2018-07-11
Payer: COMMERCIAL

## 2018-07-11 DIAGNOSIS — N89.8 VAGINAL DISCHARGE: Primary | ICD-10-CM

## 2018-08-03 DIAGNOSIS — Z30.011 ENCOUNTER FOR INITIAL PRESCRIPTION OF CONTRACEPTIVE PILLS: ICD-10-CM

## 2018-08-03 NOTE — TELEPHONE ENCOUNTER
"Requested Prescriptions   Pending Prescriptions Disp Refills     drospirenone-ethinyl estradiol (QUAN) 3-0.02 MG per tablet 84 tablet 1     Sig: Take 1 tablet by mouth daily    Contraceptives Protocol Passed    8/3/2018  7:48 AM       Passed - Patient is not a current smoker if age is 35 or older       Passed - Recent (12 mo) or future (30 days) visit within the authorizing provider's specialty    Patient had office visit in the last 12 months or has a visit in the next 30 days with authorizing provider or within the authorizing provider's specialty.  See \"Patient Info\" tab in inbasket, or \"Choose Columns\" in Meds & Orders section of the refill encounter.           Passed - No active pregnancy on record       Passed - No positive pregnancy test in past 12 months        drospirenone-ethinyl estradiol (QUAN) 3-0.02 MG per tablet  Last Written Prescription Date:  3/28/18  Last Fill Quantity: 84,  # refills: 1   Last office visit: 6/4/2018 with prescribing provider:  Mindy Fink   Future Office Visit:      "

## 2018-08-07 RX ORDER — DROSPIRENONE AND ETHINYL ESTRADIOL 0.02-3(28)
1 KIT ORAL DAILY
Qty: 84 TABLET | Refills: 1 | Status: SHIPPED | OUTPATIENT
Start: 2018-08-07 | End: 2018-11-02

## 2018-08-09 ENCOUNTER — E-VISIT (OUTPATIENT)
Dept: FAMILY MEDICINE | Facility: CLINIC | Age: 33
End: 2018-08-09
Payer: COMMERCIAL

## 2018-08-09 DIAGNOSIS — R41.840 INATTENTION: Primary | ICD-10-CM

## 2018-08-09 PROCEDURE — 99207 ZZC NO CHARGE LOS: CPT | Performed by: NURSE PRACTITIONER

## 2018-08-14 ENCOUNTER — E-VISIT (OUTPATIENT)
Dept: FAMILY MEDICINE | Facility: CLINIC | Age: 33
End: 2018-08-14
Payer: COMMERCIAL

## 2018-08-14 DIAGNOSIS — J01.00 ACUTE NON-RECURRENT MAXILLARY SINUSITIS: ICD-10-CM

## 2018-08-14 PROCEDURE — 99444 ZZC PHYSICIAN ONLINE EVALUATION & MANAGEMENT SERVICE: CPT | Performed by: NURSE PRACTITIONER

## 2018-08-14 RX ORDER — LEVOFLOXACIN 500 MG/1
500 TABLET, FILM COATED ORAL DAILY
Qty: 7 TABLET | Refills: 0 | Status: SHIPPED | OUTPATIENT
Start: 2018-08-14 | End: 2018-10-25

## 2018-08-14 RX ORDER — PREDNISONE 20 MG/1
40 TABLET ORAL DAILY
Qty: 10 TABLET | Refills: 0 | Status: SHIPPED | OUTPATIENT
Start: 2018-08-14 | End: 2019-01-24

## 2018-08-28 DIAGNOSIS — B37.31 YEAST INFECTION OF THE VAGINA: ICD-10-CM

## 2018-08-28 RX ORDER — FLUCONAZOLE 150 MG/1
150 TABLET ORAL
Qty: 2 TABLET | Refills: 1 | Status: SHIPPED | OUTPATIENT
Start: 2018-08-28 | End: 2018-09-06

## 2018-09-12 ENCOUNTER — CARE COORDINATION (OUTPATIENT)
Dept: PULMONOLOGY | Facility: CLINIC | Age: 33
End: 2018-09-12

## 2018-09-12 DIAGNOSIS — J45.40 MODERATE PERSISTENT ASTHMA WITHOUT COMPLICATION: ICD-10-CM

## 2018-09-12 DIAGNOSIS — J47.9 ADULT BRONCHIECTASIS (H): Primary | ICD-10-CM

## 2018-09-12 RX ORDER — LEVOFLOXACIN 750 MG/1
750 TABLET, FILM COATED ORAL DAILY
Qty: 14 TABLET | Refills: 0 | Status: SHIPPED | OUTPATIENT
Start: 2018-09-12 | End: 2018-10-08

## 2018-09-12 RX ORDER — PREDNISONE 20 MG/1
40 TABLET ORAL DAILY
Qty: 10 TABLET | Refills: 0 | Status: SHIPPED | OUTPATIENT
Start: 2018-09-12 | End: 2018-09-17

## 2018-09-12 NOTE — PROGRESS NOTES
[facility] [place] [facility]  September 12, 2018    Mindy Fink    Provider: Kade Luu MD    Caller: Patient     Clinical information:  Michelle Epstein called with complaint of having a sinus infection for the past 1 1/2 weeks that has progressed to her lungs. Describes sputum as dark green/orange in color. No bleeding. No fevers. Received a 7 day course of Levaquin 500 mg and prednisone 40 mg daily x5 days about 3 weeks ago from her PCP for similar symptoms. Symptoms resolved during that course, however returned after being off for 1 1/2 weeks.    Plan:   Discussed with Dr Luu:  Levaquin 750 mg daily x14 days.  Prednisone 40 mg daily x5 days.  Call back with any new or worsening symptoms/concerns.    Caller verbalized understanding of plan and agrees with advice given.

## 2018-09-18 ENCOUNTER — OFFICE VISIT (OUTPATIENT)
Dept: PULMONOLOGY | Facility: CLINIC | Age: 33
End: 2018-09-18
Attending: INTERNAL MEDICINE
Payer: COMMERCIAL

## 2018-09-18 VITALS
BODY MASS INDEX: 29.49 KG/M2 | HEIGHT: 65 IN | SYSTOLIC BLOOD PRESSURE: 115 MMHG | DIASTOLIC BLOOD PRESSURE: 79 MMHG | HEART RATE: 102 BPM | OXYGEN SATURATION: 95 % | RESPIRATION RATE: 16 BRPM | WEIGHT: 177.03 LBS

## 2018-09-18 DIAGNOSIS — J47.9 BRONCHIECTASIS (H): Primary | ICD-10-CM

## 2018-09-18 DIAGNOSIS — J47.1 BRONCHIECTASIS WITH ACUTE EXACERBATION (H): Primary | ICD-10-CM

## 2018-09-18 LAB
EXPTIME-PRE: 8.24 SEC
FEF2575-%PRED-PRE: 81 %
FEF2575-PRE: 2.83 L/SEC
FEF2575-PRED: 3.48 L/SEC
FEFMAX-%PRED-PRE: 113 %
FEFMAX-PRE: 8.12 L/SEC
FEFMAX-PRED: 7.17 L/SEC
FEV1-%PRED-PRE: 99 %
FEV1-PRE: 3.22 L
FEV1FEV6-PRE: 78 %
FEV1FEV6-PRED: 85 %
FEV1FVC-PRE: 78 %
FEV1FVC-PRED: 84 %
FIFMAX-PRE: 3.88 L/SEC
FVC-%PRED-PRE: 106 %
FVC-PRE: 4.16 L
FVC-PRED: 3.9 L

## 2018-09-18 PROCEDURE — G0463 HOSPITAL OUTPT CLINIC VISIT: HCPCS | Mod: ZF

## 2018-09-18 ASSESSMENT — PAIN SCALES - GENERAL: PAINLEVEL: NO PAIN (0)
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
## 2018-09-18 NOTE — MR AVS SNAPSHOT
After Visit Summary   9/18/2018    Michelle Epstein    MRN: [id number]           Patient Information     Date Of Birth          1985        Visit Information        Provider Department      9/18/2018 2:20 PM Kali Banuelos MD [facility]        Today's Diagnoses     Bronchiectasis with acute exacerbation (H)    -  1       Follow-ups after your visit        Your next 10 appointments already scheduled     Nov 29, 2018 10:00 AM CST   CF LOOP with [facility] PFL CF   [facility] Pulmonary Function Testing ([facility])    [address]  3rd Floor  [facility] [address] [phone]            Nov 29, 2018 10:30 AM CST   (Arrive by 10:15 AM)   RETURN CYSTIC FIBROSIS VISIT with Kade Luu MD   [facility] ([facility])    [address]  [address] [place] [address] [phone]              Who to contact     If you have questions or need follow up information about today's clinic visit or your schedule please contact [facility] HEALTH directly at [phone].  Normal or non-critical lab and imaging results will be communicated to you by MyChart, letter or phone within 4 business days after the clinic has received the results. If you do not hear from us within 7 days, please contact the clinic through [employer] or phone. If you have a critical or abnormal lab result, we will notify you by phone as soon as possible.  Submit refill requests through [facility] or call your pharmacy and they will forward the refill request to us. Please allow 3 business days for your refill to be completed.          Additional Information About Your Visit        MyChart Information     [facility] gives you secure access to your electronic health record. If you see a primary care provider, you can also send messages to your care team and make appointments. If  "you have questions, please call your primary care clinic.  If you do not have a primary care provider, please call [phone] and they will assist you.        Care EveryWhere ID     This is your Care EveryWhere ID. This could be used by other organizations to access your [facility] medical records  [id number]        Your Vitals Were     Pulse Respirations Height Pulse Oximetry BMI (Body Mass Index)       102 16 1.645 m (5' 4.76\") 95% 29.67 kg/m2        Blood Pressure from Last 3 Encounters:   09/18/18 115/79   06/07/18 111/78   06/04/18 106/80    Weight from Last 3 Encounters:   09/18/18 80.3 kg (177 lb 0.5 oz)   06/07/18 80.5 kg (177 lb 7.5 oz)   06/04/18 81.2 kg (179 lb)              Today, you had the following     No orders found for display       Primary Care Provider Office Phone # Fax #    Mindy Aleks, RADHA [phone] [address]  [facility] [address]        Equal Access to Services     [facility]: Hadii aad ku hadasho Soomaali, waaxda luqadaha, qaybta kaalmada adeegyaadrian, kalina bergman . So [facility] [phone].    ATENCIÓN: Si habla español, tiene a fofana disposición servicios gratuitos de asistencia lingüística. [facility] [phone].    We comply with applicable federal civil rights laws and [place] laws. We do not discriminate on the basis of race, color, national origin, age, disability, sex, sexual orientation, or gender identity.            Thank you!     Thank you for choosing [facility] FOR LUNG SCIENCE [facility] HEALTH  for your care. Our goal is always to provide you with excellent care. Hearing back from our patients is one way we can continue to improve our services. Please take a few minutes to complete the written survey that you may receive in the mail after your visit with us. Thank you!             Your Updated Medication List - Protect others around you: Learn how to safely use, store and throw away your medicines at " www.disposemymeds.org.          This list is accurate as of 9/18/18  4:03 PM.  Always use your most recent med list.                   Brand Name Dispense Instructions for use Diagnosis    * albuterol 108 (90 Base) MCG/ACT inhaler    PROAIR HFA    8.5 g    Inhale 2 puffs into the lungs every 6 hours as needed for shortness of breath / dyspnea or wheezing    Bronchiectasis without acute exacerbation (H)       * albuterol (2.5 MG/3ML) 0.083% neb solution     360 mL    Take 1 vial (2.5 mg) by nebulization 4 times daily    Bronchiectasis without acute exacerbation (H)       azelastine 0.1 % nasal spray    ASTELIN    30 mL    Spray 1 spray into both nostrils 2 times daily    Bronchiectasis without acute exacerbation (H)       budesonide 1 MG/2ML Susp neb solution    PULMICORT    120 ampule    Nebulized twice daily.    Bronchiectasis without complication (H)       buPROPion 150 MG 24 hr tablet    WELLBUTRIN XL    90 tablet    Take 1 tablet (150 mg) by mouth every morning    Moderate single current episode of major depressive disorder (H)       cromolyn 20 MG/2ML neb solution    INTAL    120 mL    Take 2 mLs (20 mg) by nebulization 2 times daily    Moderate persistent asthma without complication, Bronchiectasis without complication (H)       drospirenone-ethinyl estradiol 3-0.02 MG per tablet    QUAN    84 tablet    Take 1 tablet by mouth daily    Encounter for initial prescription of contraceptive pills       ferrous sulfate 325 (65 Fe) MG tablet    IRON    100 tablet    Take 1 tablet (325 mg) by mouth daily (with breakfast)    Bronchiectasis without acute exacerbation (H)       FISH OIL + D3 PO           fluconazole 150 MG tablet    DIFLUCAN    2 tablet    Take 1 tablet (150 mg) by mouth every 72 hours as needed Until symptoms resolve.    Yeast infection of the vagina       fluticasone 50 MCG/ACT spray    FLONASE    16 g    SPRAY 2 SPRAYS INTO BOTH NOSTRIL DAILY    Chronic pansinusitis       hydrOXYzine 25 MG tablet     ATARAX    30 tablet    Take 1-2 tablets (25-50 mg) by mouth every 6 hours as needed for itching    Anxiety       * levofloxacin 500 MG tablet    LEVAQUIN    7 tablet    Take 1 tablet (500 mg) by mouth daily    Acute non-recurrent maxillary sinusitis       * levofloxacin 750 MG tablet    LEVAQUIN    14 tablet    Take 1 tablet (750 mg) by mouth daily Hold Azithromycin while taking Levaquin.    Adult bronchiectasis (H), Moderate persistent asthma without complication       methocarbamol 750 MG tablet    ROBAXIN    20 tablet    Take 1 tablet (750 mg) by mouth 3 times daily as needed for muscle spasms    Back muscle spasm       montelukast 10 MG tablet    SINGULAIR    30 tablet    TAKE ONE TABLET BY MOUTH IN THE EVENING    Allergic rhinitis due to animals, Asthma       omeprazole 20 MG CR capsule    priLOSEC    60 capsule    Take 1 capsule (20 mg) by mouth 2 times daily    Gastroesophageal reflux disease without esophagitis       predniSONE 20 MG tablet    DELTASONE    10 tablet    Take 2 tablets (40 mg) by mouth daily    Acute non-recurrent maxillary sinusitis       sodium chloride 0.65 % nasal spray    CVS SALINE NASAL SPRAY    30 mL    Spray 1-2 sprays into both nostrils every 2 hours as needed for congestion (irrigation)    S/P functional endoscopic sinus surgery       tobramycin (PF) 300 MG/5ML neb solution    RAJI    280 mL    Take 5 mLs (300 mg) by nebulization 2 times daily 28 days on, 28 days off    Adult bronchiectasis (H), Bronchiectasis with acute exacerbation (H)       * Notice:  This list has 4 medication(s) that are the same as other medications prescribed for you. Read the directions carefully, and ask your doctor or other care provider to review them with you.

## 2018-09-20 DIAGNOSIS — J47.9 BRONCHIECTASIS WITHOUT COMPLICATION (H): ICD-10-CM

## 2018-09-20 RX ORDER — BUDESONIDE 1 MG/2ML
INHALANT ORAL
Qty: 240 ML | Refills: 6 | Status: SHIPPED | OUTPATIENT
Start: 2018-09-20 | End: 2019-07-17

## 2018-10-08 ENCOUNTER — MYC REFILL (OUTPATIENT)
Dept: FAMILY MEDICINE | Facility: CLINIC | Age: 33
End: 2018-10-08

## 2018-10-08 ENCOUNTER — MYC REFILL (OUTPATIENT)
Dept: PULMONOLOGY | Facility: CLINIC | Age: 33
End: 2018-10-08

## 2018-10-08 DIAGNOSIS — Z30.011 ENCOUNTER FOR INITIAL PRESCRIPTION OF CONTRACEPTIVE PILLS: ICD-10-CM

## 2018-10-08 DIAGNOSIS — J47.9 BRONCHIECTASIS WITHOUT ACUTE EXACERBATION (H): ICD-10-CM

## 2018-10-08 RX ORDER — AZELASTINE 1 MG/ML
1 SPRAY, METERED NASAL 2 TIMES DAILY
Qty: 30 ML | Refills: 11 | Status: SHIPPED | OUTPATIENT
Start: 2018-10-08 | End: 2019-06-18

## 2018-10-08 RX ORDER — DROSPIRENONE AND ETHINYL ESTRADIOL 0.02-3(28)
1 KIT ORAL DAILY
Qty: 84 TABLET | Refills: 1 | Status: CANCELLED | OUTPATIENT
Start: 2018-10-08

## 2018-10-08 NOTE — TELEPHONE ENCOUNTER
"Requested Prescriptions   Pending Prescriptions Disp Refills     drospirenone-ethinyl estradiol (QUAN) 3-0.02 MG per tablet 84 tablet 1     Sig: Take 1 tablet by mouth daily    Contraceptives Protocol Passed    10/8/2018  2:50 PM       Passed - Patient is not a current smoker if age is 35 or older       Passed - Recent (12 mo) or future (30 days) visit within the authorizing provider's specialty    Patient had office visit in the last 12 months or has a visit in the next 30 days with authorizing provider or within the authorizing provider's specialty.  See \"Patient Info\" tab in inbasket, or \"Choose Columns\" in Meds & Orders section of the refill encounter.           Passed - No active pregnancy on record       Passed - No positive pregnancy test in past 12 months      drospirenone-ethinyl estradiol (QUAN) 3-0.02 MG per tablet  Last Written Prescription Date:  8/7/18  Last Fill Quantity: 84,  # refills: 1   Last office visit: 6/4/2018 with prescribing provider:  Mindy Fink   Future Office Visit:      "

## 2018-10-08 NOTE — TELEPHONE ENCOUNTER
Message from [employer]:  Original authorizing provider: RADHA Chatterjee would like a refill of the following medications:  drospirenone-ethinyl estradiol (QUAN) 3-0.02 MG per tablet [Mindy Fink NP]    Preferred pharmacy: [id number] [address] [place] [address]    Comment:

## 2018-10-11 DIAGNOSIS — M62.830 BACK MUSCLE SPASM: ICD-10-CM

## 2018-10-11 RX ORDER — METHOCARBAMOL 750 MG/1
TABLET, FILM COATED ORAL
Qty: 20 TABLET | Refills: 0 | Status: SHIPPED | OUTPATIENT
Start: 2018-10-11 | End: 2018-12-11

## 2018-10-25 ENCOUNTER — MYC REFILL (OUTPATIENT)
Dept: FAMILY MEDICINE | Facility: CLINIC | Age: 33
End: 2018-10-25

## 2018-10-25 DIAGNOSIS — J01.00 ACUTE NON-RECURRENT MAXILLARY SINUSITIS: ICD-10-CM

## 2018-10-25 NOTE — TELEPHONE ENCOUNTER
Message from [employer]le[employer]:  Original authorizing provider: RADHA Chatterjee would like a refill of the following medications:  levofloxacin (LEVAQUIN) 500 MG tablet [Mindy Fink NP]  predniSONE (DELTASONE) 20 MG tablet [Mindy Fink NP]    Preferred pharmacy: [id number] [address]    Comment:  Levaquin 750 still having symptoms. Could we do 1 more week of the levaquin and surgeon is okay with prednisone.

## 2018-10-25 NOTE — TELEPHONE ENCOUNTER
Requested Prescriptions   Pending Prescriptions Disp Refills     levofloxacin (LEVAQUIN) 500 MG tablet 7 tablet 0     Sig: Take 1 tablet (500 mg) by mouth daily    There is no refill protocol information for this order        predniSONE (DELTASONE) 20 MG tablet 10 tablet 0     Sig: Take 2 tablets (40 mg) by mouth daily    There is no refill protocol information for this order          levofloxacin (LEVAQUIN) 500 MG tablet      Last Written Prescription Date:  8/14/18  Last Fill Quantity: 7,   # refills: 0  Last Office Visit: 8/14/18  Future Office visit:       Routing refill request to provider for review/approval because:  Drug not on the [facility], [facility] or  [facility] refill protocol or controlled substance    predniSONE (DELTASONE) 20 MG tablet      Last Written Prescription Date:  8/14/18  Last Fill Quantity: 10,   # refills: 0  Last Office Visit: 8/14/18  Future Office visit:       Routing refill request to provider for review/approval because:  Drug not on the [facility], P or  [facility] refill protocol or controlled substance

## 2018-10-26 RX ORDER — LEVOFLOXACIN 500 MG/1
500 TABLET, FILM COATED ORAL DAILY
Qty: 7 TABLET | Refills: 0 | Status: SHIPPED | OUTPATIENT
Start: 2018-10-26 | End: 2018-12-10

## 2018-10-26 RX ORDER — PREDNISONE 20 MG/1
40 TABLET ORAL DAILY
Qty: 10 TABLET | Refills: 0 | OUTPATIENT
Start: 2018-10-26

## 2018-10-26 NOTE — TELEPHONE ENCOUNTER
Routing refill request to provider for review/approval because:  Drug not on the [facility] refill protocol - please see patient's message below.   Maria Isabel Garcia RN

## 2018-10-26 NOTE — TELEPHONE ENCOUNTER
Will fill the antibiotic just once for 7 days.  If she needs any further antibiotics she needs an appointment.  Remember to take the antibiotic more than 2 hours apart from her multivitamins.  Also stop the azithromycin while on the Levaquin if she is still taking that.  Will not fill the steroid.  It appears that the pulmonologist did not want to do that due to inhibiting the healing of her abdominal incision.  Will defer filling of the steroid to the pulmonologist.  MIKE Chatterjee, NP-C  [facility]

## 2018-10-29 DIAGNOSIS — Z30.011 ENCOUNTER FOR INITIAL PRESCRIPTION OF CONTRACEPTIVE PILLS: ICD-10-CM

## 2018-10-29 NOTE — TELEPHONE ENCOUNTER
[employer] message sent to patient with provider message below. Advised patient to contact clinic if there are any further questions.     Imani Beach RN

## 2018-10-29 NOTE — TELEPHONE ENCOUNTER
"Requested Prescriptions   Pending Prescriptions Disp Refills     drospirenone-ethinyl estradiol (QUAN) 3-0.02 MG per tablet 84 tablet 1     Sig: Take 1 tablet by mouth daily    Contraceptives Protocol Passed    10/29/2018  6:38 PM       Passed - Patient is not a current smoker if age is 35 or older       Passed - Recent (12 mo) or future (30 days) visit within the authorizing provider's specialty    Patient had office visit in the last 12 months or has a visit in the next 30 days with authorizing provider or within the authorizing provider's specialty.  See \"Patient Info\" tab in inbasket, or \"Choose Columns\" in Meds & Orders section of the refill encounter.             Passed - No active pregnancy on record       Passed - No positive pregnancy test in past 12 months        drospirenone-ethinyl estradiol (QUAN) 3-0.02 MG per tablet  Last Written Prescription Date:  8/7/18  Last Fill Quantity: 84,  # refills: 1   Last office visit: 6/4/2018 with prescribing provider:  Mindy Fink   Future Office Visit:      "

## 2018-10-31 RX ORDER — DROSPIRENONE AND ETHINYL ESTRADIOL 0.02-3(28)
1 KIT ORAL DAILY
Qty: 84 TABLET | Refills: 1
Start: 2018-10-31

## 2018-10-31 NOTE — TELEPHONE ENCOUNTER
84 day supply with 1 refills sent on 8/7/18. Should have refill on file at pharmacy. Too soon to refill.         Manny Lraa RN, BSN

## 2018-11-02 ENCOUNTER — MYC REFILL (OUTPATIENT)
Dept: FAMILY MEDICINE | Facility: CLINIC | Age: 33
End: 2018-11-02

## 2018-11-02 DIAGNOSIS — Z30.011 ENCOUNTER FOR INITIAL PRESCRIPTION OF CONTRACEPTIVE PILLS: ICD-10-CM

## 2018-11-02 RX ORDER — DROSPIRENONE AND ETHINYL ESTRADIOL 0.02-3(28)
1 KIT ORAL DAILY
Qty: 84 TABLET | Refills: 1 | Status: CANCELLED | OUTPATIENT
Start: 2018-11-02

## 2018-11-02 RX ORDER — DROSPIRENONE AND ETHINYL ESTRADIOL 0.02-3(28)
1 KIT ORAL DAILY
Qty: 84 TABLET | Refills: 0 | Status: SHIPPED | OUTPATIENT
Start: 2018-11-02 | End: 2019-01-07

## 2018-11-02 NOTE — TELEPHONE ENCOUNTER
Patient reports pharmacy does not have refill.   Remaining refill resent to patient's pharmacy.   Maria Isabel Garcia RN

## 2018-11-02 NOTE — TELEPHONE ENCOUNTER
"Requested Prescriptions   Pending Prescriptions Disp Refills     drospirenone-ethinyl estradiol (QUAN) 3-0.02 MG per tablet    Last Written Prescription Date:  08/07/18  Last Fill Quantity: 84 tablet,  # refills: 1   Last office visit: 6/4/2018 with prescribing provider:  Mindy Fink NP   Future Office Visit:     84 tablet 1     Sig: Take 1 tablet by mouth daily    Contraceptives Protocol Passed    11/2/2018  1:07 PM       Passed - Patient is not a current smoker if age is 35 or older       Passed - Recent (12 mo) or future (30 days) visit within the authorizing provider's specialty    Patient had office visit in the last 12 months or has a visit in the next 30 days with authorizing provider or within the authorizing provider's specialty.  See \"Patient Info\" tab in inbasket, or \"Choose Columns\" in Meds & Orders section of the refill encounter.             Passed - No active pregnancy on record       Passed - No positive pregnancy test in past 12 months          "

## 2018-11-02 NOTE — TELEPHONE ENCOUNTER
Message from [employer]:  Original authorizing provider: RADHA Chatterjee would like a refill of the following medications:  drospirenone-ethinyl estradiol (QUAN) 3-0.02 MG per tablet [Mindy Fink NP]    Preferred pharmacy: [facility] #[id number] [address]    Comment:  No refill at [facility]. They sent a request to

## 2018-11-02 NOTE — TELEPHONE ENCOUNTER
Message from [employer]:  Original authorizing provider: RADHA Chatterjee would like a refill of the following medications:  drospirenone-ethinyl estradiol (QUAN) 3-0.02 MG per tablet [Mindy Fink NP]    Preferred pharmacy: [facility] #[id number] [facility] [address] [place] [address]    Comment:

## 2018-11-04 ENCOUNTER — E-VISIT (OUTPATIENT)
Dept: FAMILY MEDICINE | Facility: CLINIC | Age: 33
End: 2018-11-04
Payer: COMMERCIAL

## 2018-11-04 DIAGNOSIS — F32.1 MODERATE SINGLE CURRENT EPISODE OF MAJOR DEPRESSIVE DISORDER (H): Primary | ICD-10-CM

## 2018-11-04 PROCEDURE — 99444 ZZC PHYSICIAN ONLINE EVALUATION & MANAGEMENT SERVICE: CPT | Performed by: NURSE PRACTITIONER

## 2018-11-04 ASSESSMENT — ANXIETY QUESTIONNAIRES
5. BEING SO RESTLESS THAT IT IS HARD TO SIT STILL: NOT AT ALL
3. WORRYING TOO MUCH ABOUT DIFFERENT THINGS: SEVERAL DAYS
GAD7 TOTAL SCORE: 3
7. FEELING AFRAID AS IF SOMETHING AWFUL MIGHT HAPPEN: SEVERAL DAYS
GAD7 TOTAL SCORE: 3
GAD7 TOTAL SCORE: 3
2. NOT BEING ABLE TO STOP OR CONTROL WORRYING: SEVERAL DAYS
7. FEELING AFRAID AS IF SOMETHING AWFUL MIGHT HAPPEN: SEVERAL DAYS
6. BECOMING EASILY ANNOYED OR IRRITABLE: NOT AT ALL
4. TROUBLE RELAXING: NOT AT ALL
1. FEELING NERVOUS, ANXIOUS, OR ON EDGE: NOT AT ALL

## 2018-11-04 ASSESSMENT — PATIENT HEALTH QUESTIONNAIRE - PHQ9
10. IF YOU CHECKED OFF ANY PROBLEMS, HOW DIFFICULT HAVE THESE PROBLEMS MADE IT FOR YOU TO DO YOUR WORK, TAKE CARE OF THINGS AT HOME, OR GET ALONG WITH OTHER PEOPLE: SOMEWHAT DIFFICULT
SUM OF ALL RESPONSES TO PHQ QUESTIONS 1-9: 6
SUM OF ALL RESPONSES TO PHQ QUESTIONS 1-9: 6

## 2018-11-05 RX ORDER — BUPROPION HYDROCHLORIDE 75 MG/1
TABLET ORAL
Qty: 30 TABLET | Refills: 0 | Status: SHIPPED | OUTPATIENT
Start: 2018-11-05 | End: 2018-12-04

## 2018-11-05 ASSESSMENT — PATIENT HEALTH QUESTIONNAIRE - PHQ9: SUM OF ALL RESPONSES TO PHQ QUESTIONS 1-9: 6

## 2018-11-05 ASSESSMENT — ANXIETY QUESTIONNAIRES: GAD7 TOTAL SCORE: 3

## 2018-11-12 DIAGNOSIS — E84.0 CYSTIC FIBROSIS WITH PULMONARY MANIFESTATIONS (H): Primary | ICD-10-CM

## 2018-11-12 DIAGNOSIS — J47.9 BRONCHIECTASIS WITHOUT ACUTE EXACERBATION (H): ICD-10-CM

## 2018-11-12 RX ORDER — FLUOCINOLONE ACETONIDE 0.11 MG/ML
OIL AURICULAR (OTIC)
Qty: 20 ML | Refills: 0 | Status: SHIPPED | OUTPATIENT
Start: 2018-11-12 | End: 2019-01-05

## 2018-11-26 ENCOUNTER — TELEPHONE (OUTPATIENT)
Dept: PULMONOLOGY | Facility: CLINIC | Age: 33
End: 2018-11-26

## 2018-11-26 NOTE — TELEPHONE ENCOUNTER
PA Initiation    Medication: fluocinolone acetonide 0.01 % OIL (PENDING)  Insurance Company: [employer] - Phone [phone] Fax [phone]  Pharmacy Filling the Rx: [facility][id number] - [place] - [address] [facility]  Filling Pharmacy Phone:    Filling Pharmacy Fax:    Start Date: 11/26/2018

## 2018-11-26 NOTE — TELEPHONE ENCOUNTER
PRIOR AUTHORIZATION DENIED    Medication: fluocinolone acetonide 0.01 % OIL (PENDING) - denied    Denial Date: 11/26/2018    Denial Rational: script is denied because pt needs to try/fail dexamethasone or have a contraindication to the formulary alternative ( document denial once received )          Appeal Information: document once received

## 2018-11-28 DIAGNOSIS — K21.9 GASTROESOPHAGEAL REFLUX DISEASE WITHOUT ESOPHAGITIS: ICD-10-CM

## 2018-12-04 DIAGNOSIS — J47.9 BRONCHIECTASIS WITHOUT ACUTE EXACERBATION (H): ICD-10-CM

## 2018-12-04 DIAGNOSIS — F32.1 MODERATE SINGLE CURRENT EPISODE OF MAJOR DEPRESSIVE DISORDER (H): ICD-10-CM

## 2018-12-04 RX ORDER — ALBUTEROL SULFATE 0.83 MG/ML
SOLUTION RESPIRATORY (INHALATION)
Qty: 360 ML | Refills: 12 | Status: SHIPPED | OUTPATIENT
Start: 2018-12-04 | End: 2019-09-10

## 2018-12-04 RX ORDER — BUPROPION HYDROCHLORIDE 75 MG/1
TABLET ORAL
Qty: 90 TABLET | Refills: 0 | Status: SHIPPED | OUTPATIENT
Start: 2018-12-04 | End: 2019-01-24

## 2018-12-04 NOTE — TELEPHONE ENCOUNTER
"Requested Prescriptions   Pending Prescriptions Disp Refills     buPROPion (WELLBUTRIN) 75 MG tablet  Last Written Prescription Date:  11/05/18  Last Fill Quantity: 30 tablet,  # refills: 0   Last office visit: 6/4/2018 with prescribing provider:  Mindy Fink NP  Future Office Visit:     30 tablet 0     Sig: Take 75 mg tab with 150 mg tab to = 225 mg daily    SSRIs Protocol Failed    12/4/2018 12:53 PM       Failed - PHQ-9 score less than 5 in past 6 months    Please review last PHQ-9 score.   PHQ-9 SCORE 5/17/2018 6/4/2018 11/4/2018   PHQ-9 Total Score - - -   PHQ-9 Total Score MyChart 3 (Minimal depression) - 6 (Mild depression)   PHQ-9 Total Score 3 4 6     FELIPA-7 SCORE 5/17/2018 6/4/2018 11/4/2018   Total Score 3 (minimal anxiety) - 3 (minimal anxiety)   Total Score 3 5 3                Passed - Medication is Bupropion    If the medication is Bupropion (Wellbutrin), and the patient is taking for smoking cessation; OK to refill.         Passed - Patient is age 18 or older       Passed - No active pregnancy on record       Passed - No positive pregnancy test in last 12 months       Passed - Recent (6 mo) or future (30 days) visit within the authorizing provider's specialty    Patient had office visit in the last 6 months or has a visit in the next 30 days with authorizing provider or within the authorizing provider's specialty.  See \"Patient Info\" tab in inbasket, or \"Choose Columns\" in Meds & Orders section of the refill encounter.              "

## 2018-12-10 ENCOUNTER — TELEPHONE (OUTPATIENT)
Dept: FAMILY MEDICINE | Facility: CLINIC | Age: 33
End: 2018-12-10

## 2018-12-10 ENCOUNTER — E-VISIT (OUTPATIENT)
Dept: FAMILY MEDICINE | Facility: CLINIC | Age: 33
End: 2018-12-10
Payer: COMMERCIAL

## 2018-12-10 DIAGNOSIS — J01.90 ACUTE SINUSITIS WITH SYMPTOMS > 10 DAYS: Primary | ICD-10-CM

## 2018-12-10 DIAGNOSIS — J01.00 ACUTE NON-RECURRENT MAXILLARY SINUSITIS: ICD-10-CM

## 2018-12-10 PROCEDURE — 99444 ZZC PHYSICIAN ONLINE EVALUATION & MANAGEMENT SERVICE: CPT | Performed by: NURSE PRACTITIONER

## 2018-12-10 RX ORDER — LEVOFLOXACIN 500 MG/1
500 TABLET, FILM COATED ORAL DAILY
Qty: 7 TABLET | Refills: 0 | Status: SHIPPED | OUTPATIENT
Start: 2018-12-10 | End: 2019-01-24

## 2018-12-10 RX ORDER — PREDNISONE 20 MG/1
20 TABLET ORAL DAILY
Qty: 5 TABLET | Refills: 0 | Status: SHIPPED | OUTPATIENT
Start: 2018-12-10 | End: 2019-03-27

## 2018-12-10 NOTE — TELEPHONE ENCOUNTER
Called and spoke with pharmacist at [facility] and verified provider direction below. They state understanding and will fill as updated.     Imani Beach RN

## 2018-12-10 NOTE — TELEPHONE ENCOUNTER
Routing to provider to please clarify dose of Prednisone prescribed today. Sig is for take daily x 7 days, but only 5 tab ordered.     Disp Refills Start End ANGEL   predniSONE (DELTASONE) 20 MG tablet 5 tablet 0 12/10/2018 12/17/2018 --   Sig - Route: Take 20 mg by mouth daily for 7 days. - Oral   Sent to pharmacy as: predniSONE (DELTASONE) 20 MG tablet   Class: E-Prescribe     Lisa West RN  [facility]

## 2018-12-10 NOTE — PATIENT INSTRUCTIONS
Thank you for choosing us for your care. I have placed an order for a prescription so that you can start treatment. View your full visit summary for details by clicking on the link below. Your pharmacist will able to address any questions you may have about the medication.     If you're not feeling better within 5-7 days, please schedule an appointment.  You can schedule an appointment right here in [facility] [employer], or call [phone]  If the visit is for the same symptoms as your e-visit, we'll refund the cost of your e-visit if seen within seven days.      Sinusitis (Antibiotic Treatment)    The sinuses are air-filled spaces within the bones of the face. They connect to the inside of the nose. Sinusitis is an inflammation of the tissue that lines the sinuses. Sinusitis can occur during a cold. It can also happen due to allergies to pollens and other particles in the air. Sinusitis can cause symptoms of sinus congestion and a feeling of fullness. A sinus infection causes fever, headache, and facial pain. There is often green or yellow fluid draining from the nose or into the back of the throat (post-nasal drip). You have been given antibiotics to treat this condition.  Home care    Take the full course of antibiotics as instructed. Do not stop taking them, even when you feel better.    Drink plenty of water, hot tea, and other liquids. This may help thin nasal mucus. It also may help your sinuses drain fluids.    Heat may help soothe painful areas of your face. Use a towel soaked in hot water. Or,  the shower and direct the warm spray onto your face. Using a vaporizer along with a menthol rub at night may also help soothe symptoms.     An expectorant with guaifenesin may help thin nasal mucus and help your sinuses drain fluids.    You can use an over-the-counter decongestant, unless a similar medicine was prescribed to you. Nasal sprays work the fastest. Use one that contains phenylephrine or oxymetazoline.  First blow your nose gently. Then use the spray. Do not use these medicines more often than directed on the label. If you do, your symptoms may get worse. You may also take pills that contain pseudoephedrine. Don t use products that combine multiple medicines. This is because side effects may be increased. Read labels. You can also ask the pharmacist for help. (People with high blood pressure should not use decongestants. They can raise blood pressure.)    Over-the-counter antihistamines may help if allergies contributed to your sinusitis.      Do not use nasal rinses or irrigation during an acute sinus infection, unless your healthcare provider tells you to. Rinsing may spread the infection to other areas in your sinuses.    Use acetaminophen or ibuprofen to control pain, unless another pain medicine was prescribed to you. If you have chronic liver or kidney disease or ever had a stomach ulcer, talk with your healthcare provider before using these medicines. (Aspirin should never be taken by anyone under age 18 who is ill with a fever. It may cause severe liver damage.)    Don't smoke. This can make symptoms worse.  Follow-up care  Follow up with your healthcare provider or our staff if you are better in 1 week.  When to seek medical advice  Call your healthcare provider if any of these occur:    Facial pain or headache that gets worse    Stiff neck    Unusual drowsiness or confusion    Swelling of your forehead or eyelids    Vision problems, such as blurred or double vision    Fever of 100.4 F (38 C) or higher, or as directed by your healthcare provider    Seizure    Breathing problems    Symptoms don't go away in 10 days  Prevention  Here are steps you can take to help prevent an infection:    Keep good hand washing habits.    Don t have close contact with people who have sore throats, colds, or other upper respiratory infections.    Don t smoke, and stay away from secondhand smoke.    Stay up to date with of  your vaccines.  Date Last Reviewed: 11/1/2017 2000-2018 [employer] [address] [place] [address], [place] [address]. All rights reserved. This information is not intended as a substitute for professional medical care. Always follow your healthcare professional's instructions.

## 2018-12-10 NOTE — TELEPHONE ENCOUNTER
Reason for Call:  Other prescription    Detailed comments: [facility] calling to get clarification on directions for Prednisone.     Phone Number Pharmacy  can be reached at: Other phone number:  [phone]    Best Time: anytime    Can we leave a detailed message on this number? YES    Call taken on 12/10/2018 at 11:38 AM by Gustavo Justice

## 2018-12-11 ENCOUNTER — MYC REFILL (OUTPATIENT)
Dept: PULMONOLOGY | Facility: CLINIC | Age: 33
End: 2018-12-11

## 2018-12-11 DIAGNOSIS — B37.31 YEAST INFECTION OF THE VAGINA: ICD-10-CM

## 2018-12-11 DIAGNOSIS — M62.830 BACK MUSCLE SPASM: ICD-10-CM

## 2018-12-12 PROBLEM — F41.9 ANXIETY: Status: ACTIVE | Noted: 2018-12-12

## 2018-12-12 RX ORDER — FLUCONAZOLE 150 MG/1
150 TABLET ORAL
Qty: 2 TABLET | Refills: 1 | Status: SHIPPED | OUTPATIENT
Start: 2018-12-12 | End: 2019-05-30

## 2018-12-12 RX ORDER — METHOCARBAMOL 750 MG/1
750 TABLET, FILM COATED ORAL 3 TIMES DAILY
Qty: 20 TABLET | Refills: 0 | Status: SHIPPED | OUTPATIENT
Start: 2018-12-12 | End: 2019-01-24

## 2018-12-31 RX ORDER — LEVOFLOXACIN 750 MG/1
750 TABLET, FILM COATED ORAL DAILY
Qty: 14 TABLET | Refills: 0 | COMMUNITY
Start: 2018-12-31 | End: 2019-01-24

## 2018-12-31 RX ORDER — PREDNISONE 20 MG/1
TABLET ORAL
Qty: 10 TABLET | Refills: 0 | COMMUNITY
Start: 2018-12-31 | End: 2019-01-24

## 2019-01-03 DIAGNOSIS — K21.9 GASTROESOPHAGEAL REFLUX DISEASE WITHOUT ESOPHAGITIS: ICD-10-CM

## 2019-01-03 DIAGNOSIS — Z30.011 ENCOUNTER FOR INITIAL PRESCRIPTION OF CONTRACEPTIVE PILLS: ICD-10-CM

## 2019-01-03 NOTE — TELEPHONE ENCOUNTER
"Requested Prescriptions   Pending Prescriptions Disp Refills     drospirenone-ethinyl estradiol (QUAN) 3-0.02 MG tablet 84 tablet 0     Sig: Take 1 tablet by mouth daily    Contraceptives Protocol Passed - 1/3/2019 10:12 AM       Passed - Patient is not a current smoker if age is 35 or older       Passed - Recent (12 mo) or future (30 days) visit within the authorizing provider's specialty    Patient had office visit in the last 12 months or has a visit in the next 30 days with authorizing provider or within the authorizing provider's specialty.  See \"Patient Info\" tab in inbasket, or \"Choose Columns\" in Meds & Orders section of the refill encounter.             Passed - No active pregnancy on record       Passed - No positive pregnancy test in past 12 months        drospirenone-ethinyl estradiol (QUAN) 3-0.02 MG per tablet  Last Written Prescription Date:  11/2/18  Last Fill Quantity: 84,  # refills: 0   Last office visit: 6/4/2018 with prescribing provider:  Mindy Fink   Future Office Visit:      "

## 2019-01-05 ENCOUNTER — MYC REFILL (OUTPATIENT)
Dept: PULMONOLOGY | Facility: CLINIC | Age: 34
End: 2019-01-05

## 2019-01-05 ENCOUNTER — MYC REFILL (OUTPATIENT)
Dept: FAMILY MEDICINE | Facility: CLINIC | Age: 34
End: 2019-01-05

## 2019-01-05 DIAGNOSIS — Z30.011 ENCOUNTER FOR INITIAL PRESCRIPTION OF CONTRACEPTIVE PILLS: ICD-10-CM

## 2019-01-05 DIAGNOSIS — J47.9 BRONCHIECTASIS WITHOUT ACUTE EXACERBATION (H): ICD-10-CM

## 2019-01-07 ENCOUNTER — MYC REFILL (OUTPATIENT)
Dept: FAMILY MEDICINE | Facility: CLINIC | Age: 34
End: 2019-01-07

## 2019-01-07 DIAGNOSIS — Z30.011 ENCOUNTER FOR INITIAL PRESCRIPTION OF CONTRACEPTIVE PILLS: ICD-10-CM

## 2019-01-07 DIAGNOSIS — F32.1 MODERATE SINGLE CURRENT EPISODE OF MAJOR DEPRESSIVE DISORDER (H): ICD-10-CM

## 2019-01-07 RX ORDER — DROSPIRENONE AND ETHINYL ESTRADIOL 0.02-3(28)
1 KIT ORAL DAILY
Qty: 84 TABLET | Refills: 0 | OUTPATIENT
Start: 2019-01-07

## 2019-01-07 RX ORDER — DROSPIRENONE AND ETHINYL ESTRADIOL 0.02-3(28)
1 KIT ORAL DAILY
Qty: 84 TABLET | Refills: 4 | Status: SHIPPED | OUTPATIENT
Start: 2019-01-07 | End: 2020-02-03

## 2019-01-07 NOTE — TELEPHONE ENCOUNTER
"Requested Prescriptions   Pending Prescriptions Disp Refills     hydrOXYzine (ATARAX) 25 MG tablet 30 tablet 1     Sig: Take 1-2 tablets (25-50 mg) by mouth every 6 hours as needed for itching    Antihistamines Protocol Passed - 1/7/2019  2:46 PM       Passed - Recent (12 mo) or future (30 days) visit within the authorizing provider's specialty    Patient had office visit in the last 12 months or has a visit in the next 30 days with authorizing provider or within the authorizing provider's specialty.  See \"Patient Info\" tab in inbasket, or \"Choose Columns\" in Meds & Orders section of the refill encounter.             Passed - Patient is age 3 or older    Apply age and/or weight-based dosing for peds patients age 3 and older.    Forward request to provider for patients under the age of 3.         Passed - Medication is active on med list          hydrOXYzine (ATARAX) 25 MG tablet  Last Written Prescription Date:  7/5/18  Last Fill Quantity: 30,  # refills: 1   Last office visit: 6/4/2018 with prescribing provider:  Mindy Fink   Future Office Visit:      "

## 2019-01-07 NOTE — TELEPHONE ENCOUNTER
Prescription approved per [facility] Refill Protocol.      Manny Lara RN, BSN

## 2019-01-08 RX ORDER — FLUOCINOLONE ACETONIDE 0.11 MG/ML
5 OIL AURICULAR (OTIC) 2 TIMES DAILY
Qty: 20 ML | Refills: 0 | Status: SHIPPED | OUTPATIENT
Start: 2019-01-08 | End: 2019-05-29

## 2019-01-10 RX ORDER — HYDROXYZINE HYDROCHLORIDE 25 MG/1
25-50 TABLET, FILM COATED ORAL EVERY 6 HOURS PRN
Qty: 30 TABLET | Refills: 1 | Status: SHIPPED | OUTPATIENT
Start: 2019-01-10 | End: 2019-12-11

## 2019-01-21 DIAGNOSIS — F32.1 MODERATE SINGLE CURRENT EPISODE OF MAJOR DEPRESSIVE DISORDER (H): ICD-10-CM

## 2019-01-21 DIAGNOSIS — M62.830 BACK MUSCLE SPASM: ICD-10-CM

## 2019-01-21 RX ORDER — BUPROPION HYDROCHLORIDE 150 MG/1
150 TABLET ORAL EVERY MORNING
Qty: 90 TABLET | Refills: 1 | Status: CANCELLED | OUTPATIENT
Start: 2019-01-21

## 2019-01-21 NOTE — TELEPHONE ENCOUNTER
"Requested Prescriptions   Pending Prescriptions Disp Refills     buPROPion (WELLBUTRIN XL) 150 MG 24 hr tablet 90 tablet 1     Sig: Take 1 tablet (150 mg) by mouth every morning    SSRIs Protocol Failed - 1/21/2019  1:42 PM       Failed - PHQ-9 score less than 5 in past 6 months    Please review last PHQ-9 score.          Failed - Recent (6 mo) or future (30 days) visit within the authorizing provider's specialty    Patient had office visit in the last 6 months or has a visit in the next 30 days with authorizing provider or within the authorizing provider's specialty.  See \"Patient Info\" tab in inbasket, or \"Choose Columns\" in Meds & Orders section of the refill encounter.           Passed - Medication is Bupropion    If the medication is Bupropion (Wellbutrin), and the patient is taking for smoking cessation; OK to refill.         Passed - Medication is active on med list       Passed - Patient is age 18 or older       Passed - No active pregnancy on record       Passed - No positive pregnancy test in last 12 months        buPROPion (WELLBUTRIN XL) 150 MG 24 hr tablet  Last Written Prescription Date:  6/4/18  Last Fill Quantity: 90,  # refills: 0   Last office visit: 6/4/2018 with prescribing provider:  Mindy Fink   Future Office Visit:      PHQ-9 score:    PHQ-9 SCORE 11/4/2018   PHQ-9 Total Score -   PHQ-9 Total Score MyChart 6 (Mild depression)   PHQ-9 Total Score 6       FELIPA-7 SCORE 5/17/2018 6/4/2018 11/4/2018   Total Score 3 (minimal anxiety) - 3 (minimal anxiety)   Total Score 3 5 3               "

## 2019-01-23 ENCOUNTER — MYC REFILL (OUTPATIENT)
Dept: FAMILY MEDICINE | Facility: CLINIC | Age: 34
End: 2019-01-23

## 2019-01-23 DIAGNOSIS — F32.1 MODERATE SINGLE CURRENT EPISODE OF MAJOR DEPRESSIVE DISORDER (H): ICD-10-CM

## 2019-01-23 RX ORDER — BUPROPION HYDROCHLORIDE 150 MG/1
150 TABLET ORAL EVERY MORNING
Qty: 90 TABLET | Refills: 1 | Status: CANCELLED | OUTPATIENT
Start: 2019-01-23

## 2019-01-24 ENCOUNTER — E-VISIT (OUTPATIENT)
Dept: FAMILY MEDICINE | Facility: CLINIC | Age: 34
End: 2019-01-24
Payer: COMMERCIAL

## 2019-01-24 ENCOUNTER — OFFICE VISIT (OUTPATIENT)
Dept: PULMONOLOGY | Facility: CLINIC | Age: 34
End: 2019-01-24
Attending: INTERNAL MEDICINE
Payer: COMMERCIAL

## 2019-01-24 ENCOUNTER — TELEPHONE (OUTPATIENT)
Dept: PULMONOLOGY | Facility: CLINIC | Age: 34
End: 2019-01-24

## 2019-01-24 VITALS
SYSTOLIC BLOOD PRESSURE: 123 MMHG | BODY MASS INDEX: 29.38 KG/M2 | RESPIRATION RATE: 16 BRPM | HEIGHT: 65 IN | WEIGHT: 176.37 LBS | DIASTOLIC BLOOD PRESSURE: 80 MMHG | OXYGEN SATURATION: 97 % | HEART RATE: 111 BPM

## 2019-01-24 DIAGNOSIS — F32.1 MODERATE SINGLE CURRENT EPISODE OF MAJOR DEPRESSIVE DISORDER (H): ICD-10-CM

## 2019-01-24 DIAGNOSIS — M62.830 BACK MUSCLE SPASM: ICD-10-CM

## 2019-01-24 DIAGNOSIS — J47.1 BRONCHIECTASIS WITH ACUTE EXACERBATION (H): Primary | ICD-10-CM

## 2019-01-24 DIAGNOSIS — F41.9 ANXIETY: Primary | ICD-10-CM

## 2019-01-24 DIAGNOSIS — J47.9 BRONCHIECTASIS WITHOUT COMPLICATION (H): Primary | ICD-10-CM

## 2019-01-24 DIAGNOSIS — J47.9 BRONCHIECTASIS WITHOUT COMPLICATION (H): ICD-10-CM

## 2019-01-24 DIAGNOSIS — F41.9 ANXIETY: ICD-10-CM

## 2019-01-24 LAB
ANION GAP SERPL CALCULATED.3IONS-SCNC: 8 MMOL/L (ref 3–14)
BASOPHILS # BLD AUTO: 0 10E9/L (ref 0–0.2)
BASOPHILS NFR BLD AUTO: 0.1 %
BUN SERPL-MCNC: 14 MG/DL (ref 7–30)
CALCIUM SERPL-MCNC: 8.8 MG/DL (ref 8.5–10.1)
CHLORIDE SERPL-SCNC: 107 MMOL/L (ref 94–109)
CO2 SERPL-SCNC: 25 MMOL/L (ref 20–32)
CREAT SERPL-MCNC: 0.94 MG/DL (ref 0.52–1.04)
DIFFERENTIAL METHOD BLD: NORMAL
EOSINOPHIL # BLD AUTO: 0.2 10E9/L (ref 0–0.7)
EOSINOPHIL NFR BLD AUTO: 2.8 %
ERYTHROCYTE [DISTWIDTH] IN BLOOD BY AUTOMATED COUNT: 13.9 % (ref 10–15)
GFR SERPL CREATININE-BSD FRML MDRD: 78 ML/MIN/{1.73_M2}
GLUCOSE SERPL-MCNC: 102 MG/DL (ref 70–99)
HCT VFR BLD AUTO: 44.4 % (ref 35–47)
HGB BLD-MCNC: 14.3 G/DL (ref 11.7–15.7)
IMM GRANULOCYTES # BLD: 0 10E9/L (ref 0–0.4)
IMM GRANULOCYTES NFR BLD: 0.1 %
LYMPHOCYTES # BLD AUTO: 3 10E9/L (ref 0.8–5.3)
LYMPHOCYTES NFR BLD AUTO: 39.4 %
MCH RBC QN AUTO: 28.7 PG (ref 26.5–33)
MCHC RBC AUTO-ENTMCNC: 32.2 G/DL (ref 31.5–36.5)
MCV RBC AUTO: 89 FL (ref 78–100)
MONOCYTES # BLD AUTO: 0.4 10E9/L (ref 0–1.3)
MONOCYTES NFR BLD AUTO: 5.3 %
NEUTROPHILS # BLD AUTO: 3.9 10E9/L (ref 1.6–8.3)
NEUTROPHILS NFR BLD AUTO: 52.3 %
NRBC # BLD AUTO: 0 10*3/UL
NRBC BLD AUTO-RTO: 0 /100
PLATELET # BLD AUTO: 261 10E9/L (ref 150–450)
POTASSIUM SERPL-SCNC: 4.1 MMOL/L (ref 3.4–5.3)
RBC # BLD AUTO: 4.98 10E12/L (ref 3.8–5.2)
SODIUM SERPL-SCNC: 140 MMOL/L (ref 133–144)
WBC # BLD AUTO: 7.5 10E9/L (ref 4–11)

## 2019-01-24 PROCEDURE — 99444 ZZC PHYSICIAN ONLINE EVALUATION & MANAGEMENT SERVICE: CPT | Performed by: NURSE PRACTITIONER

## 2019-01-24 PROCEDURE — 82785 ASSAY OF IGE: CPT | Performed by: INTERNAL MEDICINE

## 2019-01-24 PROCEDURE — 85025 COMPLETE CBC W/AUTO DIFF WBC: CPT | Performed by: INTERNAL MEDICINE

## 2019-01-24 PROCEDURE — G0463 HOSPITAL OUTPT CLINIC VISIT: HCPCS | Mod: ZF

## 2019-01-24 PROCEDURE — 36415 COLL VENOUS BLD VENIPUNCTURE: CPT | Performed by: INTERNAL MEDICINE

## 2019-01-24 PROCEDURE — 80048 BASIC METABOLIC PNL TOTAL CA: CPT | Performed by: INTERNAL MEDICINE

## 2019-01-24 RX ORDER — BUPROPION HYDROCHLORIDE 100 MG/1
TABLET, EXTENDED RELEASE ORAL
Qty: 90 TABLET | Refills: 0 | Status: SHIPPED | OUTPATIENT
Start: 2019-01-24 | End: 2019-05-17

## 2019-01-24 RX ORDER — BUPROPION HYDROCHLORIDE 150 MG/1
TABLET ORAL
Qty: 90 TABLET | Refills: 1 | Status: SHIPPED | OUTPATIENT
Start: 2019-01-24 | End: 2019-05-17

## 2019-01-24 RX ORDER — METHOCARBAMOL 750 MG/1
TABLET, FILM COATED ORAL
Qty: 20 TABLET | Refills: 0 | OUTPATIENT
Start: 2019-01-24

## 2019-01-24 RX ORDER — AZITHROMYCIN 250 MG/1
250 TABLET, FILM COATED ORAL DAILY
Qty: 30 TABLET | Refills: 11 | Status: SHIPPED | OUTPATIENT
Start: 2019-01-24 | End: 2020-05-21

## 2019-01-24 RX ORDER — METHOCARBAMOL 750 MG/1
750 TABLET, FILM COATED ORAL 3 TIMES DAILY
Qty: 20 TABLET | Refills: 0 | Status: SHIPPED | OUTPATIENT
Start: 2019-01-24 | End: 2019-09-08

## 2019-01-24 ASSESSMENT — MIFFLIN-ST. JEOR: SCORE: 1497.13

## 2019-01-24 ASSESSMENT — ANXIETY QUESTIONNAIRES
GAD7 TOTAL SCORE: 3
5. BEING SO RESTLESS THAT IT IS HARD TO SIT STILL: NOT AT ALL
3. WORRYING TOO MUCH ABOUT DIFFERENT THINGS: SEVERAL DAYS
7. FEELING AFRAID AS IF SOMETHING AWFUL MIGHT HAPPEN: SEVERAL DAYS
GAD7 TOTAL SCORE: 3
GAD7 TOTAL SCORE: 3
6. BECOMING EASILY ANNOYED OR IRRITABLE: NOT AT ALL
2. NOT BEING ABLE TO STOP OR CONTROL WORRYING: SEVERAL DAYS
4. TROUBLE RELAXING: NOT AT ALL
1. FEELING NERVOUS, ANXIOUS, OR ON EDGE: NOT AT ALL
7. FEELING AFRAID AS IF SOMETHING AWFUL MIGHT HAPPEN: SEVERAL DAYS

## 2019-01-24 ASSESSMENT — PATIENT HEALTH QUESTIONNAIRE - PHQ9
10. IF YOU CHECKED OFF ANY PROBLEMS, HOW DIFFICULT HAVE THESE PROBLEMS MADE IT FOR YOU TO DO YOUR WORK, TAKE CARE OF THINGS AT HOME, OR GET ALONG WITH OTHER PEOPLE: SOMEWHAT DIFFICULT
SUM OF ALL RESPONSES TO PHQ QUESTIONS 1-9: 4
SUM OF ALL RESPONSES TO PHQ QUESTIONS 1-9: 4

## 2019-01-24 ASSESSMENT — PAIN SCALES - GENERAL: PAINLEVEL: NO PAIN (0)
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
## 2019-01-24 NOTE — NURSING NOTE
Chief Complaint   Patient presents with     RECHECK     Bronchiectasis    MIPS up-to-date on health maintenance   Elvie Marcos [facility]

## 2019-01-24 NOTE — PATIENT INSTRUCTIONS
Bronchiectasis Self-Care Plan       MRN: [id number]   Clinic Date: January 24, 2019   Patient: Michelle Epstein     RECOMMENDATIONS:   Azithromycin 250mg daily.  Increase exercise.  Otherwise continue current medication, nebs and vest therapy.   Follow up in ENT clinic.        CF Nurse Line:  Josh Feliz: [phone]   Spring Mancini, RT: [phone]       Pulmonary Function Tests  FEV1: amount of air you can blow out in 1 second  FVC: total amount of air you can take in and blow out    Your Goals:         PFT Latest Ref Rng & Units 1/24/2019   FVC L 3.83   FEV1 L 3.14   FVC% % 98   FEV1% % 96          Airway Clearance: The Most Important Way to Keep Your Lungs Healthy  Vest Settings:    Hill-Rom Frequencies: 8, 9, 10 Pressure 10 Then, Frequencies 18, 19, 20 Pressure 6      RespirTech: Quick Start with Pressure of     Do each frequency for 5 minutes; Deflate vest after each frequency & cough 3 times before beginning the next setting.    Vest and Neb Therapy should be done 2 times/day.      Wt Readings from Last 3 Encounters:   01/24/19 80 kg (176 lb 5.9 oz)   09/18/18 80.3 kg (177 lb 0.5 oz)   06/07/18 80.5 kg (177 lb 7.5 oz)       Body mass index is 29.56 kg/m .
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
## 2019-01-24 NOTE — TELEPHONE ENCOUNTER
Prior Authorization Not Needed per Insurance    Medication: tobramycin, PF, (RAJI) 300 MG/5ML neb solution (NO PA NEEDED)  Insurance Company: [employer] - Phone [phone] Fax [phone]  Expected CoPay: $0    [facility] - [place], [place] - [address]  Pharmacy Notified: Yes  Patient Notified: Yes

## 2019-01-25 LAB — IGE SERPL-ACNC: 58 KIU/L (ref 0–114)

## 2019-01-25 ASSESSMENT — ANXIETY QUESTIONNAIRES: GAD7 TOTAL SCORE: 3

## 2019-01-25 ASSESSMENT — PATIENT HEALTH QUESTIONNAIRE - PHQ9: SUM OF ALL RESPONSES TO PHQ QUESTIONS 1-9: 4

## 2019-01-31 LAB
EXPTIME-PRE: 6.87 SEC
FEF2575-%PRED-PRE: 90 %
FEF2575-PRE: 3.14 L/SEC
FEF2575-PRED: 3.47 L/SEC
FEFMAX-%PRED-PRE: 109 %
FEFMAX-PRE: 7.86 L/SEC
FEFMAX-PRED: 7.17 L/SEC
FEV1-%PRED-PRE: 96 %
FEV1-PRE: 3.14 L
FEV1FEV6-PRE: 82 %
FEV1FEV6-PRED: 85 %
FEV1FVC-PRE: 82 %
FEV1FVC-PRED: 83 %
FIFMAX-PRE: 4.27 L/SEC
FVC-%PRED-PRE: 98 %
FVC-PRE: 3.83 L
FVC-PRED: 3.9 L

## 2019-02-18 ENCOUNTER — E-VISIT (OUTPATIENT)
Dept: FAMILY MEDICINE | Facility: CLINIC | Age: 34
End: 2019-02-18
Payer: COMMERCIAL

## 2019-02-18 DIAGNOSIS — J45.40 MODERATE PERSISTENT ASTHMA WITHOUT COMPLICATION: ICD-10-CM

## 2019-02-18 DIAGNOSIS — J47.9 ADULT BRONCHIECTASIS (H): ICD-10-CM

## 2019-02-18 DIAGNOSIS — J01.90 ACUTE SINUSITIS WITH SYMPTOMS > 10 DAYS: Primary | ICD-10-CM

## 2019-02-18 PROCEDURE — 99444 ZZC PHYSICIAN ONLINE EVALUATION & MANAGEMENT SERVICE: CPT | Performed by: NURSE PRACTITIONER

## 2019-02-18 RX ORDER — PREDNISONE 20 MG/1
40 TABLET ORAL DAILY
Qty: 14 TABLET | Refills: 0 | Status: SHIPPED | OUTPATIENT
Start: 2019-02-18 | End: 2019-03-27

## 2019-02-18 RX ORDER — LEVOFLOXACIN 500 MG/1
500 TABLET, FILM COATED ORAL DAILY
Qty: 7 TABLET | Refills: 0 | Status: SHIPPED | OUTPATIENT
Start: 2019-02-18 | End: 2019-06-18

## 2019-02-18 NOTE — PATIENT INSTRUCTIONS
Stop azithromycin while taking the Levaquin. Monitor for tendon pain in elbows/achilles and if occurs stop medication and call right away. If not improved within the 7 day period you need to come into the clinic for further evaluation. Call if you have questions or concerns. The steroid will be the prednisone, 40 mg daily for 7 days.

## 2019-03-27 ENCOUNTER — OFFICE VISIT (OUTPATIENT)
Dept: OTOLARYNGOLOGY | Facility: CLINIC | Age: 34
End: 2019-03-27
Payer: COMMERCIAL

## 2019-03-27 VITALS
BODY MASS INDEX: 29.82 KG/M2 | DIASTOLIC BLOOD PRESSURE: 77 MMHG | HEIGHT: 65 IN | WEIGHT: 179 LBS | SYSTOLIC BLOOD PRESSURE: 112 MMHG | HEART RATE: 89 BPM | TEMPERATURE: 98.7 F

## 2019-03-27 DIAGNOSIS — H61.22 IMPACTED CERUMEN OF LEFT EAR: Primary | ICD-10-CM

## 2019-03-27 ASSESSMENT — MIFFLIN-ST. JEOR: SCORE: 1505.94

## 2019-03-27 ASSESSMENT — PAIN SCALES - GENERAL: PAINLEVEL: NO PAIN (0)

## 2019-03-27 NOTE — LETTER
3/27/2019       RE: Michelle Epstein  [address]   [place] [address]     Dear Colleague,    Thank you for referring your patient, Michelle Epstein, to the [facility] EAR NOSE AND THROAT at [facility]. Please see a copy of my visit note below.    Michelle is here for her left ear symptoms. Sinus issues are stable. Left ear is uncomfortable and hearing is maybe decreased. Dr. Luu saw her and recommended she be seen due to abnormal exam. This has happened in the past as well.    Exam: R ear normal, left ear with thick wax on TM, tried to clean with otoscope and suction and was unable therefore a microscope was used.     Procedure: left microscopy: the left TM was visualized and a thick layer of wax was removed from the surface of the TM. The TM itself was mildly inflamed, but landmarks were normal    Cleaned left ear under microscope - thick wax on TM      Again, thank you for allowing me to participate in the care of your patient.      Sincerely,    Asmita Dallas MD

## 2019-03-27 NOTE — PATIENT INSTRUCTIONS
Thank You for choosing [facility]. You were seen in the ENT  Clinic today by Dr.Boyer Donnelly would like you to follow up as needed.    If you have any questions or concerns after your appointment, please  Call [phone].

## 2019-03-27 NOTE — NURSING NOTE
"Chief Complaint   Patient presents with     RECHECK     sinus follow up      Blood pressure 112/77, pulse 89, temperature 98.7  F (37.1  C), height 1.64 m (5' 4.57\"), weight 81.2 kg (179 lb).    Ty Fofana LPN    "

## 2019-04-23 ENCOUNTER — E-VISIT (OUTPATIENT)
Dept: FAMILY MEDICINE | Facility: CLINIC | Age: 34
End: 2019-04-23

## 2019-04-23 DIAGNOSIS — J01.90 ACUTE SINUSITIS WITH SYMPTOMS > 10 DAYS: Primary | ICD-10-CM

## 2019-04-23 PROCEDURE — 99444 ZZC PHYSICIAN ONLINE EVALUATION & MANAGEMENT SERVICE: CPT | Performed by: NURSE PRACTITIONER

## 2019-04-23 RX ORDER — LEVOFLOXACIN 750 MG/1
750 TABLET, FILM COATED ORAL DAILY
Qty: 7 TABLET | Refills: 0 | Status: SHIPPED | OUTPATIENT
Start: 2019-04-23 | End: 2019-06-18

## 2019-04-23 RX ORDER — PREDNISONE 20 MG/1
40 TABLET ORAL DAILY
Qty: 10 TABLET | Refills: 0 | Status: SHIPPED | OUTPATIENT
Start: 2019-04-23 | End: 2019-06-18

## 2019-04-23 NOTE — PATIENT INSTRUCTIONS
Thank you for choosing us for your care. I have placed an order for a prescription so that you can start treatment. View your full visit summary for details by clicking on the link below. Your pharmacist will able to address any questions you may have about the medication.     If you're not feeling better within 5-7 days, please schedule an appointment.  You can schedule an appointment right here [place], or call [phone]  If the visit is for the same symptoms as your e-visit, we'll refund the cost of your e-visit if seen within seven days.

## 2019-04-25 DIAGNOSIS — J47.9 ADULT BRONCHIECTASIS (H): Primary | ICD-10-CM

## 2019-04-25 DIAGNOSIS — J47.9 BRONCHIECTASIS WITHOUT COMPLICATION (H): ICD-10-CM

## 2019-04-29 ENCOUNTER — E-VISIT (OUTPATIENT)
Dept: FAMILY MEDICINE | Facility: CLINIC | Age: 34
End: 2019-04-29

## 2019-04-29 ENCOUNTER — DOCUMENTATION ONLY (OUTPATIENT)
Dept: LAB | Facility: CLINIC | Age: 34
End: 2019-04-29

## 2019-04-29 DIAGNOSIS — Z00.00 ENCOUNTER FOR ROUTINE HISTORY AND PHYSICAL EXAMINATION OF ADULT: Primary | ICD-10-CM

## 2019-04-29 DIAGNOSIS — H66.91 RIGHT OTITIS MEDIA, UNSPECIFIED OTITIS MEDIA TYPE: Primary | ICD-10-CM

## 2019-04-29 PROCEDURE — 99444 ZZC PHYSICIAN ONLINE EVALUATION & MANAGEMENT SERVICE: CPT | Performed by: NURSE PRACTITIONER

## 2019-04-29 RX ORDER — NEOMYCIN SULFATE, POLYMYXIN B SULFATE AND HYDROCORTISONE 10; 3.5; 1 MG/ML; MG/ML; [USP'U]/ML
3 SUSPENSION/ DROPS AURICULAR (OTIC) 3 TIMES DAILY
Qty: 3 ML | Refills: 0 | Status: SHIPPED | OUTPATIENT
Start: 2019-04-29 | End: 2019-06-18

## 2019-05-10 ENCOUNTER — TELEPHONE (OUTPATIENT)
Dept: PULMONOLOGY | Facility: CLINIC | Age: 34
End: 2019-05-10

## 2019-05-10 DIAGNOSIS — J32.9 SINUS INFECTION: Primary | ICD-10-CM

## 2019-05-10 RX ORDER — PREDNISONE 20 MG/1
40 TABLET ORAL DAILY
Qty: 6 TABLET | Refills: 0 | Status: SHIPPED | OUTPATIENT
Start: 2019-05-10 | End: 2019-06-18

## 2019-05-10 RX ORDER — PREDNISONE 10 MG/1
TABLET ORAL
Qty: 18 TABLET | Refills: 0 | Status: SHIPPED | OUTPATIENT
Start: 2019-05-10 | End: 2019-06-18

## 2019-05-10 RX ORDER — LEVOFLOXACIN 750 MG/1
750 TABLET, FILM COATED ORAL DAILY
Qty: 14 TABLET | Refills: 0 | Status: SHIPPED | OUTPATIENT
Start: 2019-05-10 | End: 2019-06-18

## 2019-05-10 NOTE — TELEPHONE ENCOUNTER
Hi Dr. Luu,     My primary MD treated me for a sinus infection recently, however I am still not feeling well. I continue to have pain and pressure in my face. Congestion and a congested cough. SOB and wheezing a worse than usual. I feel more fatigued. No fever, Nasal discharge is yellow/blood tinged. Oral mucous when I am able to cough it up is yellow.  Wondering if I needed a longer course of treatment? Could you call in medication for me please?     Thank you   Michelle Epstein         Reviewed with Dr Luu and he's OK extending levaquin for another 2 weeks with a prednisone burst of 40 mg daily, tapering by 10 mg q 3 days. Patient agrees to plan and she'll stop taking levaquin if she experiences any tendon pain and will call office back.

## 2019-05-17 ENCOUNTER — MYC REFILL (OUTPATIENT)
Dept: FAMILY MEDICINE | Facility: CLINIC | Age: 34
End: 2019-05-17

## 2019-05-17 DIAGNOSIS — F41.9 ANXIETY: ICD-10-CM

## 2019-05-17 DIAGNOSIS — F32.1 MODERATE SINGLE CURRENT EPISODE OF MAJOR DEPRESSIVE DISORDER (H): ICD-10-CM

## 2019-05-20 DIAGNOSIS — J47.9 BRONCHIECTASIS WITHOUT COMPLICATION (H): ICD-10-CM

## 2019-05-20 DIAGNOSIS — E84.0 CYSTIC FIBROSIS WITH PULMONARY MANIFESTATIONS (H): Primary | ICD-10-CM

## 2019-05-20 NOTE — TELEPHONE ENCOUNTER
"Requested Prescriptions   Pending Prescriptions Disp Refills     buPROPion (WELLBUTRIN SR) 100 MG 12 hr tablet 90 tablet 0     Sig: Take 100 mg tab with 150 mg tab to = 250 mg daily       SSRIs Protocol Passed - 5/20/2019  7:19 AM        Passed - PHQ-9 score less than 5 in past 6 months     Please review last PHQ-9 score.           Passed - Medication is Bupropion     If the medication is Bupropion (Wellbutrin), and the patient is taking for smoking cessation; OK to refill.          Passed - Medication is active on med list        Passed - Patient is age 18 or older        Passed - No active pregnancy on record        Passed - No positive pregnancy test in last 12 months        Passed - Recent (6 mo) or future (30 days) visit within the authorizing provider's specialty     Patient had office visit in the last 6 months or has a visit in the next 30 days with authorizing provider or within the authorizing provider's specialty.  See \"Patient Info\" tab in inbasket, or \"Choose Columns\" in Meds & Orders section of the refill encounter.            buPROPion (WELLBUTRIN XL) 150 MG 24 hr tablet 90 tablet 1     Sig: Take 150 mg tab with 100 mg tab to = 250 mg daily       SSRIs Protocol Passed - 5/20/2019  7:19 AM        Passed - PHQ-9 score less than 5 in past 6 months     Please review last PHQ-9 score.           Passed - Medication is Bupropion     If the medication is Bupropion (Wellbutrin), and the patient is taking for smoking cessation; OK to refill.          Passed - Medication is active on med list        Passed - Patient is age 18 or older        Passed - No active pregnancy on record        Passed - No positive pregnancy test in last 12 months        Passed - Recent (6 mo) or future (30 days) visit within the authorizing provider's specialty     Patient had office visit in the last 6 months or has a visit in the next 30 days with authorizing provider or within the authorizing provider's specialty.  See \"Patient " "Info\" tab in inbasket, or \"Choose Columns\" in Meds & Orders section of the refill encounter.            buPROPion (WELLBUTRIN SR) 100 MG 12 hr tablet  Last Written Prescription Date:  1/24/19  Last Fill Quantity: 90,  # refills: 0   Last office visit: 6/4/2018 with prescribing provider:  Mindy Fink   Future Office Visit:   Next 5 appointments (look out 90 days)    May 29, 2019  9:40 AM SHE[facility] Adult with Mindy Fink NP  [facility] [address] [place] [id number]  [phone]         buPROPion (WELLBUTRIN XL) 150 MG 24 hr tablet  Last Written Prescription Date:  1/24/19  Last Fill Quantity: 90,  # refills: 1   Last office visit: 6/4/2018 with prescribing provider:  Mindy Fink   Future Office Visit:   Next 5 appointments (look out 90 days)    May 29, 2019  9:40 AM SHEILA [facility] Physical Adult with Mindy Fink NP  [facility] ([facility]) [address] [place] [id number]  [phone]           "

## 2019-05-20 NOTE — TELEPHONE ENCOUNTER
Routing refill request to provider for review/approval because:  Patient needs to be seen because:  Per E-visit on 1/24/19 patient is due for office visit.      Manny Lara RN, BSN, PHN

## 2019-05-21 ENCOUNTER — MYC REFILL (OUTPATIENT)
Dept: FAMILY MEDICINE | Facility: CLINIC | Age: 34
End: 2019-05-21

## 2019-05-21 DIAGNOSIS — F32.1 MODERATE SINGLE CURRENT EPISODE OF MAJOR DEPRESSIVE DISORDER (H): ICD-10-CM

## 2019-05-21 DIAGNOSIS — F41.9 ANXIETY: ICD-10-CM

## 2019-05-21 RX ORDER — BUPROPION HYDROCHLORIDE 150 MG/1
TABLET ORAL
Qty: 30 TABLET | Refills: 0 | Status: SHIPPED | OUTPATIENT
Start: 2019-05-21 | End: 2019-12-27

## 2019-05-21 RX ORDER — BUPROPION HYDROCHLORIDE 100 MG/1
TABLET, EXTENDED RELEASE ORAL
Qty: 30 TABLET | Refills: 0 | Status: SHIPPED | OUTPATIENT
Start: 2019-05-21 | End: 2019-12-27

## 2019-05-21 NOTE — TELEPHONE ENCOUNTER
"Requested Prescriptions   Pending Prescriptions Disp Refills     buPROPion (WELLBUTRIN SR) 100 MG 12 hr tablet  Last Written Prescription Date:  1/24/19  Last Fill Quantity: 90 tablet,  # refills: 0   Last office visit: 6/4/2018 with prescribing provider:  Mindy Fink NP   Future Office Visit:   Next 5 appointments (look out 90 days)    May 25, 2019 11:15 AM CDT  [facility] LAB  [facility] [address] [facility] [address] [phone]   May 29, 2019  9:40 AM CDT  MyChart Physical Adult with Mindy Fink, NP  [facility] [address] [place] [id number] [phone]          90 tablet 0     Sig: Take 100 mg tab with 150 mg tab to = 250 mg daily       SSRIs Protocol Passed - 5/21/2019 10:32 AM        Passed - PHQ-9 score less than 5 in past 6 months     Please review last PHQ-9 score.           Passed - Medication is Bupropion     If the medication is Bupropion (Wellbutrin), and the patient is taking for smoking cessation; OK to refill.          Passed - Medication is active on med list        Passed - Patient is age 18 or older        Passed - No active pregnancy on record        Passed - No positive pregnancy test in last 12 months        Passed - Recent (6 mo) or future (30 days) visit within the authorizing provider's specialty     Patient had office visit in the last 6 months or has a visit in the next 30 days with authorizing provider or within the authorizing provider's specialty.  See \"Patient Info\" tab in inbasket, or \"Choose Columns\" in Meds & Orders section of the refill encounter.                  buPROPion (WELLBUTRIN XL) 150 MG 24 hr tablet  Last Written Prescription Date:  1/24/19  Last Fill Quantity: 90 tablet,  # refills: 1   Last office visit: 6/4/2018 with prescribing provider:  Mindy Fink NP   Future Office Visit:   Next 5 appointments (look out 90 days)    May 25, 2019 11:15 AM " "CDT  Lab visit with AN LAB  [facility] ([facility]) [address] [facility] [address] [phone]   May 29, 2019  9:40 AM CDT  MyChart Physical Adult with Mindy Fink NP  [facility] ([facility]) [address] [facility] [id number] [phone]          90 tablet 1     Sig: Take 150 mg tab with 100 mg tab to = 250 mg daily       SSRIs Protocol Passed - 5/21/2019 10:32 AM        Passed - PHQ-9 score less than 5 in past 6 months     Please review last PHQ-9 score.           Passed - Medication is Bupropion     If the medication is Bupropion (Wellbutrin), and the patient is taking for smoking cessation; OK to refill.          Passed - Medication is active on med list        Passed - Patient is age 18 or older        Passed - No active pregnancy on record        Passed - No positive pregnancy test in last 12 months        Passed - Recent (6 mo) or future (30 days) visit within the authorizing provider's specialty     Patient had office visit in the last 6 months or has a visit in the next 30 days with authorizing provider or within the authorizing provider's specialty.  See \"Patient Info\" tab in inbasket, or \"Choose Columns\" in Meds & Orders section of the refill encounter.              "

## 2019-05-22 NOTE — TELEPHONE ENCOUNTER
30 day refill given. Will give further refills at appointment on 5/29/19.  MIKE Chatterjee, NP-C  [facility]

## 2019-05-23 RX ORDER — BUPROPION HYDROCHLORIDE 100 MG/1
TABLET, EXTENDED RELEASE ORAL
Qty: 90 TABLET | Refills: 0 | OUTPATIENT
Start: 2019-05-23

## 2019-05-23 RX ORDER — BUPROPION HYDROCHLORIDE 150 MG/1
TABLET ORAL
Qty: 90 TABLET | Refills: 1 | OUTPATIENT
Start: 2019-05-23

## 2019-05-23 NOTE — TELEPHONE ENCOUNTER
90 day supply not appropriate at this time; patient needs office visit first.           Manny Lara RN, BSN, PHN

## 2019-05-29 DIAGNOSIS — J47.9 BRONCHIECTASIS WITHOUT ACUTE EXACERBATION (H): ICD-10-CM

## 2019-05-29 RX ORDER — FLUOCINOLONE ACETONIDE 0.11 MG/ML
OIL AURICULAR (OTIC)
Qty: 20 ML | Refills: 0 | Status: SHIPPED | OUTPATIENT
Start: 2019-05-29 | End: 2019-12-27

## 2019-05-30 ENCOUNTER — MYC REFILL (OUTPATIENT)
Dept: PULMONOLOGY | Facility: CLINIC | Age: 34
End: 2019-05-30

## 2019-05-30 DIAGNOSIS — J47.9 BRONCHIECTASIS WITHOUT ACUTE EXACERBATION (H): ICD-10-CM

## 2019-05-30 DIAGNOSIS — B37.31 YEAST INFECTION OF THE VAGINA: ICD-10-CM

## 2019-05-30 RX ORDER — ALBUTEROL SULFATE 90 UG/1
2 AEROSOL, METERED RESPIRATORY (INHALATION) EVERY 6 HOURS PRN
Qty: 8.5 G | Refills: 11 | Status: SHIPPED | OUTPATIENT
Start: 2019-05-30 | End: 2019-09-10

## 2019-05-30 RX ORDER — FLUCONAZOLE 150 MG/1
150 TABLET ORAL
Qty: 2 TABLET | Refills: 1 | Status: SHIPPED | OUTPATIENT
Start: 2019-05-30 | End: 2019-08-15

## 2019-06-15 ASSESSMENT — ENCOUNTER SYMPTOMS
HEARTBURN: 0
NAUSEA: 0
DYSURIA: 0
SHORTNESS OF BREATH: 0
FREQUENCY: 0
PARESTHESIAS: 0
CHILLS: 0
ABDOMINAL PAIN: 0
PALPITATIONS: 0
HEADACHES: 0
SORE THROAT: 0
DIARRHEA: 0
BREAST MASS: 0
MYALGIAS: 0
HEMATURIA: 0
COUGH: 0
EYE PAIN: 0
ARTHRALGIAS: 0
DIZZINESS: 0
NERVOUS/ANXIOUS: 0
JOINT SWELLING: 0
FEVER: 0
HEMATOCHEZIA: 0
WEAKNESS: 0
CONSTIPATION: 0

## 2019-06-16 DIAGNOSIS — Z00.00 ENCOUNTER FOR ROUTINE HISTORY AND PHYSICAL EXAMINATION OF ADULT: ICD-10-CM

## 2019-06-16 LAB
ERYTHROCYTE [DISTWIDTH] IN BLOOD BY AUTOMATED COUNT: 13.4 % (ref 10–15)
HCT VFR BLD AUTO: 42.2 % (ref 35–47)
HGB BLD-MCNC: 13.7 G/DL (ref 11.7–15.7)
MCH RBC QN AUTO: 29.7 PG (ref 26.5–33)
MCHC RBC AUTO-ENTMCNC: 32.5 G/DL (ref 31.5–36.5)
MCV RBC AUTO: 92 FL (ref 78–100)
PLATELET # BLD AUTO: 248 10E9/L (ref 150–450)
RBC # BLD AUTO: 4.61 10E12/L (ref 3.8–5.2)
WBC # BLD AUTO: 6.7 10E9/L (ref 4–11)

## 2019-06-16 PROCEDURE — 80053 COMPREHEN METABOLIC PANEL: CPT | Performed by: NURSE PRACTITIONER

## 2019-06-16 PROCEDURE — 80061 LIPID PANEL: CPT | Performed by: NURSE PRACTITIONER

## 2019-06-16 PROCEDURE — 36415 COLL VENOUS BLD VENIPUNCTURE: CPT | Performed by: NURSE PRACTITIONER

## 2019-06-16 PROCEDURE — 85027 COMPLETE CBC AUTOMATED: CPT | Performed by: NURSE PRACTITIONER

## 2019-06-17 LAB
ALBUMIN SERPL-MCNC: 3.8 G/DL (ref 3.4–5)
ALP SERPL-CCNC: 48 U/L (ref 40–150)
ALT SERPL W P-5'-P-CCNC: 22 U/L (ref 0–50)
ANION GAP SERPL CALCULATED.3IONS-SCNC: 9 MMOL/L (ref 3–14)
AST SERPL W P-5'-P-CCNC: 15 U/L (ref 0–45)
BILIRUB SERPL-MCNC: 0.6 MG/DL (ref 0.2–1.3)
BUN SERPL-MCNC: 10 MG/DL (ref 7–30)
CALCIUM SERPL-MCNC: 8.5 MG/DL (ref 8.5–10.1)
CHLORIDE SERPL-SCNC: 110 MMOL/L (ref 94–109)
CHOLEST SERPL-MCNC: 211 MG/DL
CO2 SERPL-SCNC: 23 MMOL/L (ref 20–32)
CREAT SERPL-MCNC: 1 MG/DL (ref 0.52–1.04)
GFR SERPL CREATININE-BSD FRML MDRD: 73 ML/MIN/{1.73_M2}
GLUCOSE SERPL-MCNC: 94 MG/DL (ref 70–99)
HDLC SERPL-MCNC: 42 MG/DL
LDLC SERPL CALC-MCNC: 109 MG/DL
NONHDLC SERPL-MCNC: 169 MG/DL
POTASSIUM SERPL-SCNC: 4.2 MMOL/L (ref 3.4–5.3)
PROT SERPL-MCNC: 7 G/DL (ref 6.8–8.8)
SODIUM SERPL-SCNC: 142 MMOL/L (ref 133–144)
TRIGL SERPL-MCNC: 302 MG/DL

## 2019-06-18 ENCOUNTER — MYC REFILL (OUTPATIENT)
Dept: PULMONOLOGY | Facility: CLINIC | Age: 34
End: 2019-06-18

## 2019-06-18 ENCOUNTER — OFFICE VISIT (OUTPATIENT)
Dept: FAMILY MEDICINE | Facility: CLINIC | Age: 34
End: 2019-06-18
Payer: COMMERCIAL

## 2019-06-18 ENCOUNTER — MYC REFILL (OUTPATIENT)
Dept: OTOLARYNGOLOGY | Facility: CLINIC | Age: 34
End: 2019-06-18

## 2019-06-18 VITALS
SYSTOLIC BLOOD PRESSURE: 106 MMHG | HEART RATE: 89 BPM | BODY MASS INDEX: 29.66 KG/M2 | WEIGHT: 178 LBS | DIASTOLIC BLOOD PRESSURE: 80 MMHG | OXYGEN SATURATION: 100 % | TEMPERATURE: 98.6 F | HEIGHT: 65 IN | RESPIRATION RATE: 20 BRPM

## 2019-06-18 DIAGNOSIS — E66.3 OVERWEIGHT (BMI 25.0-29.9): ICD-10-CM

## 2019-06-18 DIAGNOSIS — J32.4 CHRONIC PANSINUSITIS: ICD-10-CM

## 2019-06-18 DIAGNOSIS — J47.9 ADULT BRONCHIECTASIS (H): ICD-10-CM

## 2019-06-18 DIAGNOSIS — E04.1 THYROID NODULE: ICD-10-CM

## 2019-06-18 DIAGNOSIS — J47.9 BRONCHIECTASIS WITHOUT ACUTE EXACERBATION (H): ICD-10-CM

## 2019-06-18 DIAGNOSIS — F32.1 MODERATE SINGLE CURRENT EPISODE OF MAJOR DEPRESSIVE DISORDER (H): ICD-10-CM

## 2019-06-18 DIAGNOSIS — E78.1 HYPERTRIGLYCERIDEMIA: ICD-10-CM

## 2019-06-18 DIAGNOSIS — Z00.00 ENCOUNTER FOR PREVENTATIVE ADULT HEALTH CARE EXAMINATION: Primary | ICD-10-CM

## 2019-06-18 DIAGNOSIS — F41.9 ANXIETY: ICD-10-CM

## 2019-06-18 PROCEDURE — 99395 PREV VISIT EST AGE 18-39: CPT | Performed by: NURSE PRACTITIONER

## 2019-06-18 RX ORDER — FENOFIBRATE 54 MG/1
54 TABLET ORAL DAILY
Qty: 90 TABLET | Refills: 1 | Status: SHIPPED | OUTPATIENT
Start: 2019-06-18 | End: 2020-04-17

## 2019-06-18 RX ORDER — AZELASTINE 1 MG/ML
1 SPRAY, METERED NASAL 2 TIMES DAILY
Qty: 30 ML | Refills: 11 | Status: SHIPPED | OUTPATIENT
Start: 2019-06-18 | End: 2020-07-28

## 2019-06-18 RX ORDER — BUPROPION HYDROCHLORIDE 150 MG/1
TABLET ORAL
Qty: 30 TABLET | Refills: 0 | Status: CANCELLED | OUTPATIENT
Start: 2019-06-18

## 2019-06-18 RX ORDER — FENOFIBRATE 54 MG/1
TABLET ORAL DAILY
Status: CANCELLED | OUTPATIENT
Start: 2019-06-18

## 2019-06-18 RX ORDER — BUPROPION HYDROCHLORIDE 100 MG/1
TABLET, EXTENDED RELEASE ORAL
Qty: 30 TABLET | Refills: 0 | Status: CANCELLED | OUTPATIENT
Start: 2019-06-18

## 2019-06-18 RX ORDER — BUPROPION HYDROCHLORIDE 300 MG/1
300 TABLET ORAL EVERY MORNING
Qty: 90 TABLET | Refills: 1 | Status: SHIPPED | OUTPATIENT
Start: 2019-06-18 | End: 2020-02-20

## 2019-06-18 ASSESSMENT — PAIN SCALES - GENERAL: PAINLEVEL: NO PAIN (0)

## 2019-06-18 ASSESSMENT — ENCOUNTER SYMPTOMS
FEVER: 0
JOINT SWELLING: 0
NAUSEA: 0
DIARRHEA: 0
SHORTNESS OF BREATH: 0
PALPITATIONS: 0
NERVOUS/ANXIOUS: 0
DYSURIA: 0
HEADACHES: 0
FREQUENCY: 0
HEMATURIA: 0
SORE THROAT: 0
ABDOMINAL PAIN: 0
WEAKNESS: 0
MYALGIAS: 0
COUGH: 0
ARTHRALGIAS: 0
PARESTHESIAS: 0
BREAST MASS: 0
HEARTBURN: 0
CHILLS: 0
DIZZINESS: 0
HEMATOCHEZIA: 0
EYE PAIN: 0
CONSTIPATION: 0

## 2019-06-18 ASSESSMENT — ANXIETY QUESTIONNAIRES
1. FEELING NERVOUS, ANXIOUS, OR ON EDGE: NOT AT ALL
3. WORRYING TOO MUCH ABOUT DIFFERENT THINGS: SEVERAL DAYS
GAD7 TOTAL SCORE: 2
IF YOU CHECKED OFF ANY PROBLEMS ON THIS QUESTIONNAIRE, HOW DIFFICULT HAVE THESE PROBLEMS MADE IT FOR YOU TO DO YOUR WORK, TAKE CARE OF THINGS AT HOME, OR GET ALONG WITH OTHER PEOPLE: NOT DIFFICULT AT ALL
5. BEING SO RESTLESS THAT IT IS HARD TO SIT STILL: NOT AT ALL
6. BECOMING EASILY ANNOYED OR IRRITABLE: NOT AT ALL
7. FEELING AFRAID AS IF SOMETHING AWFUL MIGHT HAPPEN: NOT AT ALL
2. NOT BEING ABLE TO STOP OR CONTROL WORRYING: SEVERAL DAYS

## 2019-06-18 ASSESSMENT — MIFFLIN-ST. JEOR: SCORE: 1499.31

## 2019-06-18 ASSESSMENT — PATIENT HEALTH QUESTIONNAIRE - PHQ9
SUM OF ALL RESPONSES TO PHQ QUESTIONS 1-9: 3
5. POOR APPETITE OR OVEREATING: NOT AT ALL

## 2019-06-18 NOTE — PATIENT INSTRUCTIONS
Check TSH and lipids in 6 months, make e-visit or phone visit to go over results and also see how increase in Bupropion is going.    Follow up with Pulmonology in the next month or two    Call in the next week if sinus pain is worsening and need antibiotic

## 2019-06-18 NOTE — PROGRESS NOTES
SUBJECTIVE:   CC: Michelle Epstein is an 34 year old woman who presents for preventive health visit.     Healthy Habits:     Getting at least 3 servings of Calcium per day:  Yes    Bi-annual eye exam:  NO    Dental care twice a year:  Yes    Sleep apnea or symptoms of sleep apnea:  Daytime drowsiness    Diet:  Regular (no restrictions)    Frequency of exercise:  2-3 days/week    Duration of exercise:  15-30 minutes    Taking medications regularly:  Yes    Medication side effects:  None    PHQ-2 Total Score: 1    Additional concerns today:  No      Trying to do [place] nebs through a mask. Is having frontal sinus pain. It hasn't gone into her teeth yet. Last time on Levaquin got loose stools, her pulmonologist extended last  Can send antibiotic as part of today's visit if in next 7 days pain worsens.    Pap smear done in 2017 with HPV co-test, next due in 2022    Not exercising as much as she would like. Not much pop or sugary drinks. Drinks lots of coffee, just a splash of creamer. Some days she feels she does great, other days not so much.     Mood is stable. Doesn't really want to take 2 pills daily, interested in just going up to the 300 mg wellbutrin tab to try.        Today's PHQ-2 Score:   PHQ-2 ( 1999 Pfizer) 6/15/2019   Q1: Little interest or pleasure in doing things 1   Q2: Feeling down, depressed or hopeless 0   PHQ-2 Score 1   Q1: Little interest or pleasure in doing things Several days   Q2: Feeling down, depressed or hopeless Not at all   PHQ-2 Score 1       Abuse: Current or Past(Physical, Sexual or Emotional)- No  Do you feel safe in your environment? Yes    Social History     Tobacco Use     Smoking status: Never Smoker     Smokeless tobacco: Never Used     Tobacco comment: lives in nonsmoking household    Substance Use Topics     Alcohol use: Yes     Alcohol/week: 0.0 oz     Comment: rare, not in PG     If you drink alcohol do you typically have >3 drinks per day or >7 drinks per week? No    Alcohol  Use 6/15/2019   Prescreen: >3 drinks/day or >7 drinks/week? No   No flowsheet data found.    Reviewed orders with patient.  Reviewed health maintenance and updated orders accordingly - Yes  Lab work is in process    Mammogram not appropriate for this patient based on age.    Pertinent mammograms are reviewed under the imaging tab.  History of abnormal Pap smear: NO - age 30-65 PAP every 5 years with negative HPV co-testing recommended  PAP / HPV Latest Ref Rng & Units 1/27/2017 6/24/2013 5/28/2010   PAP - NIL NIL NIL   HPV 16 DNA NEG Negative - -   HPV 18 DNA NEG Negative - -   OTHER HR HPV NEG Negative - -     Reviewed and updated as needed this visit by clinical staff  Tobacco  Allergies  Meds  Problems  Med Hx  Surg Hx  Fam Hx  Soc Hx          Reviewed and updated as needed this visit by Provider  Tobacco  Allergies  Meds  Problems  Med Hx  Surg Hx  Fam Hx  Soc Hx             Review of Systems   Constitutional: Negative for chills and fever.   HENT: Negative for congestion, ear pain, hearing loss and sore throat.    Eyes: Negative for pain and visual disturbance.   Respiratory: Negative for cough and shortness of breath.    Cardiovascular: Negative for chest pain, palpitations and peripheral edema.   Gastrointestinal: Negative for abdominal pain, constipation, diarrhea, heartburn, hematochezia and nausea.   Breasts:  Negative for tenderness, breast mass and discharge.   Genitourinary: Negative for dysuria, frequency, genital sores, hematuria, pelvic pain, urgency, vaginal bleeding and vaginal discharge.   Musculoskeletal: Negative for arthralgias, joint swelling and myalgias.   Skin: Negative for rash.   Neurological: Negative for dizziness, weakness, headaches and paresthesias.   Psychiatric/Behavioral: Negative for mood changes. The patient is not nervous/anxious.        OBJECTIVE:   /80 (BP Location: Right arm, Patient Position: Sitting, Cuff Size: Adult Regular)   Pulse 89   Temp 98.6  F  "(37  C) (Oral)   Resp 20   Ht 1.645 m (5' 4.75\")   Wt 80.7 kg (178 lb)   SpO2 100%   BMI 29.85 kg/m    Physical Exam  GENERAL: healthy, alert and no distress  EYES: Eyes grossly normal to inspection, PERRL and conjunctivae and sclerae normal  HENT: ear canals and TM's normal, nose and mouth without ulcers or lesions  NECK: no adenopathy, no asymmetry, masses, or scars and thyroid normal to palpation  RESP: lungs clear to auscultation - no rales, rhonchi or wheezes  BREAST: normal without masses, tenderness or nipple discharge and no palpable axillary masses or adenopathy  CV: regular rate and rhythm, normal S1 S2, no S3 or S4, no murmur, click or rub  ABDOMEN: soft, overweight, nontender, no hepatosplenomegaly, no masses and bowel sounds normal  : pap deferred, not due  MS: no gross musculoskeletal defects noted  SKIN: no suspicious lesions or rashes  NEURO: Normal strength and tone, mentation intact and speech normal  PSYCH: mentation appears normal, affect normal/bright    Diagnostic Test Results:  Labs reviewed in Epic  No results found for this or any previous visit (from the past 24 hour(s)).    ASSESSMENT/PLAN:   1. Encounter for preventative adult health care examination  Normal exam    2. Anxiety  Stable. Doesn't want to take 2 pills anymore so wants to try the 300 mg tab (instead of taking 150 mg and 100 mg tab daily). Follow up with NP in 6 months, phone or e-visit okay  - buPROPion (WELLBUTRIN XL) 300 MG 24 hr tablet; Take 1 tablet (300 mg) by mouth every morning  Dispense: 90 tablet; Refill: 1    3. Moderate single current episode of major depressive disorder (H)  As above  - buPROPion (WELLBUTRIN XL) 300 MG 24 hr tablet; Take 1 tablet (300 mg) by mouth every morning  Dispense: 90 tablet; Refill: 1    4. Adult bronchiectasis (H)  Stable. Follow up with Pulmonology    5. Thyroid nodule  Stable, no symptoms. Last TSH was a year ago. Repeat in 6 months  - **TSH with free T4 reflex FUTURE 1yr; " "Future    6. Hypertriglyceridemia  Has FH of this. Is overweight. Repeat in 6 months. If still high, start fenofibrate 54 mg daily  - Lipid panel reflex to direct LDL Fasting; Future  -fenofibrate 54 mg daily, call if muscle aches    7. Overweight (BMI 25.0-29.9)  She is going to work on healthy diet and exercise. It may also help her triglycerides if she looses some weight, eats healthier      COUNSELING:  Reviewed preventive health counseling, as reflected in patient instructions    Estimated body mass index is 29.85 kg/m  as calculated from the following:    Height as of this encounter: 1.645 m (5' 4.75\").    Weight as of this encounter: 80.7 kg (178 lb).    Weight management plan: Discussed healthy diet and exercise guidelines     reports that she has never smoked. She has never used smokeless tobacco.  Rare alcohol use    Counseling Resources:  ATP IV Guidelines  Pooled Cohorts Equation Calculator  Breast Cancer Risk Calculator  FRAX Risk Assessment  ICSI Preventive Guidelines  Dietary Guidelines for Americans, 2010  USDA's MyPlate  ASA Prophylaxis  Lung CA Screening    In-person in 1 year, 6 month phone/e-visit    MIKE Chatterjee, NP-C  [facility]    "

## 2019-06-19 ASSESSMENT — ANXIETY QUESTIONNAIRES: GAD7 TOTAL SCORE: 2

## 2019-06-19 ASSESSMENT — ASTHMA QUESTIONNAIRES: ACT_TOTALSCORE: 20

## 2019-06-26 RX ORDER — FLUTICASONE PROPIONATE 50 MCG
2 SPRAY, SUSPENSION (ML) NASAL DAILY
Qty: 16 G | Refills: 6 | Status: SHIPPED | OUTPATIENT
Start: 2019-06-26 | End: 2023-02-06

## 2019-08-15 ENCOUNTER — E-VISIT (OUTPATIENT)
Dept: FAMILY MEDICINE | Facility: CLINIC | Age: 34
End: 2019-08-15
Payer: COMMERCIAL

## 2019-08-15 DIAGNOSIS — B37.31 YEAST INFECTION OF THE VAGINA: ICD-10-CM

## 2019-08-15 DIAGNOSIS — J47.9 BRONCHIECTASIS WITHOUT ACUTE EXACERBATION (H): ICD-10-CM

## 2019-08-15 DIAGNOSIS — J01.00 ACUTE MAXILLARY SINUSITIS, RECURRENCE NOT SPECIFIED: ICD-10-CM

## 2019-08-15 PROCEDURE — 99444 ZZC PHYSICIAN ONLINE EVALUATION & MANAGEMENT SERVICE: CPT | Performed by: NURSE PRACTITIONER

## 2019-08-15 RX ORDER — PREDNISONE 20 MG/1
TABLET ORAL
Qty: 20 TABLET | Refills: 0 | Status: SHIPPED | OUTPATIENT
Start: 2019-08-15 | End: 2019-11-13

## 2019-08-15 RX ORDER — FLUCONAZOLE 150 MG/1
150 TABLET ORAL
Qty: 2 TABLET | Refills: 1 | Status: SHIPPED | OUTPATIENT
Start: 2019-08-15 | End: 2019-09-10

## 2019-08-15 RX ORDER — LEVOFLOXACIN 750 MG/1
750 TABLET, FILM COATED ORAL DAILY
Qty: 10 TABLET | Refills: 0 | Status: SHIPPED | OUTPATIENT
Start: 2019-08-15 | End: 2019-11-13

## 2019-09-08 ENCOUNTER — MYC REFILL (OUTPATIENT)
Dept: PULMONOLOGY | Facility: CLINIC | Age: 34
End: 2019-09-08

## 2019-09-08 DIAGNOSIS — J45.909 ASTHMA: ICD-10-CM

## 2019-09-08 DIAGNOSIS — M62.830 BACK MUSCLE SPASM: ICD-10-CM

## 2019-09-08 DIAGNOSIS — J30.81 ALLERGIC RHINITIS DUE TO ANIMALS: ICD-10-CM

## 2019-09-09 RX ORDER — MONTELUKAST SODIUM 10 MG/1
TABLET ORAL
Qty: 30 TABLET | Refills: 11 | Status: SHIPPED | OUTPATIENT
Start: 2019-09-09 | End: 2021-03-05

## 2019-09-09 RX ORDER — METHOCARBAMOL 750 MG/1
750 TABLET, FILM COATED ORAL 3 TIMES DAILY
Qty: 20 TABLET | Refills: 0 | Status: SHIPPED | OUTPATIENT
Start: 2019-09-09 | End: 2019-12-27

## 2019-09-10 ENCOUNTER — MYC REFILL (OUTPATIENT)
Dept: PULMONOLOGY | Facility: CLINIC | Age: 34
End: 2019-09-10

## 2019-09-10 ENCOUNTER — MYC REFILL (OUTPATIENT)
Dept: FAMILY MEDICINE | Facility: CLINIC | Age: 34
End: 2019-09-10

## 2019-09-10 DIAGNOSIS — B37.31 YEAST INFECTION OF THE VAGINA: ICD-10-CM

## 2019-09-10 DIAGNOSIS — J47.9 BRONCHIECTASIS WITHOUT COMPLICATION (H): ICD-10-CM

## 2019-09-10 DIAGNOSIS — J47.9 BRONCHIECTASIS WITHOUT ACUTE EXACERBATION (H): ICD-10-CM

## 2019-09-10 RX ORDER — ALBUTEROL SULFATE 0.83 MG/ML
2.5 SOLUTION RESPIRATORY (INHALATION) 4 TIMES DAILY
Qty: 360 ML | Refills: 12 | Status: SHIPPED | OUTPATIENT
Start: 2019-09-10 | End: 2020-10-15

## 2019-09-10 RX ORDER — ALBUTEROL SULFATE 90 UG/1
2 AEROSOL, METERED RESPIRATORY (INHALATION) EVERY 6 HOURS PRN
Qty: 8.5 G | Refills: 11 | Status: SHIPPED | OUTPATIENT
Start: 2019-09-10 | End: 2020-03-08

## 2019-09-10 RX ORDER — BUDESONIDE 1 MG/2ML
1 INHALANT ORAL 2 TIMES DAILY
Qty: 240 ML | Refills: 6 | Status: SHIPPED | OUTPATIENT
Start: 2019-09-10 | End: 2020-07-28

## 2019-09-10 NOTE — TELEPHONE ENCOUNTER
"Requested Prescriptions   Pending Prescriptions Disp Refills     fluconazole (DIFLUCAN) 150 MG tablet  Last Written Prescription Date:  8/15/19  Last Fill Quantity: 2 tablet,  # refills: 1   Last office visit: 6/18/2019 with prescribing provider:  Dr. James   Future Office Visit:     2 tablet 1     Sig: Take 1 tablet (150 mg) by mouth every 72 hours as needed Until symptoms resolve.       Antifungal Agents Failed - 9/10/2019  7:29 AM        Failed - Not Fluconazole or Terconazole      If oral Fluconazole or Terconazole, may refill if indicated in progress notes.           Passed - Recent (12 mo) or future (30 days) visit within the authorizing provider's specialty     Patient had office visit in the last 12 months or has a visit in the next 30 days with authorizing provider or within the authorizing provider's specialty.  See \"Patient Info\" tab in inbasket, or \"Choose Columns\" in Meds & Orders section of the refill encounter.              Passed - Medication is active on med list          "

## 2019-09-11 NOTE — TELEPHONE ENCOUNTER
Routing refill request to provider for review/approval because:  Protocol failed  Catalina Marcano RN

## 2019-09-13 RX ORDER — FLUCONAZOLE 150 MG/1
150 TABLET ORAL
Qty: 1 TABLET | Refills: 0 | Status: SHIPPED | OUTPATIENT
Start: 2019-09-13 | End: 2019-11-13

## 2019-09-13 NOTE — TELEPHONE ENCOUNTER
Will send 1 dose.  Patient needs E visit at minimum for further doses.  MIKE Chatterjee, NP-C  [facility]

## 2019-10-13 ENCOUNTER — TRANSFERRED RECORDS (OUTPATIENT)
Dept: HEALTH INFORMATION MANAGEMENT | Facility: CLINIC | Age: 34
End: 2019-10-13

## 2019-10-24 NOTE — TELEPHONE ENCOUNTER
RECORDS RECEIVED FROM: Internal/Care Everywhere   DATE RECEIVED: 11-19-19   NOTES STATUS DETAILS   OFFICE NOTE from referring provider    Internal Dr. Luu after ED visit in [facility]   OFFICE NOTE from other cardiologists   and neurologists N/A    DISCHARGE SUMMARY from hospital    N/A    DISCHARGE REPORT from the ER   Care Everywhere 10-13-19   OPERATIVE REPORT    N/A    MEDICATION LIST   Internal    LABS     BMP   Care Everywhere 10-13-19   CBC   Care Everywhere 10-13-19   CMP   Internal 6-16-19   Lipids   Internal 6-16-19   TSH   Internal 6-18-19   DIAGNOSTIC PROCEDURES     EKG  Strips *important In process Pt sent images through [facility], but requested directly for better quality pics   Monitor Reports  Strips *important N/A    Cardioversions   Care Everywhere 10-13-19: chemical cardioversion   ICD/pacemaker implant   No    Tilt table studies   N/A    IMAGING (DISC & REPORT)      ECHO's   N/A    Stress Tests   N/A    Cath   N/A    CT/CTA   N/A    MRI/MRA   N/A      Action    Action Taken 10-24: Requested 3 EKGs (from same day) from [facility]  11-19: Verified EKGs are scanned in epic

## 2019-11-10 ASSESSMENT — ENCOUNTER SYMPTOMS
SINUS PAIN: 1
SNORES LOUDLY: 0
POSTURAL DYSPNEA: 0
HOARSE VOICE: 0
SINUS CONGESTION: 1
COUGH DISTURBING SLEEP: 0
HEMOPTYSIS: 0
SHORTNESS OF BREATH: 0
SMELL DISTURBANCE: 0
PALPITATIONS: 1
HYPERTENSION: 0
DYSPNEA ON EXERTION: 1
SPUTUM PRODUCTION: 0
WHEEZING: 1
TASTE DISTURBANCE: 0
LIGHT-HEADEDNESS: 0
EXERCISE INTOLERANCE: 1
SORE THROAT: 0
TROUBLE SWALLOWING: 0
LEG PAIN: 0
SLEEP DISTURBANCES DUE TO BREATHING: 1
NECK MASS: 0
HYPOTENSION: 1
SYNCOPE: 0
ORTHOPNEA: 0
COUGH: 1

## 2019-11-13 ENCOUNTER — E-VISIT (OUTPATIENT)
Dept: FAMILY MEDICINE | Facility: CLINIC | Age: 34
End: 2019-11-13
Payer: COMMERCIAL

## 2019-11-13 DIAGNOSIS — J47.1 BRONCHIECTASIS WITH ACUTE EXACERBATION (H): ICD-10-CM

## 2019-11-13 DIAGNOSIS — B37.31 YEAST INFECTION OF THE VAGINA: ICD-10-CM

## 2019-11-13 DIAGNOSIS — J01.00 ACUTE NON-RECURRENT MAXILLARY SINUSITIS: Primary | ICD-10-CM

## 2019-11-13 PROCEDURE — 99444 ZZC PHYSICIAN ONLINE EVALUATION & MANAGEMENT SERVICE: CPT | Performed by: NURSE PRACTITIONER

## 2019-11-13 RX ORDER — LEVOFLOXACIN 750 MG/1
750 TABLET, FILM COATED ORAL DAILY
Qty: 10 TABLET | Refills: 0 | Status: SHIPPED | OUTPATIENT
Start: 2019-11-13 | End: 2019-12-06

## 2019-11-13 RX ORDER — FLUCONAZOLE 150 MG/1
150 TABLET ORAL
Qty: 1 TABLET | Refills: 1 | Status: SHIPPED | OUTPATIENT
Start: 2019-11-13 | End: 2019-12-29

## 2019-11-13 RX ORDER — PREDNISONE 10 MG/1
TABLET ORAL
Qty: 30 TABLET | Refills: 0 | Status: SHIPPED | OUTPATIENT
Start: 2019-11-13 | End: 2019-12-06

## 2019-11-19 ENCOUNTER — OFFICE VISIT (OUTPATIENT)
Dept: CARDIOLOGY | Facility: CLINIC | Age: 34
End: 2019-11-19
Attending: INTERNAL MEDICINE
Payer: COMMERCIAL

## 2019-11-19 ENCOUNTER — PRE VISIT (OUTPATIENT)
Dept: CARDIOLOGY | Facility: CLINIC | Age: 34
End: 2019-11-19

## 2019-11-19 VITALS — HEIGHT: 65 IN | BODY MASS INDEX: 30.99 KG/M2 | WEIGHT: 186 LBS

## 2019-11-19 DIAGNOSIS — I47.10 PAROXYSMAL SUPRAVENTRICULAR TACHYCARDIA (H): Primary | ICD-10-CM

## 2019-11-19 PROCEDURE — G0463 HOSPITAL OUTPT CLINIC VISIT: HCPCS

## 2019-11-19 PROCEDURE — 93010 ELECTROCARDIOGRAM REPORT: CPT | Mod: ZP | Performed by: INTERNAL MEDICINE

## 2019-11-19 PROCEDURE — 99204 OFFICE O/P NEW MOD 45 MIN: CPT | Mod: GC | Performed by: INTERNAL MEDICINE

## 2019-11-19 PROCEDURE — 93005 ELECTROCARDIOGRAM TRACING: CPT | Mod: ZF

## 2019-11-19 ASSESSMENT — MIFFLIN-ST. JEOR: SCORE: 1539.57

## 2019-11-19 ASSESSMENT — PAIN SCALES - GENERAL: PAINLEVEL: NO PAIN (0)
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
## 2019-11-19 NOTE — PATIENT INSTRUCTIONS
You were seen in the Electrophysiology Clinic today by: Dr. Fely Denise MD    Plan:     Medication Changes: none    Labs/Tests Needed: none    Follow up visit: as needed    Further Instructions: none      Your Care Team:  [facility] Cardiology   Telephone Number     Melissa Scott RN [phone]     For scheduling appts or procedures:    Joy Croft   [phone]   For the Device Clinic (Pacemakers, ICDs, Loop Recorders)    During business hours: [phone]  After business hours:   [phone]- select option 4 and ask for job code [phone].       Cardiovascular Clinic:   [address] [place] [address]. [place] [address]      As always, Thank you for trusting us with your health care needs!

## 2019-11-20 LAB — INTERPRETATION ECG - MUSE: NORMAL

## 2019-12-05 ENCOUNTER — MYC MEDICAL ADVICE (OUTPATIENT)
Dept: FAMILY MEDICINE | Facility: CLINIC | Age: 34
End: 2019-12-05

## 2019-12-05 DIAGNOSIS — J01.00 ACUTE NON-RECURRENT MAXILLARY SINUSITIS: ICD-10-CM

## 2019-12-05 RX ORDER — LEVOFLOXACIN 750 MG/1
750 TABLET, FILM COATED ORAL DAILY
Qty: 10 TABLET | Refills: 0 | Status: CANCELLED | OUTPATIENT
Start: 2019-12-05

## 2019-12-05 RX ORDER — PREDNISONE 10 MG/1
TABLET ORAL
Qty: 30 TABLET | Refills: 0 | Status: CANCELLED | OUTPATIENT
Start: 2019-12-05

## 2019-12-05 NOTE — TELEPHONE ENCOUNTER
Requested Prescriptions   Pending Prescriptions Disp Refills     predniSONE (DELTASONE) 10 MG tablet 30 tablet 0     Sig: Take 40 mg daily x 3 days, then 30 mg daily x 3 days, then 20 mg daily x 3 days, then 10 mg daily x 3 days       There is no refill protocol information for this order        levofloxacin (LEVAQUIN) 750 MG tablet 10 tablet 0     Sig: Take 1 tablet (750 mg) by mouth daily       There is no refill protocol information for this order        predniSONE (DELTASONE) 10 MG tablet  Last Written Prescription Date:  11/13/19  Last Fill Quantity: 30,  # refills: 0   Last office visit: 6/18/2019 with prescribing provider:  Mindy Fink   Future Office Visit:      levofloxacin (LEVAQUIN) 750 MG tablet  Last Written Prescription Date:  11/13/19  Last Fill Quantity: 10,  # refills: 0   Last office visit: 6/18/2019 with prescribing provider:  Mindy Fink   Future Office Visit:

## 2019-12-06 ENCOUNTER — E-VISIT (OUTPATIENT)
Dept: FAMILY MEDICINE | Facility: CLINIC | Age: 34
End: 2019-12-06
Payer: COMMERCIAL

## 2019-12-06 DIAGNOSIS — J01.00 ACUTE NON-RECURRENT MAXILLARY SINUSITIS: ICD-10-CM

## 2019-12-06 PROCEDURE — 99444 ZZC PHYSICIAN ONLINE EVALUATION & MANAGEMENT SERVICE: CPT | Performed by: NURSE PRACTITIONER

## 2019-12-06 RX ORDER — LEVOFLOXACIN 750 MG/1
750 TABLET, FILM COATED ORAL DAILY
Qty: 10 TABLET | Refills: 0 | Status: SHIPPED | OUTPATIENT
Start: 2019-12-06 | End: 2019-12-27

## 2019-12-06 RX ORDER — PREDNISONE 10 MG/1
TABLET ORAL
Qty: 30 TABLET | Refills: 0 | Status: SHIPPED | OUTPATIENT
Start: 2019-12-06 | End: 2019-12-27

## 2019-12-11 ENCOUNTER — MYC REFILL (OUTPATIENT)
Dept: FAMILY MEDICINE | Facility: CLINIC | Age: 34
End: 2019-12-11

## 2019-12-11 DIAGNOSIS — F32.1 MODERATE SINGLE CURRENT EPISODE OF MAJOR DEPRESSIVE DISORDER (H): ICD-10-CM

## 2019-12-12 NOTE — TELEPHONE ENCOUNTER
Routing refill request to provider for review/approval because:  A break in medication    Najma Puckett, RN

## 2019-12-12 NOTE — TELEPHONE ENCOUNTER
"Requested Prescriptions   Pending Prescriptions Disp Refills     hydrOXYzine (ATARAX) 25 MG tablet 30 tablet 1     Sig: Take 1-2 tablets (25-50 mg) by mouth every 6 hours as needed for itching       Antihistamines Protocol Passed - 12/12/2019  6:12 AM        Passed - Recent (12 mo) or future (30 days) visit within the authorizing provider's specialty     Patient has had an office visit with the authorizing provider or a provider within the authorizing providers department within the previous 12 mos or has a future within next 30 days. See \"Patient Info\" tab in inbasket, or \"Choose Columns\" in Meds & Orders section of the refill encounter.              Passed - Patient is age 3 or older     Apply age and/or weight-based dosing for peds patients age 3 and older.    Forward request to provider for patients under the age of 3.          Passed - Medication is active on med list        hydrOXYzine (ATARAX) 25 MG tablet  Last Written Prescription Date:  1/10/19  Last Fill Quantity: 30,  # refills: 1   Last office visit: 6/18/2019 with prescribing provider:  Mindy Fink   Future Office Visit:      "

## 2019-12-13 RX ORDER — HYDROXYZINE HYDROCHLORIDE 25 MG/1
25-50 TABLET, FILM COATED ORAL EVERY 6 HOURS PRN
Qty: 30 TABLET | Refills: 1 | Status: SHIPPED | OUTPATIENT
Start: 2019-12-13 | End: 2020-02-20

## 2019-12-19 DIAGNOSIS — J47.9 BRONCHIECTASIS (H): Primary | ICD-10-CM

## 2019-12-27 ENCOUNTER — OFFICE VISIT (OUTPATIENT)
Dept: PULMONOLOGY | Facility: CLINIC | Age: 34
End: 2019-12-27
Attending: INTERNAL MEDICINE
Payer: COMMERCIAL

## 2019-12-27 VITALS
DIASTOLIC BLOOD PRESSURE: 84 MMHG | OXYGEN SATURATION: 99 % | HEART RATE: 89 BPM | SYSTOLIC BLOOD PRESSURE: 120 MMHG | BODY MASS INDEX: 30.49 KG/M2 | WEIGHT: 182.98 LBS | HEIGHT: 65 IN

## 2019-12-27 DIAGNOSIS — J47.9 BRONCHIECTASIS WITHOUT ACUTE EXACERBATION (H): ICD-10-CM

## 2019-12-27 DIAGNOSIS — J01.01 ACUTE RECURRENT MAXILLARY SINUSITIS: ICD-10-CM

## 2019-12-27 DIAGNOSIS — J47.9 ADULT BRONCHIECTASIS (H): Primary | ICD-10-CM

## 2019-12-27 DIAGNOSIS — J47.1 BRONCHIECTASIS WITH ACUTE EXACERBATION (H): Primary | ICD-10-CM

## 2019-12-27 DIAGNOSIS — M62.830 BACK MUSCLE SPASM: ICD-10-CM

## 2019-12-27 LAB
EXPTIME-PRE: 8.8 SEC
FEF2575-%PRED-PRE: 51 %
FEF2575-PRE: 1.79 L/SEC
FEF2575-PRED: 3.46 L/SEC
FEFMAX-%PRED-PRE: 92 %
FEFMAX-PRE: 6.63 L/SEC
FEFMAX-PRED: 7.17 L/SEC
FEV1-%PRED-PRE: 87 %
FEV1-PRE: 2.82 L
FEV1FEV6-PRE: 74 %
FEV1FEV6-PRED: 85 %
FEV1FVC-PRE: 72 %
FEV1FVC-PRED: 84 %
FIFMAX-PRE: 3.24 L/SEC
FVC-%PRED-PRE: 100 %
FVC-PRE: 3.93 L
FVC-PRED: 3.89 L

## 2019-12-27 PROCEDURE — G0463 HOSPITAL OUTPT CLINIC VISIT: HCPCS

## 2019-12-27 RX ORDER — CLINDAMYCIN HCL 300 MG
300 CAPSULE ORAL 4 TIMES DAILY
Qty: 84 CAPSULE | Refills: 0 | Status: SHIPPED | OUTPATIENT
Start: 2019-12-27 | End: 2020-01-21

## 2019-12-27 RX ORDER — PREDNISONE 10 MG/1
TABLET ORAL
Qty: 50 TABLET | Refills: 0 | Status: SHIPPED | OUTPATIENT
Start: 2019-12-27 | End: 2020-02-27

## 2019-12-27 RX ORDER — DOXYCYCLINE HYCLATE 100 MG
100 TABLET ORAL 2 TIMES DAILY
Qty: 42 TABLET | Refills: 0 | Status: SHIPPED | OUTPATIENT
Start: 2019-12-27 | End: 2020-01-21

## 2019-12-27 RX ORDER — FLUOCINOLONE ACETONIDE 0.11 MG/ML
OIL AURICULAR (OTIC)
Qty: 20 ML | Refills: 0 | COMMUNITY
Start: 2019-12-27 | End: 2020-02-27

## 2019-12-27 RX ORDER — METHOCARBAMOL 750 MG/1
750 TABLET, FILM COATED ORAL 3 TIMES DAILY PRN
Qty: 20 TABLET | Refills: 0 | COMMUNITY
Start: 2019-12-27 | End: 2020-01-18

## 2019-12-27 ASSESSMENT — PAIN SCALES - GENERAL: PAINLEVEL: NO PAIN (0)

## 2019-12-27 ASSESSMENT — MIFFLIN-ST. JEOR: SCORE: 1525.88

## 2019-12-27 NOTE — PATIENT INSTRUCTIONS
-Clindamycin 300mg every 6 hours for 3 weeks.  -Doxycycline 100mg twice daily for 3 weeks.  Take with food to avoid nausea.  Be careful with sun exposure.   -Prednisne 10mg tablets - 40mg daily for 5 days, then 30mg daily for 5 days, then 20mg daily for 5 days, then 10mg daily for 5 days, then stop  -Hold azithromycin until done with doxycycline and clindamycin.  -Otherwise continue current medication, nebs and vest therapy.     Call us if you do not feel completely better by the end of the antibiotics or if you do no tfeel like you are making progress.
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
## 2019-12-27 NOTE — NURSING NOTE
Chief Complaint   Patient presents with     Follow Up     CF      Vitals were taken and medications were reconciled.    Zuleyka Ambrosio RMA  10:12 AM
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
## 2020-01-02 ENCOUNTER — MYC MEDICAL ADVICE (OUTPATIENT)
Dept: PULMONOLOGY | Facility: CLINIC | Age: 35
End: 2020-01-02

## 2020-01-02 DIAGNOSIS — B37.31 YEAST INFECTION OF THE VAGINA: ICD-10-CM

## 2020-01-02 RX ORDER — FLUCONAZOLE 150 MG/1
150 TABLET ORAL
Qty: 4 TABLET | Refills: 0 | Status: SHIPPED | OUTPATIENT
Start: 2020-01-02 | End: 2020-09-02

## 2020-01-21 DIAGNOSIS — J01.01 ACUTE RECURRENT MAXILLARY SINUSITIS: ICD-10-CM

## 2020-01-21 DIAGNOSIS — J47.1 BRONCHIECTASIS WITH ACUTE EXACERBATION (H): ICD-10-CM

## 2020-01-21 RX ORDER — CLINDAMYCIN HCL 300 MG
300 CAPSULE ORAL 4 TIMES DAILY
Qty: 56 CAPSULE | Refills: 0 | Status: SHIPPED | OUTPATIENT
Start: 2020-01-21 | End: 2020-02-27

## 2020-01-21 RX ORDER — DOXYCYCLINE HYCLATE 100 MG
100 TABLET ORAL 2 TIMES DAILY
Qty: 28 TABLET | Refills: 0 | Status: SHIPPED | OUTPATIENT
Start: 2020-01-21 | End: 2020-02-27

## 2020-01-27 DIAGNOSIS — R11.0 NAUSEA: ICD-10-CM

## 2020-01-27 DIAGNOSIS — J47.9 ADULT BRONCHIECTASIS (H): Primary | ICD-10-CM

## 2020-01-27 RX ORDER — ONDANSETRON 4 MG/1
4 TABLET, FILM COATED ORAL EVERY 8 HOURS PRN
Qty: 30 TABLET | Refills: 0 | Status: SHIPPED | OUTPATIENT
Start: 2020-01-27 | End: 2020-07-31

## 2020-01-29 DIAGNOSIS — Z30.011 ENCOUNTER FOR INITIAL PRESCRIPTION OF CONTRACEPTIVE PILLS: ICD-10-CM

## 2020-01-29 NOTE — TELEPHONE ENCOUNTER
"Requested Prescriptions   Pending Prescriptions Disp Refills     drospirenone-ethinyl estradiol (QUAN) 3-0.02 MG tablet 84 tablet 4     Sig: Take 1 tablet by mouth daily       Contraceptives Protocol Passed - 1/29/2020 11:14 AM        Passed - Patient is not a current smoker if age is 35 or older        Passed - Recent (12 mo) or future (30 days) visit within the authorizing provider's specialty     Patient has had an office visit with the authorizing provider or a provider within the authorizing providers department within the previous 12 mos or has a future within next 30 days. See \"Patient Info\" tab in inbasket, or \"Choose Columns\" in Meds & Orders section of the refill encounter.              Passed - Medication is active on med list        Passed - No active pregnancy on record        Passed - No positive pregnancy test in past 12 months        drospirenone-ethinyl estradiol (QUAN) 3-0.02 MG tablet  Last Written Prescription Date:  1/7/19  Last Fill Quantity: 84,  # refills: 4   Last office visit: 6/18/2019 with prescribing provider:  Mindy Fink   Future Office Visit:      "

## 2020-02-03 RX ORDER — DROSPIRENONE AND ETHINYL ESTRADIOL 0.02-3(28)
1 KIT ORAL DAILY
Qty: 84 TABLET | Refills: 1 | Status: SHIPPED | OUTPATIENT
Start: 2020-02-03 | End: 2020-07-29

## 2020-02-03 NOTE — TELEPHONE ENCOUNTER
Prescription approved per [facility] Refill Protocol.    Naila Craig RN, [facility] Triage

## 2020-02-11 DIAGNOSIS — J47.9 BRONCHIECTASIS (H): ICD-10-CM

## 2020-02-11 PROCEDURE — 87070 CULTURE OTHR SPECIMN AEROBIC: CPT | Performed by: INTERNAL MEDICINE

## 2020-02-11 PROCEDURE — 87205 SMEAR GRAM STAIN: CPT | Performed by: INTERNAL MEDICINE

## 2020-02-12 LAB
GRAM STN SPEC: ABNORMAL
Lab: ABNORMAL
SPECIMEN SOURCE: ABNORMAL

## 2020-02-13 ENCOUNTER — MYC MEDICAL ADVICE (OUTPATIENT)
Dept: PULMONOLOGY | Facility: CLINIC | Age: 35
End: 2020-02-13

## 2020-02-13 DIAGNOSIS — J47.9 BRONCHIECTASIS (H): ICD-10-CM

## 2020-02-13 RX ORDER — LEVOFLOXACIN 750 MG/1
750 TABLET, FILM COATED ORAL DAILY
Qty: 21 TABLET | Refills: 0 | Status: SHIPPED | OUTPATIENT
Start: 2020-02-13 | End: 2020-04-09

## 2020-02-13 RX ORDER — PREDNISONE 10 MG/1
TABLET ORAL
Qty: 11 TABLET | Refills: 0 | Status: SHIPPED | OUTPATIENT
Start: 2020-02-13 | End: 2020-02-27

## 2020-02-14 LAB
BACTERIA SPEC CULT: NORMAL
SPECIMEN SOURCE: NORMAL

## 2020-02-20 ENCOUNTER — E-VISIT (OUTPATIENT)
Dept: FAMILY MEDICINE | Facility: CLINIC | Age: 35
End: 2020-02-20
Payer: COMMERCIAL

## 2020-02-20 DIAGNOSIS — F32.1 MODERATE SINGLE CURRENT EPISODE OF MAJOR DEPRESSIVE DISORDER (H): ICD-10-CM

## 2020-02-20 DIAGNOSIS — F41.9 ANXIETY: ICD-10-CM

## 2020-02-20 PROCEDURE — 99421 OL DIG E/M SVC 5-10 MIN: CPT | Performed by: NURSE PRACTITIONER

## 2020-02-20 RX ORDER — HYDROXYZINE HYDROCHLORIDE 25 MG/1
25-50 TABLET, FILM COATED ORAL EVERY 6 HOURS PRN
Qty: 30 TABLET | Refills: 1 | Status: SHIPPED | OUTPATIENT
Start: 2020-02-20 | End: 2020-09-08

## 2020-02-20 RX ORDER — BUPROPION HYDROCHLORIDE 300 MG/1
300 TABLET ORAL EVERY MORNING
Qty: 90 TABLET | Refills: 1 | Status: SHIPPED | OUTPATIENT
Start: 2020-02-20 | End: 2020-09-24

## 2020-02-20 ASSESSMENT — ANXIETY QUESTIONNAIRES
5. BEING SO RESTLESS THAT IT IS HARD TO SIT STILL: NOT AT ALL
7. FEELING AFRAID AS IF SOMETHING AWFUL MIGHT HAPPEN: SEVERAL DAYS
7. FEELING AFRAID AS IF SOMETHING AWFUL MIGHT HAPPEN: SEVERAL DAYS
2. NOT BEING ABLE TO STOP OR CONTROL WORRYING: SEVERAL DAYS
1. FEELING NERVOUS, ANXIOUS, OR ON EDGE: NOT AT ALL
6. BECOMING EASILY ANNOYED OR IRRITABLE: NOT AT ALL
3. WORRYING TOO MUCH ABOUT DIFFERENT THINGS: SEVERAL DAYS
4. TROUBLE RELAXING: NOT AT ALL
GAD7 TOTAL SCORE: 3

## 2020-02-20 ASSESSMENT — PATIENT HEALTH QUESTIONNAIRE - PHQ9
SUM OF ALL RESPONSES TO PHQ QUESTIONS 1-9: 2
10. IF YOU CHECKED OFF ANY PROBLEMS, HOW DIFFICULT HAVE THESE PROBLEMS MADE IT FOR YOU TO DO YOUR WORK, TAKE CARE OF THINGS AT HOME, OR GET ALONG WITH OTHER PEOPLE: NOT DIFFICULT AT ALL
SUM OF ALL RESPONSES TO PHQ QUESTIONS 1-9: 2

## 2020-02-21 ASSESSMENT — ANXIETY QUESTIONNAIRES: GAD7 TOTAL SCORE: 3

## 2020-02-21 ASSESSMENT — PATIENT HEALTH QUESTIONNAIRE - PHQ9: SUM OF ALL RESPONSES TO PHQ QUESTIONS 1-9: 2

## 2020-02-24 ENCOUNTER — E-VISIT (OUTPATIENT)
Dept: FAMILY MEDICINE | Facility: CLINIC | Age: 35
End: 2020-02-24
Payer: COMMERCIAL

## 2020-02-24 DIAGNOSIS — J34.89 SINUS PAIN: Primary | ICD-10-CM

## 2020-02-24 PROCEDURE — 99207 ZZC NO BILLABLE SERVICE THIS VISIT: CPT | Performed by: NURSE PRACTITIONER

## 2020-02-25 NOTE — TELEPHONE ENCOUNTER
Provider E-Visit time total (minutes): 2. Needs to be seen. Not appropriate for e-visit.  MIKE Chatterjee, NP-C  [facility]

## 2020-02-27 ENCOUNTER — OFFICE VISIT (OUTPATIENT)
Dept: PULMONOLOGY | Facility: CLINIC | Age: 35
End: 2020-02-27
Attending: INTERNAL MEDICINE
Payer: COMMERCIAL

## 2020-02-27 ENCOUNTER — ANCILLARY PROCEDURE (OUTPATIENT)
Dept: CT IMAGING | Facility: CLINIC | Age: 35
End: 2020-02-27
Attending: INTERNAL MEDICINE
Payer: COMMERCIAL

## 2020-02-27 VITALS
HEART RATE: 113 BPM | TEMPERATURE: 98.7 F | WEIGHT: 185 LBS | OXYGEN SATURATION: 99 % | RESPIRATION RATE: 17 BRPM | SYSTOLIC BLOOD PRESSURE: 116 MMHG | BODY MASS INDEX: 30.82 KG/M2 | HEIGHT: 65 IN | DIASTOLIC BLOOD PRESSURE: 82 MMHG

## 2020-02-27 DIAGNOSIS — E84.9 CF (CYSTIC FIBROSIS) (H): Primary | ICD-10-CM

## 2020-02-27 DIAGNOSIS — T75.3XXA MOTION SICKNESS, INITIAL ENCOUNTER: ICD-10-CM

## 2020-02-27 DIAGNOSIS — J45.31 MILD PERSISTENT ASTHMA WITH ACUTE EXACERBATION: ICD-10-CM

## 2020-02-27 DIAGNOSIS — J47.9 ADULT BRONCHIECTASIS (H): ICD-10-CM

## 2020-02-27 DIAGNOSIS — J47.9 ADULT BRONCHIECTASIS (H): Primary | ICD-10-CM

## 2020-02-27 LAB
BASOPHILS # BLD AUTO: 0.1 10E9/L (ref 0–0.2)
BASOPHILS NFR BLD AUTO: 0.8 %
DIFFERENTIAL METHOD BLD: ABNORMAL
EOSINOPHIL # BLD AUTO: 1.4 10E9/L (ref 0–0.7)
EOSINOPHIL NFR BLD AUTO: 16.5 %
ERYTHROCYTE [DISTWIDTH] IN BLOOD BY AUTOMATED COUNT: 14.2 % (ref 10–15)
EXPTIME-PRE: 8.36 SEC
FEF2575-%PRED-PRE: 56 %
FEF2575-PRE: 1.95 L/SEC
FEF2575-PRED: 3.46 L/SEC
FEFMAX-%PRED-PRE: 76 %
FEFMAX-PRE: 5.49 L/SEC
FEFMAX-PRED: 7.17 L/SEC
FEV1-%PRED-PRE: 80 %
FEV1-PRE: 2.6 L
FEV1FEV6-PRE: 76 %
FEV1FEV6-PRED: 85 %
FEV1FVC-PRE: 74 %
FEV1FVC-PRED: 84 %
FIFMAX-PRE: 4.63 L/SEC
FVC-%PRED-PRE: 90 %
FVC-PRE: 3.53 L
FVC-PRED: 3.89 L
HCT VFR BLD AUTO: 44 % (ref 35–47)
HGB BLD-MCNC: 14 G/DL (ref 11.7–15.7)
IMM GRANULOCYTES # BLD: 0 10E9/L (ref 0–0.4)
IMM GRANULOCYTES NFR BLD: 0.4 %
LYMPHOCYTES # BLD AUTO: 2.5 10E9/L (ref 0.8–5.3)
LYMPHOCYTES NFR BLD AUTO: 30.3 %
MCH RBC QN AUTO: 29.5 PG (ref 26.5–33)
MCHC RBC AUTO-ENTMCNC: 31.8 G/DL (ref 31.5–36.5)
MCV RBC AUTO: 93 FL (ref 78–100)
MONOCYTES # BLD AUTO: 0.5 10E9/L (ref 0–1.3)
MONOCYTES NFR BLD AUTO: 6.1 %
NEUTROPHILS # BLD AUTO: 3.8 10E9/L (ref 1.6–8.3)
NEUTROPHILS NFR BLD AUTO: 45.9 %
NRBC # BLD AUTO: 0 10*3/UL
NRBC BLD AUTO-RTO: 0 /100
PLATELET # BLD AUTO: 292 10E9/L (ref 150–450)
RBC # BLD AUTO: 4.74 10E12/L (ref 3.8–5.2)
WBC # BLD AUTO: 8.2 10E9/L (ref 4–11)

## 2020-02-27 PROCEDURE — 82785 ASSAY OF IGE: CPT | Performed by: INTERNAL MEDICINE

## 2020-02-27 PROCEDURE — 85025 COMPLETE CBC W/AUTO DIFF WBC: CPT | Performed by: INTERNAL MEDICINE

## 2020-02-27 PROCEDURE — G0463 HOSPITAL OUTPT CLINIC VISIT: HCPCS | Mod: ZF

## 2020-02-27 PROCEDURE — 87070 CULTURE OTHR SPECIMN AEROBIC: CPT | Performed by: INTERNAL MEDICINE

## 2020-02-27 PROCEDURE — 36415 COLL VENOUS BLD VENIPUNCTURE: CPT | Performed by: INTERNAL MEDICINE

## 2020-02-27 RX ORDER — PREDNISONE 10 MG/1
40 TABLET ORAL DAILY
Qty: 120 TABLET | Refills: 0 | Status: SHIPPED | OUTPATIENT
Start: 2020-02-27 | End: 2020-06-18

## 2020-02-27 RX ORDER — LEVOFLOXACIN 750 MG/1
750 TABLET, FILM COATED ORAL DAILY
Qty: 21 TABLET | Refills: 0 | Status: SHIPPED | OUTPATIENT
Start: 2020-02-27 | End: 2020-04-09

## 2020-02-27 RX ORDER — SCOLOPAMINE TRANSDERMAL SYSTEM 1 MG/1
1 PATCH, EXTENDED RELEASE TRANSDERMAL
Qty: 3 PATCH | Refills: 0 | Status: SHIPPED | OUTPATIENT
Start: 2020-02-27 | End: 2020-04-09

## 2020-02-27 ASSESSMENT — MIFFLIN-ST. JEOR: SCORE: 1535.03

## 2020-02-27 ASSESSMENT — PAIN SCALES - GENERAL: PAINLEVEL: NO PAIN (0)
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
## 2020-02-27 NOTE — PATIENT INSTRUCTIONS
Prednisone 40mg daily for 3 days, 30mg for 3 days, 20mg for 3 days, 10mg for 3 days then stop  Stop RAJI nebs  Otherwise continue current medication, nebs and vest therapy.   Bring sputum sample in. Try to hold off on starting prednisone until after sputum is available.  Chest CT today.  Labs today.
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
## 2020-02-27 NOTE — NURSING NOTE
Chief Complaint   Patient presents with     RECHECK     Bronchiectas    Medications reviewed and vital signs taken.   Stevo Winter, CMA

## 2020-02-28 LAB — IGE SERPL-ACNC: 171 KIU/L (ref 0–114)

## 2020-02-29 LAB
BACTERIA SPEC CULT: NORMAL
Lab: NORMAL
SPECIMEN SOURCE: NORMAL

## 2020-03-08 ENCOUNTER — MYC REFILL (OUTPATIENT)
Dept: PULMONOLOGY | Facility: CLINIC | Age: 35
End: 2020-03-08

## 2020-03-08 DIAGNOSIS — J47.9 BRONCHIECTASIS WITHOUT ACUTE EXACERBATION (H): ICD-10-CM

## 2020-03-09 RX ORDER — ALBUTEROL SULFATE 90 UG/1
2 AEROSOL, METERED RESPIRATORY (INHALATION) EVERY 6 HOURS PRN
Qty: 8.5 G | Refills: 11 | Status: SHIPPED | OUTPATIENT
Start: 2020-03-09 | End: 2021-09-23

## 2020-03-12 NOTE — TELEPHONE ENCOUNTER
Prescription approved per [facility] Refill Protocol.      Manny Lara RN, BSN    Surgeon Performing The Repair (Optional): Louis Irene MD Biopsy Photograph Reviewed: Yes Size Of Lesion In Cm: 0.3 X Size Of Lesion In Cm (Optional): 0 Size Of Margin In Cm: 0.4 Excision Method: Elliptical Anesthesia Volume In Cc: 6 Did You Provide Opioid Counseling: No Repair Type: Intermediate Intermediate / Complex Repair - Final Wound Length In Cm: 4.1 Undermining Type: Entire Wound Debridement Text: The wound edges were debrided prior to proceeding with the closure to facilitate wound healing. Helical Rim Text: The closure involved the helical rim. Vermilion Border Text: The closure involved the vermilion border. Nostril Rim Text: The closure involved the nostril rim. Retention Suture Text: Retention sutures were placed to support the closure and prevent dehiscence. Suture Removal: 14 days Lab: [id number] Lab Facility: [id number] Graft Donor Site Bandage (Optional-Leave Blank If You Don't Want In Note): Steri-strips and a pressure bandage were applied to the donor site. Epidermal Closure Graft Donor Site (Optional): simple interrupted Billing Type: Third-Party Bill Excision Depth: adipose tissue Scalpel Size: 15 blade Anesthesia Type: 1% lidocaine with epinephrine Hemostasis: Electrocautery Estimated Blood Loss (Cc): minimal Detail Level: Detailed Anesthesia Type: 1% lidocaine with epinephrine and a 1:10 solution of 8.4% sodium bicarbonate Deep Sutures: 5-0 Vicryl Epidermal Sutures: 4-0 Ethilon Epidermal Closure: running Wound Care: Petrolatum Dressing: pressure dressing Positioning (Leave Blank If You Do Not Want): The patient was placed in a comfortable position exposing the surgical site. Pre-Excision Curettage Text (Leave Blank If You Do Not Want): Prior to drawing the surgical margin the visible lesion was removed with electrodesiccation and curettage to clearly define the lesion size. Complex Repair Preamble Text (Leave Blank If You Do Not Want): Extensive wide undermining was performed. Intermediate Repair Preamble Text (Leave Blank If You Do Not Want): Undermining was performed with blunt dissection. Curvilinear Excision Additional Text (Leave Blank If You Do Not Want): The margin was drawn around the clinically apparent lesion.  A curvilinear shape was then drawn on the skin incorporating the lesion and margins.  Incisions were then made along these lines to the appropriate tissue plane and the lesion was extirpated. Fusiform Excision Additional Text (Leave Blank If You Do Not Want): The margin was drawn around the clinically apparent lesion.  A fusiform shape was then drawn on the skin incorporating the lesion and margins.  Incisions were then made along these lines to the appropriate tissue plane and the lesion was extirpated. Elliptical Excision Additional Text (Leave Blank If You Do Not Want): The margin was drawn around the clinically apparent lesion.  An elliptical shape was then drawn on the skin incorporating the lesion and margins.  Incisions were then made along these lines to the appropriate tissue plane and the lesion was extirpated. Saucerization Excision Additional Text (Leave Blank If You Do Not Want): The margin was drawn around the clinically apparent lesion.  Incisions were then made along these lines, in a tangential fashion, to the appropriate tissue plane and the lesion was extirpated. Slit Excision Additional Text (Leave Blank If You Do Not Want): A linear line was drawn on the skin overlying the lesion. An incision was made slowly until the lesion was visualized.  Once visualized, the lesion was removed with blunt dissection. Excisional Biopsy Additional Text (Leave Blank If You Do Not Want): The margin was drawn around the clinically apparent lesion. An elliptical shape was then drawn on the skin incorporating the lesion and margins.  Incisions were then made along these lines to the appropriate tissue plane and the lesion was extirpated. Perilesional Excision Additional Text (Leave Blank If You Do Not Want): The margin was drawn around the clinically apparent lesion. Incisions were then made along these lines to the appropriate tissue plane and the lesion was extirpated. Repair Performed By Another Provider Text (Leave Blank If You Do Not Want): After the tissue was excised the defect was repaired by another provider. No Repair - Repaired With Adjacent Surgical Defect Text (Leave Blank If You Do Not Want): After the excision the defect was repaired concurrently with another surgical defect which was in close approximation. Advancement Flap (Single) Text: The defect edges were debeveled with a #15 scalpel blade.  Given the location of the defect and the proximity to free margins a single advancement flap was deemed most appropriate.  Using a sterile surgical marker, an appropriate advancement flap was drawn incorporating the defect and placing the expected incisions within the relaxed skin tension lines where possible.    The area thus outlined was incised deep to adipose tissue with a #15 scalpel blade.  The skin margins were undermined to an appropriate distance in all directions utilizing iris scissors. Advancement Flap (Double) Text: The defect edges were debeveled with a #15 scalpel blade.  Given the location of the defect and the proximity to free margins a double advancement flap was deemed most appropriate.  Using a sterile surgical marker, the appropriate advancement flaps were drawn incorporating the defect and placing the expected incisions within the relaxed skin tension lines where possible.    The area thus outlined was incised deep to adipose tissue with a #15 scalpel blade.  The skin margins were undermined to an appropriate distance in all directions utilizing iris scissors. Burow's Advancement Flap Text: The defect edges were debeveled with a #15 scalpel blade.  Given the location of the defect and the proximity to free margins a Burow's advancement flap was deemed most appropriate.  Using a sterile surgical marker, the appropriate advancement flap was drawn incorporating the defect and placing the expected incisions within the relaxed skin tension lines where possible.    The area thus outlined was incised deep to adipose tissue with a #15 scalpel blade.  The skin margins were undermined to an appropriate distance in all directions utilizing iris scissors. Chonodrocutaneous Helical Advancement Flap Text: The defect edges were debeveled with a #15 scalpel blade.  Given the location of the defect and the proximity to free margins a chondrocutaneous helical advancement flap was deemed most appropriate.  Using a sterile surgical marker, the appropriate advancement flap was drawn incorporating the defect and placing the expected incisions within the relaxed skin tension lines where possible.    The area thus outlined was incised deep to adipose tissue with a #15 scalpel blade.  The skin margins were undermined to an appropriate distance in all directions utilizing iris scissors. Crescentic Advancement Flap Text: The defect edges were debeveled with a #15 scalpel blade.  Given the location of the defect and the proximity to free margins a crescentic advancement flap was deemed most appropriate.  Using a sterile surgical marker, the appropriate advancement flap was drawn incorporating the defect and placing the expected incisions within the relaxed skin tension lines where possible.    The area thus outlined was incised deep to adipose tissue with a #15 scalpel blade.  The skin margins were undermined to an appropriate distance in all directions utilizing iris scissors. A-T Advancement Flap Text: The defect edges were debeveled with a #15 scalpel blade.  Given the location of the defect, shape of the defect and the proximity to free margins an A-T advancement flap was deemed most appropriate.  Using a sterile surgical marker, an appropriate advancement flap was drawn incorporating the defect and placing the expected incisions within the relaxed skin tension lines where possible.    The area thus outlined was incised deep to adipose tissue with a #15 scalpel blade.  The skin margins were undermined to an appropriate distance in all directions utilizing iris scissors. O-T Advancement Flap Text: The defect edges were debeveled with a #15 scalpel blade.  Given the location of the defect, shape of the defect and the proximity to free margins an O-T advancement flap was deemed most appropriate.  Using a sterile surgical marker, an appropriate advancement flap was drawn incorporating the defect and placing the expected incisions within the relaxed skin tension lines where possible.    The area thus outlined was incised deep to adipose tissue with a #15 scalpel blade.  The skin margins were undermined to an appropriate distance in all directions utilizing iris scissors. O-L Flap Text: The defect edges were debeveled with a #15 scalpel blade.  Given the location of the defect, shape of the defect and the proximity to free margins an O-L flap was deemed most appropriate.  Using a sterile surgical marker, an appropriate advancement flap was drawn incorporating the defect and placing the expected incisions within the relaxed skin tension lines where possible.    The area thus outlined was incised deep to adipose tissue with a #15 scalpel blade.  The skin margins were undermined to an appropriate distance in all directions utilizing iris scissors. O-Z Flap Text: The defect edges were debeveled with a #15 scalpel blade.  Given the location of the defect, shape of the defect and the proximity to free margins an O-Z flap was deemed most appropriate.  Using a sterile surgical marker, an appropriate transposition flap was drawn incorporating the defect and placing the expected incisions within the relaxed skin tension lines where possible. The area thus outlined was incised deep to adipose tissue with a #15 scalpel blade.  The skin margins were undermined to an appropriate distance in all directions utilizing iris scissors. Double O-Z Flap Text: The defect edges were debeveled with a #15 scalpel blade.  Given the location of the defect, shape of the defect and the proximity to free margins a Double O-Z flap was deemed most appropriate.  Using a sterile surgical marker, an appropriate transposition flap was drawn incorporating the defect and placing the expected incisions within the relaxed skin tension lines where possible. The area thus outlined was incised deep to adipose tissue with a #15 scalpel blade.  The skin margins were undermined to an appropriate distance in all directions utilizing iris scissors. V-Y Flap Text: The defect edges were debeveled with a #15 scalpel blade.  Given the location of the defect, shape of the defect and the proximity to free margins a V-Y flap was deemed most appropriate.  Using a sterile surgical marker, an appropriate advancement flap was drawn incorporating the defect and placing the expected incisions within the relaxed skin tension lines where possible.    The area thus outlined was incised deep to adipose tissue with a #15 scalpel blade.  The skin margins were undermined to an appropriate distance in all directions utilizing iris scissors. Advancement-Rotation Flap Text: The defect edges were debeveled with a #15 scalpel blade.  Given the location of the defect, shape of the defect and the proximity to free margins an advancement-rotation flap was deemed most appropriate.  Using a sterile surgical marker, an appropriate flap was drawn incorporating the defect and placing the expected incisions within the relaxed skin tension lines where possible. The area thus outlined was incised deep to adipose tissue with a #15 scalpel blade.  The skin margins were undermined to an appropriate distance in all directions utilizing iris scissors. Mercedes Flap Text: The defect edges were debeveled with a #15 scalpel blade.  Given the location of the defect, shape of the defect and the proximity to free margins a Mercedes flap was deemed most appropriate.  Using a sterile surgical marker, an appropriate advancement flap was drawn incorporating the defect and placing the expected incisions within the relaxed skin tension lines where possible. The area thus outlined was incised deep to adipose tissue with a #15 scalpel blade.  The skin margins were undermined to an appropriate distance in all directions utilizing iris scissors. Modified Advancement Flap Text: The defect edges were debeveled with a #15 scalpel blade.  Given the location of the defect, shape of the defect and the proximity to free margins a modified advancement flap was deemed most appropriate.  Using a sterile surgical marker, an appropriate advancement flap was drawn incorporating the defect and placing the expected incisions within the relaxed skin tension lines where possible.    The area thus outlined was incised deep to adipose tissue with a #15 scalpel blade.  The skin margins were undermined to an appropriate distance in all directions utilizing iris scissors. Mucosal Advancement Flap Text: Given the location of the defect, shape of the defect and the proximity to free margins a mucosal advancement flap was deemed most appropriate. Incisions were made with a 15 blade scalpel in the appropriate fashion along the cutaneous vermilion border and the mucosal lip. The remaining actinically damaged mucosal tissue was excised.  The mucosal advancement flap was then elevated to the gingival sulcus with care taken to preserve the neurovascular structures and advanced into the primary defect. Care was taken to ensure that precise realignment of the vermilion border was achieved. Hatchet Flap Text: The defect edges were debeveled with a #15 scalpel blade.  Given the location of the defect, shape of the defect and the proximity to free margins a hatchet flap was deemed most appropriate.  Using a sterile surgical marker, an appropriate hatchet flap was drawn incorporating the defect and placing the expected incisions within the relaxed skin tension lines where possible.    The area thus outlined was incised deep to adipose tissue with a #15 scalpel blade.  The skin margins were undermined to an appropriate distance in all directions utilizing iris scissors. Rotation Flap Text: The defect edges were debeveled with a #15 scalpel blade.  Given the location of the defect, shape of the defect and the proximity to free margins a rotation flap was deemed most appropriate.  Using a sterile surgical marker, an appropriate rotation flap was drawn incorporating the defect and placing the expected incisions within the relaxed skin tension lines where possible.    The area thus outlined was incised deep to adipose tissue with a #15 scalpel blade.  The skin margins were undermined to an appropriate distance in all directions utilizing iris scissors. Spiral Flap Text: The defect edges were debeveled with a #15 scalpel blade.  Given the location of the defect, shape of the defect and the proximity to free margins a spiral flap was deemed most appropriate.  Using a sterile surgical marker, an appropriate rotation flap was drawn incorporating the defect and placing the expected incisions within the relaxed skin tension lines where possible. The area thus outlined was incised deep to adipose tissue with a #15 scalpel blade.  The skin margins were undermined to an appropriate distance in all directions utilizing iris scissors. Star Wedge Flap Text: The defect edges were debeveled with a #15 scalpel blade.  Given the location of the defect, shape of the defect and the proximity to free margins a star wedge flap was deemed most appropriate.  Using a sterile surgical marker, an appropriate rotation flap was drawn incorporating the defect and placing the expected incisions within the relaxed skin tension lines where possible. The area thus outlined was incised deep to adipose tissue with a #15 scalpel blade.  The skin margins were undermined to an appropriate distance in all directions utilizing iris scissors. Transposition Flap Text: The defect edges were debeveled with a #15 scalpel blade.  Given the location of the defect and the proximity to free margins a transposition flap was deemed most appropriate.  Using a sterile surgical marker, an appropriate transposition flap was drawn incorporating the defect.    The area thus outlined was incised deep to adipose tissue with a #15 scalpel blade.  The skin margins were undermined to an appropriate distance in all directions utilizing iris scissors. Muscle Hinge Flap Text: The defect edges were debeveled with a #15 scalpel blade.  Given the size, depth and location of the defect and the proximity to free margins a muscle hinge flap was deemed most appropriate.  Using a sterile surgical marker, an appropriate hinge flap was drawn incorporating the defect. The area thus outlined was incised with a #15 scalpel blade.  The skin margins were undermined to an appropriate distance in all directions utilizing iris scissors. Melolabial Transposition Flap Text: The defect edges were debeveled with a #15 scalpel blade.  Given the location of the defect and the proximity to free margins a melolabial flap was deemed most appropriate.  Using a sterile surgical marker, an appropriate melolabial transposition flap was drawn incorporating the defect.    The area thus outlined was incised deep to adipose tissue with a #15 scalpel blade.  The skin margins were undermined to an appropriate distance in all directions utilizing iris scissors. Rhombic Flap Text: The defect edges were debeveled with a #15 scalpel blade.  Given the location of the defect and the proximity to free margins a rhombic flap was deemed most appropriate.  Using a sterile surgical marker, an appropriate rhombic flap was drawn incorporating the defect.    The area thus outlined was incised deep to adipose tissue with a #15 scalpel blade.  The skin margins were undermined to an appropriate distance in all directions utilizing iris scissors. Rhomboid Transposition Flap Text: The defect edges were debeveled with a #15 scalpel blade.  Given the location of the defect and the proximity to free margins a rhomboid transposition flap was deemed most appropriate.  Using a sterile surgical marker, an appropriate rhomboid flap was drawn incorporating the defect.    The area thus outlined was incised deep to adipose tissue with a #15 scalpel blade.  The skin margins were undermined to an appropriate distance in all directions utilizing iris scissors. Bi-Rhombic Flap Text: The defect edges were debeveled with a #15 scalpel blade.  Given the location of the defect and the proximity to free margins a bi-rhombic flap was deemed most appropriate.  Using a sterile surgical marker, an appropriate rhombic flap was drawn incorporating the defect. The area thus outlined was incised deep to adipose tissue with a #15 scalpel blade.  The skin margins were undermined to an appropriate distance in all directions utilizing iris scissors. Helical Rim Advancement Flap Text: The defect edges were debeveled with a #15 blade scalpel.  Given the location of the defect and the proximity to free margins (helical rim) a double helical rim advancement flap was deemed most appropriate.  Using a sterile surgical marker, the appropriate advancement flaps were drawn incorporating the defect and placing the expected incisions between the helical rim and antihelix where possible.  The area thus outlined was incised through and through with a #15 scalpel blade.  With a skin hook and iris scissors, the flaps were gently and sharply undermined and freed up. Bilateral Helical Rim Advancement Flap Text: The defect edges were debeveled with a #15 blade scalpel.  Given the location of the defect and the proximity to free margins (helical rim) a bilateral helical rim advancement flap was deemed most appropriate.  Using a sterile surgical marker, the appropriate advancement flaps were drawn incorporating the defect and placing the expected incisions between the helical rim and antihelix where possible.  The area thus outlined was incised through and through with a #15 scalpel blade.  With a skin hook and iris scissors, the flaps were gently and sharply undermined and freed up. Ear Star Wedge Flap Text: The defect edges were debeveled with a #15 blade scalpel.  Given the location of the defect and the proximity to free margins (helical rim) an ear star wedge flap was deemed most appropriate.  Using a sterile surgical marker, the appropriate flap was drawn incorporating the defect and placing the expected incisions between the helical rim and antihelix where possible.  The area thus outlined was incised through and through with a #15 scalpel blade. Banner Transposition Flap Text: The defect edges were debeveled with a #15 scalpel blade.  Given the location of the defect and the proximity to free margins a Banner transposition flap was deemed most appropriate.  Using a sterile surgical marker, an appropriate flap drawn around the defect. The area thus outlined was incised deep to adipose tissue with a #15 scalpel blade.  The skin margins were undermined to an appropriate distance in all directions utilizing iris scissors. Bilobed Flap Text: The defect edges were debeveled with a #15 scalpel blade.  Given the location of the defect and the proximity to free margins a bilobe flap was deemed most appropriate.  Using a sterile surgical marker, an appropriate bilobe flap drawn around the defect.    The area thus outlined was incised deep to adipose tissue with a #15 scalpel blade.  The skin margins were undermined to an appropriate distance in all directions utilizing iris scissors. Bilobed Transposition Flap Text: The defect edges were debeveled with a #15 scalpel blade.  Given the location of the defect and the proximity to free margins a bilobed transposition flap was deemed most appropriate.  Using a sterile surgical marker, an appropriate bilobe flap drawn around the defect.    The area thus outlined was incised deep to adipose tissue with a #15 scalpel blade.  The skin margins were undermined to an appropriate distance in all directions utilizing iris scissors. Trilobed Flap Text: The defect edges were debeveled with a #15 scalpel blade.  Given the location of the defect and the proximity to free margins a trilobed flap was deemed most appropriate.  Using a sterile surgical marker, an appropriate trilobed flap drawn around the defect.    The area thus outlined was incised deep to adipose tissue with a #15 scalpel blade.  The skin margins were undermined to an appropriate distance in all directions utilizing iris scissors. Dorsal Nasal Flap Text: The defect edges were debeveled with a #15 scalpel blade.  Given the location of the defect and the proximity to free margins a dorsal nasal flap was deemed most appropriate.  Using a sterile surgical marker, an appropriate dorsal nasal flap was drawn around the defect.    The area thus outlined was incised deep to adipose tissue with a #15 scalpel blade.  The skin margins were undermined to an appropriate distance in all directions utilizing iris scissors. Island Pedicle Flap Text: The defect edges were debeveled with a #15 scalpel blade.  Given the location of the defect, shape of the defect and the proximity to free margins an island pedicle advancement flap was deemed most appropriate.  Using a sterile surgical marker, an appropriate advancement flap was drawn incorporating the defect, outlining the appropriate donor tissue and placing the expected incisions within the relaxed skin tension lines where possible.    The area thus outlined was incised deep to adipose tissue with a #15 scalpel blade.  The skin margins were undermined to an appropriate distance in all directions around the primary defect and laterally outward around the island pedicle utilizing iris scissors.  There was minimal undermining beneath the pedicle flap. Island Pedicle Flap With Canthal Suspension Text: The defect edges were debeveled with a #15 scalpel blade.  Given the location of the defect, shape of the defect and the proximity to free margins an island pedicle advancement flap was deemed most appropriate.  Using a sterile surgical marker, an appropriate advancement flap was drawn incorporating the defect, outlining the appropriate donor tissue and placing the expected incisions within the relaxed skin tension lines where possible. The area thus outlined was incised deep to adipose tissue with a #15 scalpel blade.  The skin margins were undermined to an appropriate distance in all directions around the primary defect and laterally outward around the island pedicle utilizing iris scissors.  There was minimal undermining beneath the pedicle flap. A suspension suture was placed in the canthal tendon to prevent tension and prevent ectropion. Alar Island Pedicle Flap Text: The defect edges were debeveled with a #15 scalpel blade.  Given the location of the defect, shape of the defect and the proximity to the alar rim an island pedicle advancement flap was deemed most appropriate.  Using a sterile surgical marker, an appropriate advancement flap was drawn incorporating the defect, outlining the appropriate donor tissue and placing the expected incisions within the nasal ala running parallel to the alar rim. The area thus outlined was incised with a #15 scalpel blade.  The skin margins were undermined minimally to an appropriate distance in all directions around the primary defect and laterally outward around the island pedicle utilizing iris scissors.  There was minimal undermining beneath the pedicle flap. Double Island Pedicle Flap Text: The defect edges were debeveled with a #15 scalpel blade.  Given the location of the defect, shape of the defect and the proximity to free margins a double island pedicle advancement flap was deemed most appropriate.  Using a sterile surgical marker, an appropriate advancement flap was drawn incorporating the defect, outlining the appropriate donor tissue and placing the expected incisions within the relaxed skin tension lines where possible.    The area thus outlined was incised deep to adipose tissue with a #15 scalpel blade.  The skin margins were undermined to an appropriate distance in all directions around the primary defect and laterally outward around the island pedicle utilizing iris scissors.  There was minimal undermining beneath the pedicle flap. Island Pedicle Flap-Requiring Vessel Identification Text: The defect edges were debeveled with a #15 scalpel blade.  Given the location of the defect, shape of the defect and the proximity to free margins an island pedicle advancement flap was deemed most appropriate.  Using a sterile surgical marker, an appropriate advancement flap was drawn, based on the axial vessel mentioned above, incorporating the defect, outlining the appropriate donor tissue and placing the expected incisions within the relaxed skin tension lines where possible.    The area thus outlined was incised deep to adipose tissue with a #15 scalpel blade.  The skin margins were undermined to an appropriate distance in all directions around the primary defect and laterally outward around the island pedicle utilizing iris scissors.  There was minimal undermining beneath the pedicle flap. Keystone Flap Text: The defect edges were debeveled with a #15 scalpel blade.  Given the location of the defect, shape of the defect a keystone flap was deemed most appropriate.  Using a sterile surgical marker, an appropriate keystone flap was drawn incorporating the defect, outlining the appropriate donor tissue and placing the expected incisions within the relaxed skin tension lines where possible. The area thus outlined was incised deep to adipose tissue with a #15 scalpel blade.  The skin margins were undermined to an appropriate distance in all directions around the primary defect and laterally outward around the flap utilizing iris scissors. O-T Plasty Text: The defect edges were debeveled with a #15 scalpel blade.  Given the location of the defect, shape of the defect and the proximity to free margins an O-T plasty was deemed most appropriate.  Using a sterile surgical marker, an appropriate O-T plasty was drawn incorporating the defect and placing the expected incisions within the relaxed skin tension lines where possible.    The area thus outlined was incised deep to adipose tissue with a #15 scalpel blade.  The skin margins were undermined to an appropriate distance in all directions utilizing iris scissors. O-Z Plasty Text: The defect edges were debeveled with a #15 scalpel blade.  Given the location of the defect, shape of the defect and the proximity to free margins an O-Z plasty (double transposition flap) was deemed most appropriate.  Using a sterile surgical marker, the appropriate transposition flaps were drawn incorporating the defect and placing the expected incisions within the relaxed skin tension lines where possible.    The area thus outlined was incised deep to adipose tissue with a #15 scalpel blade.  The skin margins were undermined to an appropriate distance in all directions utilizing iris scissors.  Hemostasis was achieved with electrocautery.  The flaps were then transposed into place, one clockwise and the other counterclockwise, and anchored with interrupted buried subcutaneous sutures. Double O-Z Plasty Text: The defect edges were debeveled with a #15 scalpel blade.  Given the location of the defect, shape of the defect and the proximity to free margins a Double O-Z plasty (double transposition flap) was deemed most appropriate.  Using a sterile surgical marker, the appropriate transposition flaps were drawn incorporating the defect and placing the expected incisions within the relaxed skin tension lines where possible. The area thus outlined was incised deep to adipose tissue with a #15 scalpel blade.  The skin margins were undermined to an appropriate distance in all directions utilizing iris scissors.  Hemostasis was achieved with electrocautery.  The flaps were then transposed into place, one clockwise and the other counterclockwise, and anchored with interrupted buried subcutaneous sutures. S Plasty Text: Given the location and shape of the defect, and the orientation of relaxed skin tension lines, an S-plasty was deemed most appropriate for repair.  Using a sterile surgical marker, the appropriate outline of the S-plasty was drawn, incorporating the defect and placing the expected incisions within the relaxed skin tension lines where possible.  The area thus outlined was incised deep to adipose tissue with a #15 scalpel blade.  The skin margins were undermined to an appropriate distance in all directions utilizing iris scissors. The skin flaps were advanced over the defect.  The opposing margins were then approximated with interrupted buried subcutaneous sutures. V-Y Plasty Text: The defect edges were debeveled with a #15 scalpel blade.  Given the location of the defect, shape of the defect and the proximity to free margins an V-Y advancement flap was deemed most appropriate.  Using a sterile surgical marker, an appropriate advancement flap was drawn incorporating the defect and placing the expected incisions within the relaxed skin tension lines where possible.    The area thus outlined was incised deep to adipose tissue with a #15 scalpel blade.  The skin margins were undermined to an appropriate distance in all directions utilizing iris scissors. H Plasty Text: Given the location of the defect, shape of the defect and the proximity to free margins a H-plasty was deemed most appropriate for repair.  Using a sterile surgical marker, the appropriate advancement arms of the H-plasty were drawn incorporating the defect and placing the expected incisions within the relaxed skin tension lines where possible. The area thus outlined was incised deep to adipose tissue with a #15 scalpel blade. The skin margins were undermined to an appropriate distance in all directions utilizing iris scissors.  The opposing advancement arms were then advanced into place in opposite direction and anchored with interrupted buried subcutaneous sutures. W Plasty Text: The lesion was extirpated to the level of the fat with a #15 scalpel blade.  Given the location of the defect, shape of the defect and the proximity to free margins a W-plasty was deemed most appropriate for repair.  Using a sterile surgical marker, the appropriate transposition arms of the W-plasty were drawn incorporating the defect and placing the expected incisions within the relaxed skin tension lines where possible.    The area thus outlined was incised deep to adipose tissue with a #15 scalpel blade.  The skin margins were undermined to an appropriate distance in all directions utilizing iris scissors.  The opposing transposition arms were then transposed into place in opposite direction and anchored with interrupted buried subcutaneous sutures. Z Plasty Text: The lesion was extirpated to the level of the fat with a #15 scalpel blade.  Given the location of the defect, shape of the defect and the proximity to free margins a Z-plasty was deemed most appropriate for repair.  Using a sterile surgical marker, the appropriate transposition arms of the Z-plasty were drawn incorporating the defect and placing the expected incisions within the relaxed skin tension lines where possible.    The area thus outlined was incised deep to adipose tissue with a #15 scalpel blade.  The skin margins were undermined to an appropriate distance in all directions utilizing iris scissors.  The opposing transposition arms were then transposed into place in opposite direction and anchored with interrupted buried subcutaneous sutures. Cheek Interpolation Flap Text: A decision was made to reconstruct the defect utilizing an interpolation axial flap and a staged reconstruction.  A telfa template was made of the defect.  This telfa template was then used to outline the Cheek Interpolation flap.  The donor area for the pedicle flap was then injected with anesthesia.  The flap was excised through the skin and subcutaneous tissue down to the layer of the underlying musculature.  The interpolation flap was carefully excised within this deep plane to maintain its blood supply.  The edges of the donor site were undermined.   The donor site was closed in a primary fashion.  The pedicle was then rotated into position and sutured.  Once the tube was sutured into place, adequate blood supply was confirmed with blanching and refill.  The pedicle was then wrapped with xeroform gauze and dressed appropriately with a telfa and gauze bandage to ensure continued blood supply and protect the attached pedicle. Cheek-To-Nose Interpolation Flap Text: A decision was made to reconstruct the defect utilizing an interpolation axial flap and a staged reconstruction.  A telfa template was made of the defect.  This telfa template was then used to outline the Cheek-To-Nose Interpolation flap.  The donor area for the pedicle flap was then injected with anesthesia.  The flap was excised through the skin and subcutaneous tissue down to the layer of the underlying musculature.  The interpolation flap was carefully excised within this deep plane to maintain its blood supply.  The edges of the donor site were undermined.   The donor site was closed in a primary fashion.  The pedicle was then rotated into position and sutured.  Once the tube was sutured into place, adequate blood supply was confirmed with blanching and refill.  The pedicle was then wrapped with xeroform gauze and dressed appropriately with a telfa and gauze bandage to ensure continued blood supply and protect the attached pedicle. Interpolation Flap Text: A decision was made to reconstruct the defect utilizing an interpolation axial flap and a staged reconstruction.  A telfa template was made of the defect.  This telfa template was then used to outline the interpolation flap.  The donor area for the pedicle flap was then injected with anesthesia.  The flap was excised through the skin and subcutaneous tissue down to the layer of the underlying musculature.  The interpolation flap was carefully excised within this deep plane to maintain its blood supply.  The edges of the donor site were undermined.   The donor site was closed in a primary fashion.  The pedicle was then rotated into position and sutured.  Once the tube was sutured into place, adequate blood supply was confirmed with blanching and refill.  The pedicle was then wrapped with xeroform gauze and dressed appropriately with a telfa and gauze bandage to ensure continued blood supply and protect the attached pedicle. Melolabial Interpolation Flap Text: A decision was made to reconstruct the defect utilizing an interpolation axial flap and a staged reconstruction.  A telfa template was made of the defect.  This telfa template was then used to outline the melolabial interpolation flap.  The donor area for the pedicle flap was then injected with anesthesia.  The flap was excised through the skin and subcutaneous tissue down to the layer of the underlying musculature.  The pedicle flap was carefully excised within this deep plane to maintain its blood supply.  The edges of the donor site were undermined.   The donor site was closed in a primary fashion.  The pedicle was then rotated into position and sutured.  Once the tube was sutured into place, adequate blood supply was confirmed with blanching and refill.  The pedicle was then wrapped with xeroform gauze and dressed appropriately with a telfa and gauze bandage to ensure continued blood supply and protect the attached pedicle. Mastoid Interpolation Flap Text: A decision was made to reconstruct the defect utilizing an interpolation axial flap and a staged reconstruction.  A telfa template was made of the defect.  This telfa template was then used to outline the mastoid interpolation flap.  The donor area for the pedicle flap was then injected with anesthesia.  The flap was excised through the skin and subcutaneous tissue down to the layer of the underlying musculature.  The pedicle flap was carefully excised within this deep plane to maintain its blood supply.  The edges of the donor site were undermined.   The donor site was closed in a primary fashion.  The pedicle was then rotated into position and sutured.  Once the tube was sutured into place, adequate blood supply was confirmed with blanching and refill.  The pedicle was then wrapped with xeroform gauze and dressed appropriately with a telfa and gauze bandage to ensure continued blood supply and protect the attached pedicle. Posterior Auricular Interpolation Flap Text: A decision was made to reconstruct the defect utilizing an interpolation axial flap and a staged reconstruction.  A telfa template was made of the defect.  This telfa template was then used to outline the posterior auricular interpolation flap.  The donor area for the pedicle flap was then injected with anesthesia.  The flap was excised through the skin and subcutaneous tissue down to the layer of the underlying musculature.  The pedicle flap was carefully excised within this deep plane to maintain its blood supply.  The edges of the donor site were undermined.   The donor site was closed in a primary fashion.  The pedicle was then rotated into position and sutured.  Once the tube was sutured into place, adequate blood supply was confirmed with blanching and refill.  The pedicle was then wrapped with xeroform gauze and dressed appropriately with a telfa and gauze bandage to ensure continued blood supply and protect the attached pedicle. Paramedian Forehead Flap Text: A decision was made to reconstruct the defect utilizing an interpolation axial flap and a staged reconstruction.  A telfa template was made of the defect.  This telfa template was then used to outline the paramedian forehead pedicle flap.  The donor area for the pedicle flap was then injected with anesthesia.  The flap was excised through the skin and subcutaneous tissue down to the layer of the underlying musculature.  The pedicle flap was carefully excised within this deep plane to maintain its blood supply.  The edges of the donor site were undermined.   The donor site was closed in a primary fashion.  The pedicle was then rotated into position and sutured.  Once the tube was sutured into place, adequate blood supply was confirmed with blanching and refill.  The pedicle was then wrapped with xeroform gauze and dressed appropriately with a telfa and gauze bandage to ensure continued blood supply and protect the attached pedicle. Lip Wedge Excision Repair Text: Given the location of the defect and the proximity to free margins a full thickness wedge repair was deemed most appropriate.  Using a sterile surgical marker, the appropriate repair was drawn incorporating the defect and placing the expected incisions perpendicular to the vermilion border.  The vermilion border was also meticulously outlined to ensure appropriate reapproximation during the repair.  The area thus outlined was incised through and through with a #15 scalpel blade.  The muscularis and dermis were reaproximated with deep sutures following hemostasis. Care was taken to realign the vermilion border before proceeding with the superficial closure.  Once the vermilion was realigned the superfical and mucosal closure was finished. Ftsg Text: The defect edges were debeveled with a #15 scalpel blade.  Given the location of the defect, shape of the defect and the proximity to free margins a full thickness skin graft was deemed most appropriate.  Using a sterile surgical marker, the primary defect shape was transferred to the donor site. The area thus outlined was incised deep to adipose tissue with a #15 scalpel blade.  The harvested graft was then trimmed of adipose tissue until only dermis and epidermis was left.  The skin margins of the secondary defect were undermined to an appropriate distance in all directions utilizing iris scissors.  The secondary defect was closed with interrupted buried subcutaneous sutures.  The skin edges were then re-apposed with running  sutures.  The skin graft was then placed in the primary defect and oriented appropriately. Split-Thickness Skin Graft Text: The defect edges were debeveled with a #15 scalpel blade.  Given the location of the defect, shape of the defect and the proximity to free margins a split thickness skin graft was deemed most appropriate.  Using a sterile surgical marker, the primary defect shape was transferred to the donor site. The split thickness graft was then harvested.  The skin graft was then placed in the primary defect and oriented appropriately. Cartilage Graft Text: The defect edges were debeveled with a #15 scalpel blade.  Given the location of the defect, shape of the defect, the fact the defect involved a full thickness cartilage defect a cartilage graft was deemed most appropriate.  An appropriate donor site was identified, cleansed, and anesthetized. The cartilage graft was then harvested and transferred to the recipient site, oriented appropriately and then sutured into place.  The secondary defect was then repaired using a primary closure. Composite Graft Text: The defect edges were debeveled with a #15 scalpel blade.  Given the location of the defect, shape of the defect, the proximity to free margins and the fact the defect was full thickness a composite graft was deemed most appropriate.  The defect was outline and then transferred to the donor site.  A full thickness graft was then excised from the donor site. The graft was then placed in the primary defect, oriented appropriately and then sutured into place.  The secondary defect was then repaired using a primary closure. Epidermal Autograft Text: The defect edges were debeveled with a #15 scalpel blade.  Given the location of the defect, shape of the defect and the proximity to free margins an epidermal autograft was deemed most appropriate.  Using a sterile surgical marker, the primary defect shape was transferred to the donor site. The epidermal graft was then harvested.  The skin graft was then placed in the primary defect and oriented appropriately. Dermal Autograft Text: The defect edges were debeveled with a #15 scalpel blade.  Given the location of the defect, shape of the defect and the proximity to free margins a dermal autograft was deemed most appropriate.  Using a sterile surgical marker, the primary defect shape was transferred to the donor site. The area thus outlined was incised deep to adipose tissue with a #15 scalpel blade.  The harvested graft was then trimmed of adipose and epidermal tissue until only dermis was left.  The skin graft was then placed in the primary defect and oriented appropriately. Skin Substitute Text: The defect edges were debeveled with a #15 scalpel blade.  Given the location of the defect, shape of the defect and the proximity to free margins a skin substitute graft was deemed most appropriate.  The graft material was trimmed to fit the size of the defect. The graft was then placed in the primary defect and oriented appropriately. Tissue Cultured Epidermal Autograft Text: The defect edges were debeveled with a #15 scalpel blade.  Given the location of the defect, shape of the defect and the proximity to free margins a tissue cultured epidermal autograft was deemed most appropriate.  The graft was then trimmed to fit the size of the defect.  The graft was then placed in the primary defect and oriented appropriately. Xenograft Text: The defect edges were debeveled with a #15 scalpel blade.  Given the location of the defect, shape of the defect and the proximity to free margins a xenograft was deemed most appropriate.  The graft was then trimmed to fit the size of the defect.  The graft was then placed in the primary defect and oriented appropriately. Purse String (Intermediate) Text: Given the location of the defect and the characteristics of the surrounding skin a purse string intermediate closure was deemed most appropriate.  Undermining was performed circumfirentially around the surgical defect.  A purse string suture was then placed and tightened. Purse String (Simple) Text: Given the location of the defect and the characteristics of the surrounding skin a purse string simple closure was deemed most appropriate.  Undermining was performed circumferentially around the surgical defect.  A purse string suture was then placed and tightened. Partial Purse String (Intermediate) Text: Given the location of the defect and the characteristics of the surrounding skin an intermediate purse string closure was deemed most appropriate.  Undermining was performed circumferentially around the surgical defect.  A purse string suture was then placed and tightened. Wound tension of the circular defect prevented complete closure of the wound. Partial Purse String (Simple) Text: Given the location of the defect and the characteristics of the surrounding skin a simple purse string closure was deemed most appropriate.  Undermining was performed circumferentially around the surgical defect.  A purse string suture was then placed and tightened. Wound tension of the circular defect prevented complete closure of the wound. Complex Repair And Single Advancement Flap Text: The defect edges were debeveled with a #15 scalpel blade.  The primary defect was closed partially with a complex linear closure.  Given the location of the remaining defect, shape of the defect and the proximity to free margins a single advancement flap was deemed most appropriate for complete closure of the defect.  Using a sterile surgical marker, an appropriate advancement flap was drawn incorporating the defect and placing the expected incisions within the relaxed skin tension lines where possible.    The area thus outlined was incised deep to adipose tissue with a #15 scalpel blade.  The skin margins were undermined to an appropriate distance in all directions utilizing iris scissors. Complex Repair And Double Advancement Flap Text: The defect edges were debeveled with a #15 scalpel blade.  The primary defect was closed partially with a complex linear closure.  Given the location of the remaining defect, shape of the defect and the proximity to free margins a double advancement flap was deemed most appropriate for complete closure of the defect.  Using a sterile surgical marker, an appropriate advancement flap was drawn incorporating the defect and placing the expected incisions within the relaxed skin tension lines where possible.    The area thus outlined was incised deep to adipose tissue with a #15 scalpel blade.  The skin margins were undermined to an appropriate distance in all directions utilizing iris scissors. Complex Repair And Modified Advancement Flap Text: The defect edges were debeveled with a #15 scalpel blade.  The primary defect was closed partially with a complex linear closure.  Given the location of the remaining defect, shape of the defect and the proximity to free margins a modified advancement flap was deemed most appropriate for complete closure of the defect.  Using a sterile surgical marker, an appropriate advancement flap was drawn incorporating the defect and placing the expected incisions within the relaxed skin tension lines where possible.    The area thus outlined was incised deep to adipose tissue with a #15 scalpel blade.  The skin margins were undermined to an appropriate distance in all directions utilizing iris scissors. Complex Repair And A-T Advancement Flap Text: The defect edges were debeveled with a #15 scalpel blade.  The primary defect was closed partially with a complex linear closure.  Given the location of the remaining defect, shape of the defect and the proximity to free margins an A-T advancement flap was deemed most appropriate for complete closure of the defect.  Using a sterile surgical marker, an appropriate advancement flap was drawn incorporating the defect and placing the expected incisions within the relaxed skin tension lines where possible.    The area thus outlined was incised deep to adipose tissue with a #15 scalpel blade.  The skin margins were undermined to an appropriate distance in all directions utilizing iris scissors. Complex Repair And O-T Advancement Flap Text: The defect edges were debeveled with a #15 scalpel blade.  The primary defect was closed partially with a complex linear closure.  Given the location of the remaining defect, shape of the defect and the proximity to free margins an O-T advancement flap was deemed most appropriate for complete closure of the defect.  Using a sterile surgical marker, an appropriate advancement flap was drawn incorporating the defect and placing the expected incisions within the relaxed skin tension lines where possible.    The area thus outlined was incised deep to adipose tissue with a #15 scalpel blade.  The skin margins were undermined to an appropriate distance in all directions utilizing iris scissors. Complex Repair And O-L Flap Text: The defect edges were debeveled with a #15 scalpel blade.  The primary defect was closed partially with a complex linear closure.  Given the location of the remaining defect, shape of the defect and the proximity to free margins an O-L flap was deemed most appropriate for complete closure of the defect.  Using a sterile surgical marker, an appropriate flap was drawn incorporating the defect and placing the expected incisions within the relaxed skin tension lines where possible.    The area thus outlined was incised deep to adipose tissue with a #15 scalpel blade.  The skin margins were undermined to an appropriate distance in all directions utilizing iris scissors. Complex Repair And Bilobe Flap Text: The defect edges were debeveled with a #15 scalpel blade.  The primary defect was closed partially with a complex linear closure.  Given the location of the remaining defect, shape of the defect and the proximity to free margins a bilobe flap was deemed most appropriate for complete closure of the defect.  Using a sterile surgical marker, an appropriate advancement flap was drawn incorporating the defect and placing the expected incisions within the relaxed skin tension lines where possible.    The area thus outlined was incised deep to adipose tissue with a #15 scalpel blade.  The skin margins were undermined to an appropriate distance in all directions utilizing iris scissors. Complex Repair And Melolabial Flap Text: The defect edges were debeveled with a #15 scalpel blade.  The primary defect was closed partially with a complex linear closure.  Given the location of the remaining defect, shape of the defect and the proximity to free margins a melolabial flap was deemed most appropriate for complete closure of the defect.  Using a sterile surgical marker, an appropriate advancement flap was drawn incorporating the defect and placing the expected incisions within the relaxed skin tension lines where possible.    The area thus outlined was incised deep to adipose tissue with a #15 scalpel blade.  The skin margins were undermined to an appropriate distance in all directions utilizing iris scissors. Complex Repair And Rotation Flap Text: The defect edges were debeveled with a #15 scalpel blade.  The primary defect was closed partially with a complex linear closure.  Given the location of the remaining defect, shape of the defect and the proximity to free margins a rotation flap was deemed most appropriate for complete closure of the defect.  Using a sterile surgical marker, an appropriate advancement flap was drawn incorporating the defect and placing the expected incisions within the relaxed skin tension lines where possible.    The area thus outlined was incised deep to adipose tissue with a #15 scalpel blade.  The skin margins were undermined to an appropriate distance in all directions utilizing iris scissors. Complex Repair And Rhombic Flap Text: The defect edges were debeveled with a #15 scalpel blade.  The primary defect was closed partially with a complex linear closure.  Given the location of the remaining defect, shape of the defect and the proximity to free margins a rhombic flap was deemed most appropriate for complete closure of the defect.  Using a sterile surgical marker, an appropriate advancement flap was drawn incorporating the defect and placing the expected incisions within the relaxed skin tension lines where possible.    The area thus outlined was incised deep to adipose tissue with a #15 scalpel blade.  The skin margins were undermined to an appropriate distance in all directions utilizing iris scissors. Complex Repair And Transposition Flap Text: The defect edges were debeveled with a #15 scalpel blade.  The primary defect was closed partially with a complex linear closure.  Given the location of the remaining defect, shape of the defect and the proximity to free margins a transposition flap was deemed most appropriate for complete closure of the defect.  Using a sterile surgical marker, an appropriate advancement flap was drawn incorporating the defect and placing the expected incisions within the relaxed skin tension lines where possible.    The area thus outlined was incised deep to adipose tissue with a #15 scalpel blade.  The skin margins were undermined to an appropriate distance in all directions utilizing iris scissors. Complex Repair And V-Y Plasty Text: The defect edges were debeveled with a #15 scalpel blade.  The primary defect was closed partially with a complex linear closure.  Given the location of the remaining defect, shape of the defect and the proximity to free margins a V-Y plasty was deemed most appropriate for complete closure of the defect.  Using a sterile surgical marker, an appropriate advancement flap was drawn incorporating the defect and placing the expected incisions within the relaxed skin tension lines where possible.    The area thus outlined was incised deep to adipose tissue with a #15 scalpel blade.  The skin margins were undermined to an appropriate distance in all directions utilizing iris scissors. Complex Repair And M Plasty Text: The defect edges were debeveled with a #15 scalpel blade.  The primary defect was closed partially with a complex linear closure.  Given the location of the remaining defect, shape of the defect and the proximity to free margins an M plasty was deemed most appropriate for complete closure of the defect.  Using a sterile surgical marker, an appropriate advancement flap was drawn incorporating the defect and placing the expected incisions within the relaxed skin tension lines where possible.    The area thus outlined was incised deep to adipose tissue with a #15 scalpel blade.  The skin margins were undermined to an appropriate distance in all directions utilizing iris scissors. Complex Repair And Double M Plasty Text: The defect edges were debeveled with a #15 scalpel blade.  The primary defect was closed partially with a complex linear closure.  Given the location of the remaining defect, shape of the defect and the proximity to free margins a double M plasty was deemed most appropriate for complete closure of the defect.  Using a sterile surgical marker, an appropriate advancement flap was drawn incorporating the defect and placing the expected incisions within the relaxed skin tension lines where possible.    The area thus outlined was incised deep to adipose tissue with a #15 scalpel blade.  The skin margins were undermined to an appropriate distance in all directions utilizing iris scissors. Complex Repair And W Plasty Text: The defect edges were debeveled with a #15 scalpel blade.  The primary defect was closed partially with a complex linear closure.  Given the location of the remaining defect, shape of the defect and the proximity to free margins a W plasty was deemed most appropriate for complete closure of the defect.  Using a sterile surgical marker, an appropriate advancement flap was drawn incorporating the defect and placing the expected incisions within the relaxed skin tension lines where possible.    The area thus outlined was incised deep to adipose tissue with a #15 scalpel blade.  The skin margins were undermined to an appropriate distance in all directions utilizing iris scissors. Complex Repair And Z Plasty Text: The defect edges were debeveled with a #15 scalpel blade.  The primary defect was closed partially with a complex linear closure.  Given the location of the remaining defect, shape of the defect and the proximity to free margins a Z plasty was deemed most appropriate for complete closure of the defect.  Using a sterile surgical marker, an appropriate advancement flap was drawn incorporating the defect and placing the expected incisions within the relaxed skin tension lines where possible.    The area thus outlined was incised deep to adipose tissue with a #15 scalpel blade.  The skin margins were undermined to an appropriate distance in all directions utilizing iris scissors. Complex Repair And Dorsal Nasal Flap Text: The defect edges were debeveled with a #15 scalpel blade.  The primary defect was closed partially with a complex linear closure.  Given the location of the remaining defect, shape of the defect and the proximity to free margins a dorsal nasal flap was deemed most appropriate for complete closure of the defect.  Using a sterile surgical marker, an appropriate flap was drawn incorporating the defect and placing the expected incisions within the relaxed skin tension lines where possible.    The area thus outlined was incised deep to adipose tissue with a #15 scalpel blade.  The skin margins were undermined to an appropriate distance in all directions utilizing iris scissors. Complex Repair And Ftsg Text: The defect edges were debeveled with a #15 scalpel blade.  The primary defect was closed partially with a complex linear closure.  Given the location of the defect, shape of the defect and the proximity to free margins a full thickness skin graft was deemed most appropriate to repair the remaining defect.  The graft was trimmed to fit the size of the remaining defect.  The graft was then placed in the primary defect, oriented appropriately, and sutured into place. Complex Repair And Split-Thickness Skin Graft Text: The defect edges were debeveled with a #15 scalpel blade.  The primary defect was closed partially with a complex linear closure.  Given the location of the defect, shape of the defect and the proximity to free margins a split thickness skin graft was deemed most appropriate to repair the remaining defect.  The graft was trimmed to fit the size of the remaining defect.  The graft was then placed in the primary defect, oriented appropriately, and sutured into place. Complex Repair And Epidermal Autograft Text: The defect edges were debeveled with a #15 scalpel blade.  The primary defect was closed partially with a complex linear closure.  Given the location of the defect, shape of the defect and the proximity to free margins an epidermal autograft was deemed most appropriate to repair the remaining defect.  The graft was trimmed to fit the size of the remaining defect.  The graft was then placed in the primary defect, oriented appropriately, and sutured into place. Complex Repair And Dermal Autograft Text: The defect edges were debeveled with a #15 scalpel blade.  The primary defect was closed partially with a complex linear closure.  Given the location of the defect, shape of the defect and the proximity to free margins an dermal autograft was deemed most appropriate to repair the remaining defect.  The graft was trimmed to fit the size of the remaining defect.  The graft was then placed in the primary defect, oriented appropriately, and sutured into place. Complex Repair And Tissue Cultured Epidermal Autograft Text: The defect edges were debeveled with a #15 scalpel blade.  The primary defect was closed partially with a complex linear closure.  Given the location of the defect, shape of the defect and the proximity to free margins an tissue cultured epidermal autograft was deemed most appropriate to repair the remaining defect.  The graft was trimmed to fit the size of the remaining defect.  The graft was then placed in the primary defect, oriented appropriately, and sutured into place. Complex Repair And Xenograft Text: The defect edges were debeveled with a #15 scalpel blade.  The primary defect was closed partially with a complex linear closure.  Given the location of the defect, shape of the defect and the proximity to free margins a xenograft was deemed most appropriate to repair the remaining defect.  The graft was trimmed to fit the size of the remaining defect.  The graft was then placed in the primary defect, oriented appropriately, and sutured into place. Complex Repair And Skin Substitute Graft Text: The defect edges were debeveled with a #15 scalpel blade.  The primary defect was closed partially with a complex linear closure.  Given the location of the remaining defect, shape of the defect and the proximity to free margins a skin substitute graft was deemed most appropriate to repair the remaining defect.  The graft was trimmed to fit the size of the remaining defect.  The graft was then placed in the primary defect, oriented appropriately, and sutured into place. Path Notes (To The Dermatopathologist): Please check margins. Tagged medially Consent was obtained from the patient. The risks and benefits to therapy were discussed in detail. Specifically, the risks of infection, scarring, bleeding, prolonged wound healing, incomplete removal, allergy to anesthesia, nerve injury and recurrence were addressed. Prior to the procedure, the treatment site was clearly identified and confirmed by the patient. All components of Universal Protocol/PAUSE Rule completed. Post-Care Instructions: I reviewed with the patient in detail post-care instructions. Patient is not to engage in any heavy lifting, exercise, or swimming for the next 14 days. Should the patient develop any fevers, chills, bleeding, severe pain patient will contact the office immediately. Home Suture Removal Text: Patient was provided a home suture removal kit and will remove their sutures at home.  If they have any questions or difficulties they will call the office. Where Do You Want The Question To Include Opioid Counseling Located?: Case Summary Tab Information: Selecting Yes will display possible errors in your note based on the variables you have selected. This validation is only offered as a suggestion for you. PLEASE NOTE THAT THE VALIDATION TEXT WILL BE REMOVED WHEN YOU FINALIZE YOUR NOTE. IF YOU WANT TO FAX A PRELIMINARY NOTE YOU WILL NEED TO TOGGLE THIS TO 'NO' IF YOU DO NOT WANT IT IN YOUR FAXED NOTE.

## 2020-04-03 ASSESSMENT — ENCOUNTER SYMPTOMS
POSTURAL DYSPNEA: 1
EXERCISE INTOLERANCE: 1
HYPERTENSION: 0
COUGH DISTURBING SLEEP: 1
SYNCOPE: 0
LIGHT-HEADEDNESS: 0
DYSPNEA ON EXERTION: 1
WHEEZING: 1
ORTHOPNEA: 1
COUGH: 1
SNORES LOUDLY: 0
SLEEP DISTURBANCES DUE TO BREATHING: 0
SPUTUM PRODUCTION: 1
HEMOPTYSIS: 0
LEG PAIN: 0
SHORTNESS OF BREATH: 1
HYPOTENSION: 0
PALPITATIONS: 1

## 2020-04-09 ENCOUNTER — TELEPHONE (OUTPATIENT)
Dept: PULMONOLOGY | Facility: CLINIC | Age: 35
End: 2020-04-09

## 2020-04-09 ENCOUNTER — VIRTUAL VISIT (OUTPATIENT)
Dept: PULMONOLOGY | Facility: CLINIC | Age: 35
End: 2020-04-09
Payer: COMMERCIAL

## 2020-04-09 DIAGNOSIS — J45.50 SEVERE PERSISTENT ASTHMA, UNSPECIFIED WHETHER COMPLICATED (H): Primary | ICD-10-CM

## 2020-04-09 DIAGNOSIS — J82.83 EOSINOPHILIC ASTHMA: ICD-10-CM

## 2020-04-09 NOTE — TELEPHONE ENCOUNTER
Contacted by Dr. Lemos to get pt set up on Nucala. Sent Knowta message to pt with update and teaching materials.

## 2020-04-09 NOTE — PROGRESS NOTES
"Michelle Epstein is a 34 year old female who is being evaluated via a billable telephone visit.      The patient has been notified of following:     \"This telephone visit will be conducted via a call between you and your physician/provider. We have found that certain health care needs can be provided without the need for a physical exam.  This service lets us provide the care you need with a short phone conversation.  If a prescription is necessary we can send it directly to your pharmacy.  If lab work is needed we can place an order for that and you can then stop by our lab to have the test done at a later time.    Telephone visits are billed at different rates depending on your insurance coverage. During this emergency period, for some insurers they may be billed the same as an in-person visit.  Please reach out to your insurance provider with any questions.    If during the course of the call the physician/provider feels a telephone visit is not appropriate, you will not be charged for this service.\"    Patient has given verbal consent for Telephone visit?  Yes    Michelle Epstein complains of shortness of breath    I have reviewed and updated the patient's Past Medical History, Social History, Family History and Medication List.    ALLERGIES  Aspirin; Nsaids; and Aspirin    Additional provider notes: This is a 34-year-old female with a past medical history of bronchiectasis.  This is a telephone visit that was conducted due to the covid19 pandemic.    She has had breathing issues ever since she was 20 years old.  At that time she was getting a lot of antibiotics and prednisone without a particular diagnosis.  She is finally seen by Dr. Odell is here at the [facility] and was diagnosed with bronchiectasis.  She has been treated with airway clearance mechanisms, nebulizers and bronchodilators help out with her symptoms.  Unfortunately she has been having a lot of exacerbations.  She continues to get rounds of " prednisone and antibiotics in order to help out her symptoms, and this is been recurring more frequently.  At her last visit, a CBC was checked and her eosinophils were elevated at 1400.  Her IgE was also slightly elevated.    She has cough, chest tightness, wheezing, and dyspnea with exertion.  Her dyspnea she thinks is steadily getting worse over time.  She also has sinus congestion, and has had sinus surgeries on 3 occasions in the past.  She has known polyps.  She has gastroesophageal reflux disease and is currently on Prilosec.  Whenever she gets prednisone, her symptoms melt away rather quickly.  She does get relief with bronchodilators.  She always has a scratchy feeling in her lungs.    Past medical history: Childhood exercise-induced asthma, bronchiectasis, increased triglycerides, anxiety, depression  Past surgical history: 3 sinus surgeries, kidney ureteral reimplantation, bronchoscopy  Family history: Mom with COPD, grandma with asthma  Social history: Works in an acute rehab unit    Review of her labs her eosinophil count on February 27, 2020 was 1400.  Her IgE level is elevated at 171    Assessment and plan:  Her symptoms, and variability in her spirometry over time as well as her response to bronchodilators and prednisone are all consistent with the diagnosis of asthma.  It is possible for people to have asthma and bronchiectasis at the same time.  She is currently on a nebulized steroid and Xopenex.  She has not been able to take traditional beta agonists due to the development of a supraventricular tachycardia.    Her laboratory data is consistent with a T2 high phenotype of asthma.  These patients can sometimes benefit from anti-eosinophil therapies.  Given her frequent exacerbations and persistent lung symptoms as well as sinus symptoms, I do think it is worthwhile to pursue 1 of these.    I talked to her about the various different therapies.  In the end we decided to try mepolizumab, and give  this a 3 to 6-month trial.  And make sure that it was working.  We will  if it is working based on her need for oral corticosteroids.    Phone call duration: 30 minutes    Christian Lemos MD    Answers for HPI/ROS submitted by the patient on 4/3/2020   General Symptoms: No  Skin Symptoms: No  HENT Symptoms: No  EYE SYMPTOMS: No  HEART SYMPTOMS: Yes  LUNG SYMPTOMS: Yes  INTESTINAL SYMPTOMS: No  URINARY SYMPTOMS: No  GYNECOLOGIC SYMPTOMS: No  BREAST SYMPTOMS: No  SKELETAL SYMPTOMS: No  BLOOD SYMPTOMS: No  NERVOUS SYSTEM SYMPTOMS: No  MENTAL HEALTH SYMPTOMS: No  Cough: Yes  Sputum or phlegm: Yes  Coughing up blood: No  Difficulty breating or shortness of breath: Yes  Snoring: No  Wheezing: Yes  Difficulty breathing on exertion: Yes  Nighttime Cough: Yes  Difficulty breathing when lying flat: Yes  Chest pain or pressure: No  Fast or irregular heartbeat: Yes  Pain in legs with walking: No  Trouble breathing while lying down: Yes  Fingers or toes appear blue: No  High blood pressure: No  Low blood pressure: No  Fainting: No  Murmurs: No  Pacemaker: No  Varicose veins: No  Edema or swelling: No  Wake up at night with shortness of breath: No  Light-headedness: No  Exercise intolerance: Yes

## 2020-04-10 ENCOUNTER — TELEPHONE (OUTPATIENT)
Facility: CLINIC | Age: 35
End: 2020-04-10

## 2020-04-10 NOTE — TELEPHONE ENCOUNTER
Prior Authorization Approval    Authorization Effective Date: 3/11/2020  Authorization Expiration Date: 10/7/2020  Medication: Nucala 100MG/ML Auto-Injector ([place] APPR[place])  Approved Dose/Quantity: 1ML/28 days  Reference #:     Insurance Company: [employer] - Phone [phone] Fax [phone]  Expected CoPay: $25     CoPay Card Available: No    Foundation Assistance Needed:    Which Pharmacy is filling the prescription (Not needed for infusion/clinic administered): [facility] - [place], [place] - [address]  Pharmacy Notified: Yes  Patient Notified: Yes

## 2020-04-13 DIAGNOSIS — K21.9 GASTROESOPHAGEAL REFLUX DISEASE WITHOUT ESOPHAGITIS: ICD-10-CM

## 2020-04-14 ENCOUNTER — MYC REFILL (OUTPATIENT)
Dept: FAMILY MEDICINE | Facility: CLINIC | Age: 35
End: 2020-04-14

## 2020-04-14 DIAGNOSIS — M62.830 BACK MUSCLE SPASM: ICD-10-CM

## 2020-04-15 NOTE — TELEPHONE ENCOUNTER
Requested Prescriptions   Pending Prescriptions Disp Refills     methocarbamol (ROBAXIN) 750 MG tablet 20 tablet 0     Sig: Take 1 tablet (750 mg) by mouth 3 times daily as needed for muscle spasms       There is no refill protocol information for this order        methocarbamol (ROBAXIN) 750 MG tablet  Last Written Prescription Date:  1/21/2020  Last Fill Quantity: 20,  # refills: 0   Last office visit: 6/18/2019 with prescribing provider:  Mindy Fink   Future Office Visit:

## 2020-04-17 RX ORDER — METHOCARBAMOL 750 MG/1
750 TABLET, FILM COATED ORAL 3 TIMES DAILY PRN
Qty: 20 TABLET | Refills: 0 | Status: SHIPPED | OUTPATIENT
Start: 2020-04-17 | End: 2020-05-21

## 2020-04-17 NOTE — TELEPHONE ENCOUNTER
Routing refill request to provider for review/approval because:  Drug not on the [facility] refill protocol     Manny Lara RN, BSN, PHN

## 2020-04-20 ENCOUNTER — TELEPHONE (OUTPATIENT)
Dept: PULMONOLOGY | Facility: CLINIC | Age: 35
End: 2020-04-20

## 2020-04-20 NOTE — TELEPHONE ENCOUNTER
Followed up with Michelle Wills.  Pt states she has received medication and had administered first dose without issue.  Denies questions or concerns at this time.

## 2020-05-14 ENCOUNTER — MYC REFILL (OUTPATIENT)
Dept: FAMILY MEDICINE | Facility: CLINIC | Age: 35
End: 2020-05-14

## 2020-05-14 DIAGNOSIS — E78.1 HYPERTRIGLYCERIDEMIA: ICD-10-CM

## 2020-05-18 NOTE — TELEPHONE ENCOUNTER
Routing refill request to provider for review/approval because:  Sowmya given x1 and patient did not follow up, please advise    Magaly Wilson RN

## 2020-05-19 RX ORDER — FENOFIBRATE 54 MG/1
54 TABLET ORAL DAILY
Qty: 30 TABLET | Refills: 0 | Status: SHIPPED | OUTPATIENT
Start: 2020-05-19 | End: 2020-07-07

## 2020-05-19 NOTE — TELEPHONE ENCOUNTER
Given one month- please assist with scheduling follow up with Mindy and fasting labs- no further refills without follow up with her

## 2020-05-20 DIAGNOSIS — J47.9 BRONCHIECTASIS WITHOUT COMPLICATION (H): ICD-10-CM

## 2020-05-21 DIAGNOSIS — M62.830 BACK MUSCLE SPASM: ICD-10-CM

## 2020-05-21 RX ORDER — METHOCARBAMOL 750 MG/1
750 TABLET, FILM COATED ORAL 3 TIMES DAILY PRN
Qty: 20 TABLET | Refills: 0 | Status: SHIPPED | OUTPATIENT
Start: 2020-05-21 | End: 2020-07-13

## 2020-05-21 RX ORDER — AZITHROMYCIN 250 MG/1
TABLET, FILM COATED ORAL
Qty: 30 TABLET | Refills: 11 | Status: SHIPPED | OUTPATIENT
Start: 2020-05-21 | End: 2021-07-19

## 2020-05-21 NOTE — TELEPHONE ENCOUNTER
Routing refill request to provider for review/approval because:  Drug not on the [facility] refill protocol   Magaly Wilson RN

## 2020-05-21 NOTE — TELEPHONE ENCOUNTER
Requested Prescriptions   Pending Prescriptions Disp Refills     methocarbamol (ROBAXIN) 750 MG tablet 20 tablet 0     Sig: Take 1 tablet (750 mg) by mouth 3 times daily as needed for muscle spasms       There is no refill protocol information for this order        methocarbamol (ROBAXIN) 750 MG tablet  Last Written Prescription Date:  4/17/2020  Last Fill Quantity: 20,  # refills: 0   Last office visit: 6/18/2019 with prescribing provider:  Renita Heck   Future Office Visit:

## 2020-06-02 ENCOUNTER — MYC MEDICAL ADVICE (OUTPATIENT)
Dept: PULMONOLOGY | Facility: CLINIC | Age: 35
End: 2020-06-02

## 2020-06-02 DIAGNOSIS — J45.21 MILD INTERMITTENT ASTHMA WITH EXACERBATION: Primary | ICD-10-CM

## 2020-06-02 RX ORDER — PREDNISONE 20 MG/1
40 TABLET ORAL DAILY
Qty: 10 TABLET | Refills: 0 | Status: SHIPPED | OUTPATIENT
Start: 2020-06-02 | End: 2020-06-18

## 2020-06-02 NOTE — TELEPHONE ENCOUNTER
Plan:  Rx for prednisone 40 mg X 5 days sent to pt's pharmacy.  Advised that Nucala dose cannot be increased.

## 2020-06-18 DIAGNOSIS — J45.51 SEVERE PERSISTENT ASTHMA WITH EXACERBATION (H): Primary | ICD-10-CM

## 2020-06-18 RX ORDER — PREDNISONE 20 MG/1
40 TABLET ORAL DAILY
Qty: 10 TABLET | Refills: 0 | Status: SHIPPED | OUTPATIENT
Start: 2020-06-18 | End: 2020-07-28

## 2020-06-18 NOTE — PROGRESS NOTES
More cough and wheezing.  This is the 2nd time she has asked for prednisone since starting nucala.  I will give her another 40 mg for 5 days. Ideally, we should give nucala about 6 months before we decide to try something else.  She does say that overall her symptoms are better with it.     Rico Lemos MD

## 2020-07-09 ENCOUNTER — MYC REFILL (OUTPATIENT)
Dept: FAMILY MEDICINE | Facility: CLINIC | Age: 35
End: 2020-07-09

## 2020-07-09 ENCOUNTER — VIRTUAL VISIT (OUTPATIENT)
Dept: CARDIOLOGY | Facility: CLINIC | Age: 35
End: 2020-07-09
Attending: INTERNAL MEDICINE
Payer: COMMERCIAL

## 2020-07-09 DIAGNOSIS — E78.1 HYPERTRIGLYCERIDEMIA: ICD-10-CM

## 2020-07-09 DIAGNOSIS — E04.1 THYROID NODULE: ICD-10-CM

## 2020-07-09 DIAGNOSIS — I47.10 SVT (SUPRAVENTRICULAR TACHYCARDIA) (H): Primary | ICD-10-CM

## 2020-07-09 LAB
ALBUMIN SERPL-MCNC: 3.5 G/DL (ref 3.4–5)
ALP SERPL-CCNC: 22 U/L (ref 40–150)
ALT SERPL W P-5'-P-CCNC: 21 U/L (ref 0–50)
ANION GAP SERPL CALCULATED.3IONS-SCNC: 7 MMOL/L (ref 3–14)
AST SERPL W P-5'-P-CCNC: 12 U/L (ref 0–45)
BILIRUB SERPL-MCNC: 0.4 MG/DL (ref 0.2–1.3)
BUN SERPL-MCNC: 14 MG/DL (ref 7–30)
CALCIUM SERPL-MCNC: 8.7 MG/DL (ref 8.5–10.1)
CHLORIDE SERPL-SCNC: 107 MMOL/L (ref 94–109)
CHOLEST SERPL-MCNC: 184 MG/DL
CO2 SERPL-SCNC: 27 MMOL/L (ref 20–32)
CREAT SERPL-MCNC: 1.12 MG/DL (ref 0.52–1.04)
GFR SERPL CREATININE-BSD FRML MDRD: 63 ML/MIN/{1.73_M2}
GLUCOSE SERPL-MCNC: 74 MG/DL (ref 70–99)
HDLC SERPL-MCNC: 71 MG/DL
LDLC SERPL CALC-MCNC: 73 MG/DL
NONHDLC SERPL-MCNC: 113 MG/DL
POTASSIUM SERPL-SCNC: 3.9 MMOL/L (ref 3.4–5.3)
PROT SERPL-MCNC: 6.9 G/DL (ref 6.8–8.8)
SODIUM SERPL-SCNC: 141 MMOL/L (ref 133–144)
TRIGL SERPL-MCNC: 199 MG/DL
TSH SERPL DL<=0.005 MIU/L-ACNC: 3.87 MU/L (ref 0.4–4)

## 2020-07-09 PROCEDURE — 80053 COMPREHEN METABOLIC PANEL: CPT | Performed by: NURSE PRACTITIONER

## 2020-07-09 PROCEDURE — 99214 OFFICE O/P EST MOD 30 MIN: CPT | Mod: 95 | Performed by: INTERNAL MEDICINE

## 2020-07-09 PROCEDURE — 36415 COLL VENOUS BLD VENIPUNCTURE: CPT | Performed by: NURSE PRACTITIONER

## 2020-07-09 PROCEDURE — 84443 ASSAY THYROID STIM HORMONE: CPT | Performed by: NURSE PRACTITIONER

## 2020-07-09 PROCEDURE — 80061 LIPID PANEL: CPT | Performed by: NURSE PRACTITIONER

## 2020-07-09 ASSESSMENT — PAIN SCALES - GENERAL: PAINLEVEL: NO PAIN (0)
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
## 2020-07-09 NOTE — PATIENT INSTRUCTIONS
"You were evaluated today in the Cardiovascular Telemedicine Clinic at the [facility].     Cardiology Provider during your visit: Dr. Fely Denise    Diagnosis: supraventricular tachycardia    Results: discussed with patient    Orders:   None    Medication Changes:   None    Recommendations:   We will schedule EP study / ablation with anesthesia support    Follow-up:   After ablation    Please follow up with primary care provider for medication refills         Please feel free to call me with any questions or concerns.       Melissa Scott RN     Questions and [phone].   First press #1 for the [employer] and then press #3 for \"Medical Questions\" to reach us Cardiology Nurses.      On Call Cardiologist for after hours or on weekends: [phone]   option #4 and ask to speak to the on-call Cardiologist.          If you need a medication refill please contact your pharmacy.  Please allow 3 business days for your refill to be completed.   "

## 2020-07-09 NOTE — RESULT ENCOUNTER NOTE
Hali Guillen is out of the office and I am reviewing your results.   Your thyroid function was normal.   Your electrolytes, blood sugar, kidney function and liver function were normal.    Your cholesterol looks ok.  Your triglycerides were slightly above normal but much improved when compared to last year.  Continue medications as you have been taking.    Please call or MyChart my office with any questions or concerns.    Renita Heck, PAC

## 2020-07-10 ENCOUNTER — E-VISIT (OUTPATIENT)
Dept: FAMILY MEDICINE | Facility: CLINIC | Age: 35
End: 2020-07-10
Payer: COMMERCIAL

## 2020-07-10 DIAGNOSIS — E78.1 HYPERTRIGLYCERIDEMIA: Primary | ICD-10-CM

## 2020-07-10 PROCEDURE — 99207 ZZC NO BILLABLE SERVICE THIS VISIT: CPT | Performed by: NURSE PRACTITIONER

## 2020-07-10 RX ORDER — FENOFIBRATE 54 MG/1
54 TABLET ORAL DAILY
Qty: 30 TABLET | Refills: 3 | Status: SHIPPED | OUTPATIENT
Start: 2020-07-10 | End: 2020-09-02

## 2020-07-10 RX ORDER — FENOFIBRATE 54 MG/1
54 TABLET ORAL DAILY
Qty: 30 TABLET | Refills: 0 | OUTPATIENT
Start: 2020-07-10

## 2020-07-13 ENCOUNTER — CARE COORDINATION (OUTPATIENT)
Dept: CARDIOLOGY | Facility: CLINIC | Age: 35
End: 2020-07-13

## 2020-07-13 ENCOUNTER — HOSPITAL ENCOUNTER (OUTPATIENT)
Facility: CLINIC | Age: 35
End: 2020-07-13
Attending: INTERNAL MEDICINE | Admitting: INTERNAL MEDICINE
Payer: COMMERCIAL

## 2020-07-13 DIAGNOSIS — I47.10 PAROXYSMAL SUPRAVENTRICULAR TACHYCARDIA (H): Primary | ICD-10-CM

## 2020-07-13 DIAGNOSIS — I47.10 SVT (SUPRAVENTRICULAR TACHYCARDIA) (H): ICD-10-CM

## 2020-07-13 DIAGNOSIS — I47.10 PAROXYSMAL SUPRAVENTRICULAR TACHYCARDIA (H): ICD-10-CM

## 2020-07-13 RX ORDER — LIDOCAINE 40 MG/G
CREAM TOPICAL
Status: CANCELLED | OUTPATIENT
Start: 2020-07-13

## 2020-07-13 RX ORDER — FENOFIBRATE 54 MG/1
54 TABLET ORAL DAILY
Qty: 30 TABLET | Refills: 0 | OUTPATIENT
Start: 2020-07-13

## 2020-07-13 RX ORDER — SODIUM CHLORIDE 9 MG/ML
INJECTION, SOLUTION INTRAVENOUS CONTINUOUS
Status: CANCELLED | OUTPATIENT
Start: 2020-07-13

## 2020-07-16 ENCOUNTER — E-VISIT (OUTPATIENT)
Dept: FAMILY MEDICINE | Facility: CLINIC | Age: 35
End: 2020-07-16
Payer: COMMERCIAL

## 2020-07-16 DIAGNOSIS — H66.92 LEFT OTITIS MEDIA, UNSPECIFIED OTITIS MEDIA TYPE: Primary | ICD-10-CM

## 2020-07-16 DIAGNOSIS — H66.90 EAR INFECTION: ICD-10-CM

## 2020-07-16 PROCEDURE — 99421 OL DIG E/M SVC 5-10 MIN: CPT | Performed by: NURSE PRACTITIONER

## 2020-07-16 RX ORDER — OFLOXACIN 3 MG/ML
5 SOLUTION AURICULAR (OTIC) 2 TIMES DAILY
Qty: 3 ML | Refills: 0 | Status: SHIPPED | OUTPATIENT
Start: 2020-07-16 | End: 2020-07-21

## 2020-07-16 RX ORDER — FLUOCINOLONE ACETONIDE 0.11 MG/ML
OIL TOPICAL
Qty: 50 ML | Refills: 0 | Status: SHIPPED | OUTPATIENT
Start: 2020-07-16

## 2020-07-16 NOTE — PATIENT INSTRUCTIONS
Thank you for choosing us for your care. I have placed an order for a prescription so that you can start treatment. View your full visit summary for details by clicking on the link below. Your pharmacist will able to address any questions you may have about the medication.     If you're not feeling better within 5-7 days, please schedule an appointment.  You can schedule an appointment right here in [facility], or call [phone]  If the visit is for the same symptoms as your e-visit, we'll refund the cost of your e-visit if seen within seven days.    
103

## 2020-07-23 ENCOUNTER — HOSPITAL ENCOUNTER (OUTPATIENT)
Facility: CLINIC | Age: 35
End: 2020-07-23
Payer: COMMERCIAL

## 2020-07-23 DIAGNOSIS — Z11.59 ENCOUNTER FOR SCREENING FOR OTHER VIRAL DISEASES: Primary | ICD-10-CM

## 2020-07-28 ENCOUNTER — MYC REFILL (OUTPATIENT)
Dept: PULMONOLOGY | Facility: CLINIC | Age: 35
End: 2020-07-28

## 2020-07-28 DIAGNOSIS — J45.51 SEVERE PERSISTENT ASTHMA WITH EXACERBATION (H): ICD-10-CM

## 2020-07-28 DIAGNOSIS — Z11.59 ENCOUNTER FOR SCREENING FOR OTHER VIRAL DISEASES: Primary | ICD-10-CM

## 2020-07-29 ENCOUNTER — MYC MEDICAL ADVICE (OUTPATIENT)
Dept: PULMONOLOGY | Facility: CLINIC | Age: 35
End: 2020-07-29

## 2020-07-29 ENCOUNTER — MYC REFILL (OUTPATIENT)
Dept: FAMILY MEDICINE | Facility: CLINIC | Age: 35
End: 2020-07-29

## 2020-07-29 DIAGNOSIS — Z30.011 ENCOUNTER FOR INITIAL PRESCRIPTION OF CONTRACEPTIVE PILLS: ICD-10-CM

## 2020-07-29 RX ORDER — PREDNISONE 20 MG/1
40 TABLET ORAL DAILY
Qty: 10 TABLET | Refills: 0 | Status: SHIPPED | OUTPATIENT
Start: 2020-07-29 | End: 2020-08-25

## 2020-07-31 ENCOUNTER — MYC REFILL (OUTPATIENT)
Dept: PULMONOLOGY | Facility: CLINIC | Age: 35
End: 2020-07-31

## 2020-07-31 DIAGNOSIS — J47.9 ADULT BRONCHIECTASIS (H): ICD-10-CM

## 2020-07-31 DIAGNOSIS — R11.0 NAUSEA: ICD-10-CM

## 2020-07-31 RX ORDER — ONDANSETRON 4 MG/1
4 TABLET, FILM COATED ORAL EVERY 8 HOURS PRN
Qty: 30 TABLET | Refills: 0 | Status: SHIPPED | OUTPATIENT
Start: 2020-07-31 | End: 2021-04-14

## 2020-08-04 RX ORDER — DROSPIRENONE AND ETHINYL ESTRADIOL 0.02-3(28)
1 KIT ORAL DAILY
Qty: 84 TABLET | Refills: 1 | Status: SHIPPED | OUTPATIENT
Start: 2020-08-04 | End: 2021-01-20

## 2020-08-08 ENCOUNTER — VIRTUAL VISIT (OUTPATIENT)
Dept: FAMILY MEDICINE | Facility: OTHER | Age: 35
End: 2020-08-08
Payer: COMMERCIAL

## 2020-08-08 PROCEDURE — 99421 OL DIG E/M SVC 5-10 MIN: CPT | Performed by: FAMILY MEDICINE

## 2020-08-09 NOTE — PROGRESS NOTES
"Date: 2020 19:02:27  Clinician: Prosper Paige  Clinician NPI: [id number]  Patient: Michelle Epstein  Patient : 1985  Patient Address: [address] [place] [address] [place] [address]  Patient Phone: [phone]  Visit Protocol: URI  Patient Summary:  Michelle is a 35 year old ( : 1985 ) female who initiated a Visit for cold, sinus infection, or influenza. When asked the question \"Please sign me up to receive news, health information and promotions from [employer].\", Michelle responded \"No\".    Michelle states her symptoms started 1-2 days ago.   Her symptoms consist of a cough and wheezing. She is experiencing mild difficulty breathing with activities but can speak normally in full sentences.   Symptom details     Cough: Michelle coughs every 5-10 minutes and her cough is more bothersome at night. Phlegm comes into her throat when she coughs. She does not believe her cough is caused by post-nasal drip. The color of the phlegm is white.     Wheezing: Michelle has been diagnosed with asthma. Additional wheezing details as reported by the patient (free text): Bronchiectasis and esinophilic asthma. Chronic cough. Wheezing is worse than baseline. I can tell I need prednisone        Michelle denies having ear pain, headache, chills, enlarged lymph nodes, nausea, sore throat, teeth pain, ageusia, diarrhea, nasal congestion, vomiting, rhinitis, myalgias, anosmia, facial pain or pressure, fever, and malaise. She also denies taking antibiotic medication in the past month and having recent facial or sinus surgery in the past 60 days.   Precipitating events  She has not recently been exposed to someone with influenza. Michelle has not been in close contact with any high risk individuals.   Pertinent COVID-19 (Coronavirus) information  In the past 14 days, Michelle has worked in a congregate living setting.   She either works or volunteers as a healthcare worker or a , or works or volunteers in a healthcare facility. 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 She lives with a healthcare worker.   Michelle has not had a close contact with a laboratory-confirmed COVID-19 patient within 14 days of symptom onset.   Since December 2019, Michelle and has not had upper respiratory infection or influenza-like illness. Has not been diagnosed with lab-confirmed COVID-19 test   Pertinent medical history  Michelle typically gets a yeast infection when she takes antibiotics. She has used fluconazole (Diflucan) to treat previous yeast infections. 2 doses of fluconazole (Diflucan) has typically been needed for symptoms to resolve in the past.  Michelle does not need a return to work/school note.   Weight: 185 lbs   Michelle does not smoke or use smokeless tobacco.   She denies pregnancy and denies breastfeeding. She has menstruated in the past month.   Additional information as reported by the patient (free text): I'm followed by a pulmonary doc and asthma doc at the [facility]. Chronically treated with prednisone for exacerbations   Weight: 185 lbs  A synchronous phone visit was initiated by the provider for the following reason: wheezing    MEDICATIONS: Nucala subcutaneous, Pulmicort inhalation, QUAN (28) oral, Wellbutrin XL oral, Singulair oral, fenofibrate oral, albuterol sulfate inhalation, ALLERGIES: Andreina Aspirin, ibuprofen  Clinician Response:  Elise Martinez,   Pt declines COVID testing    Diagnosis: Bronchiectasis with (acute) exacerbation  Diagnosis ICD: J47.1  Triage Notes: I reviewed the patient's history, verified their identity, and explained the Visit process.    Wheeze, typical symptoms for her Bronchiectosis.  Synchronous Triage: phone, status: completed, duration: 164 seconds  Prescription: prednisone 20 mg oral tablet 10 tablet, 5 days supply. Take 1 tablet by mouth 2 times per day for 5 days.  Refills: 0, Refill as needed: no, Allow substitutions: yes  Pharmacy: [id number] - [phone] - [address] ,  [place] [address]

## 2020-08-22 ENCOUNTER — E-VISIT (OUTPATIENT)
Dept: FAMILY MEDICINE | Facility: CLINIC | Age: 35
End: 2020-08-22
Payer: COMMERCIAL

## 2020-08-22 DIAGNOSIS — L70.0 ACNE VULGARIS: Primary | ICD-10-CM

## 2020-08-22 PROCEDURE — 99421 OL DIG E/M SVC 5-10 MIN: CPT | Performed by: NURSE PRACTITIONER

## 2020-08-25 RX ORDER — CLINDAMYCIN PHOSPHATE, BENZOYL PEROXIDE 25; 10 MG/G; MG/G
GEL TOPICAL
Qty: 50 G | Refills: 1 | Status: SHIPPED | OUTPATIENT
Start: 2020-08-25 | End: 2021-09-27

## 2020-08-25 NOTE — PATIENT INSTRUCTIONS
Thank you for choosing us for your care. I have placed an order for a prescription so that you can start treatment. View your full visit summary for details by clicking on the link below. Your pharmacist will able to address any questions you may have about the medication.     If you're not feeling better within 5-7 days, please schedule an appointment.  You can schedule an appointment right here in [facility], or call [phone]  If the visit is for the same symptoms as your e-visit, we'll refund the cost of your e-visit if seen within seven days.

## 2020-08-27 ENCOUNTER — MYC REFILL (OUTPATIENT)
Dept: FAMILY MEDICINE | Facility: CLINIC | Age: 35
End: 2020-08-27

## 2020-08-27 DIAGNOSIS — M62.830 BACK MUSCLE SPASM: ICD-10-CM

## 2020-08-27 RX ORDER — METHOCARBAMOL 750 MG/1
750 TABLET, FILM COATED ORAL 3 TIMES DAILY PRN
Qty: 60 TABLET | Refills: 1 | Status: SHIPPED | OUTPATIENT
Start: 2020-08-27 | End: 2021-05-21

## 2020-08-27 NOTE — TELEPHONE ENCOUNTER
Requested Prescriptions   Pending Prescriptions Disp Refills     methocarbamol (ROBAXIN) 750 MG tablet 20 tablet 0     Sig: Take 1 tablet (750 mg) by mouth 3 times daily as needed for muscle spasms       There is no refill protocol information for this order        Routing refill request to provider for review/approval because:  Drug not on the [facility] refill protocol         Manny Lara RN, BSN, PHN

## 2020-09-02 DIAGNOSIS — E78.1 HYPERTRIGLYCERIDEMIA: ICD-10-CM

## 2020-09-02 RX ORDER — FENOFIBRATE 54 MG/1
54 TABLET ORAL DAILY
Qty: 90 TABLET | Refills: 0 | Status: SHIPPED | OUTPATIENT
Start: 2020-09-02 | End: 2020-12-19

## 2020-09-03 DIAGNOSIS — F32.1 MODERATE SINGLE CURRENT EPISODE OF MAJOR DEPRESSIVE DISORDER (H): ICD-10-CM

## 2020-09-08 RX ORDER — HYDROXYZINE HYDROCHLORIDE 25 MG/1
25-50 TABLET, FILM COATED ORAL EVERY 6 HOURS PRN
Qty: 30 TABLET | Refills: 1 | Status: SHIPPED | OUTPATIENT
Start: 2020-09-08 | End: 2022-04-20

## 2020-09-08 NOTE — TELEPHONE ENCOUNTER
Routing refill request to provider for review/approval because:  Patient needs to be seen because it has been more than 1 year since last office visit.  Has had multiple E-visits in the last year, but has not been seen face-to-face since June 2019.    Lisa West RN  [facility]

## 2020-09-09 NOTE — TELEPHONE ENCOUNTER
Please send patient mychart message or call: she is due for video visit or in person for further refills in the future. The video visit would satisfy the 'in person' visit which she is due as it has been over a year since she was last seen in person.   Thank you,  MIKE Chatterjee, NP-C  [facility]

## 2020-09-22 ENCOUNTER — E-VISIT (OUTPATIENT)
Dept: FAMILY MEDICINE | Facility: CLINIC | Age: 35
End: 2020-09-22
Payer: COMMERCIAL

## 2020-09-22 DIAGNOSIS — K21.9 GASTROESOPHAGEAL REFLUX DISEASE, ESOPHAGITIS PRESENCE NOT SPECIFIED: Primary | ICD-10-CM

## 2020-09-22 PROCEDURE — 99421 OL DIG E/M SVC 5-10 MIN: CPT | Performed by: NURSE PRACTITIONER

## 2020-09-22 RX ORDER — PANTOPRAZOLE SODIUM 20 MG/1
20 TABLET, DELAYED RELEASE ORAL DAILY
Qty: 90 TABLET | Refills: 0 | Status: SHIPPED | OUTPATIENT
Start: 2020-09-22 | End: 2020-12-19

## 2020-09-23 ENCOUNTER — TELEPHONE (OUTPATIENT)
Facility: CLINIC | Age: 35
End: 2020-09-23

## 2020-09-24 ENCOUNTER — MYC MEDICAL ADVICE (OUTPATIENT)
Dept: FAMILY MEDICINE | Facility: CLINIC | Age: 35
End: 2020-09-24

## 2020-09-24 ENCOUNTER — TELEPHONE (OUTPATIENT)
Dept: CARDIOLOGY | Facility: CLINIC | Age: 35
End: 2020-09-24

## 2020-09-24 NOTE — TELEPHONE ENCOUNTER
Left voicemail for patient to complete Travel Screen for Cardiac Cath Lab appointment on 9/28 and inform patient of updated Visitor Policy.       Per chart review, having Covid test 9/25 @ [facility]. Also provided fax number.

## 2020-09-25 ENCOUNTER — E-VISIT (OUTPATIENT)
Dept: FAMILY MEDICINE | Facility: CLINIC | Age: 35
End: 2020-09-25
Payer: COMMERCIAL

## 2020-09-25 DIAGNOSIS — J01.90 ACUTE SINUSITIS WITH SYMPTOMS > 10 DAYS: Primary | ICD-10-CM

## 2020-09-25 PROCEDURE — 99421 OL DIG E/M SVC 5-10 MIN: CPT | Performed by: NURSE PRACTITIONER

## 2020-09-25 RX ORDER — PREDNISONE 20 MG/1
40 TABLET ORAL DAILY
Qty: 10 TABLET | Refills: 0 | Status: SHIPPED | OUTPATIENT
Start: 2020-09-25 | End: 2020-09-30

## 2020-09-25 RX ORDER — LEVOFLOXACIN 500 MG/1
500 TABLET, FILM COATED ORAL DAILY
Qty: 10 TABLET | Refills: 0 | Status: SHIPPED | OUTPATIENT
Start: 2020-09-25 | End: 2020-10-05

## 2020-10-15 DIAGNOSIS — J47.9 BRONCHIECTASIS WITHOUT ACUTE EXACERBATION (H): ICD-10-CM

## 2020-10-15 RX ORDER — ALBUTEROL SULFATE 0.83 MG/ML
SOLUTION RESPIRATORY (INHALATION)
Qty: 360 ML | Refills: 11 | Status: SHIPPED | OUTPATIENT
Start: 2020-10-15 | End: 2021-10-25

## 2020-10-16 ENCOUNTER — TELEPHONE (OUTPATIENT)
Dept: PULMONOLOGY | Facility: CLINIC | Age: 35
End: 2020-10-16

## 2020-10-16 DIAGNOSIS — J45.909 ASTHMA: Primary | ICD-10-CM

## 2020-10-16 RX ORDER — PREDNISONE 20 MG/1
TABLET ORAL
Qty: 10 TABLET | Refills: 0 | Status: SHIPPED | OUTPATIENT
Start: 2020-10-16 | End: 2020-12-03

## 2020-10-16 NOTE — TELEPHONE ENCOUNTER
Dr Garza,  This is a patient of Dr Lemos.   She is an asthma and bronchiectasis pt.  She has increased SOB and wheezing.  This is month 6 th since started on Nucala.  She states this happens occasionally before next dose of Nucala due.  She denies fever and increased cough.  Has yellow to clear sputum from bronciectasis. Using neb  4 times a day along with daily Azithromycin.  She is requesting Prednisone. Dr Lemos gave her   40 mg times 5 days in June. Can that be repeated?  Thanks  Kami

## 2020-10-24 DIAGNOSIS — I47.10 SVT (SUPRAVENTRICULAR TACHYCARDIA) (H): ICD-10-CM

## 2020-10-26 ENCOUNTER — MYC REFILL (OUTPATIENT)
Dept: CARDIOLOGY | Facility: CLINIC | Age: 35
End: 2020-10-26

## 2020-10-26 DIAGNOSIS — I47.10 SVT (SUPRAVENTRICULAR TACHYCARDIA) (H): ICD-10-CM

## 2020-10-27 ENCOUNTER — DOCUMENTATION ONLY (OUTPATIENT)
Dept: CARE COORDINATION | Facility: CLINIC | Age: 35
End: 2020-10-27

## 2020-10-27 RX ORDER — VERAPAMIL HYDROCHLORIDE 120 MG/1
120 CAPSULE, EXTENDED RELEASE ORAL DAILY
Qty: 30 CAPSULE | Refills: 0 | OUTPATIENT
Start: 2020-10-27

## 2020-10-28 RX ORDER — VERAPAMIL HYDROCHLORIDE 120 MG/1
CAPSULE, EXTENDED RELEASE ORAL
Qty: 30 CAPSULE | Refills: 0 | OUTPATIENT
Start: 2020-10-28

## 2020-10-31 ENCOUNTER — VIRTUAL VISIT (OUTPATIENT)
Dept: FAMILY MEDICINE | Facility: OTHER | Age: 35
End: 2020-10-31
Payer: COMMERCIAL

## 2020-10-31 PROCEDURE — 99421 OL DIG E/M SVC 5-10 MIN: CPT | Performed by: PHYSICIAN ASSISTANT

## 2020-10-31 NOTE — PROGRESS NOTES
"Date: 10/31/2020 10:26:39  Clinician: Keith Neff  Clinician NPI: [id number]  Patient: Michelle Epstein  Patient : 1985  Patient Address: [address] [place] [address] [place] [address]  Patient Phone: [phone]  Visit Protocol: URI  Patient Summary:  Michelle is a 35 year old ( : 1985 ) female who initiated a [facility] Visit for cold, sinus infection, or influenza. When asked the question \"Please sign me up to receive news, health information and promotions from [facility].\", Michelle responded \"Yes\".    Michelle states her symptoms started 1-2 days ago.   Her symptoms consist of wheezing, a cough, nasal congestion, and malaise. She is experiencing mild difficulty breathing with activities but can speak normally in full sentences.   Symptom details     Nasal secretions: The color of her mucus is yellow, white, and clear.    Cough: Michelle coughs every 5-10 minutes and her cough is more bothersome at night. Phlegm comes into her throat when she coughs. She does not believe her cough is caused by post-nasal drip. The color of the phlegm is white, yellow, and clear.     Wheezing: Michelle has been diagnosed with asthma. Additional wheezing details as reported by the patient (free text): Tight, wheezes. Nebs give some relief        Michelle denies having ear pain, headache, fever, nausea, facial pain or pressure, myalgias, chills, sore throat, teeth pain, ageusia, diarrhea, anosmia, vomiting, and rhinitis. She also denies having recent facial or sinus surgery in the past 60 days.   Precipitating events  She has not recently been exposed to someone with influenza. Michelle has not been in close contact with any high risk individuals.   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 In the past 14 days, Michelle has not worked or volunteered at a healthcare facility or group living setting.   In the past 14 days, she also has not " lived in a congregate living setting.   Michelle has not had a close contact with a laboratory-confirmed COVID-19 patient within 14 days of symptom onset.    Since December 2019, Michelle has not been diagnosed with lab-confirmed COVID-19 test and has not had upper respiratory infection or influenza-like illness.   Pertinent medical history  Michelle has taken an antibiotic medication in the past month. Antibiotic details as reported by the patient (free text): Levaquin (bronchiectasis)   Michelle typically gets a yeast infection when she takes antibiotics. She has used fluconazole (Diflucan) to treat previous yeast infections. 1 dose of fluconazole (Diflucan) has typically been sufficient for symptoms to resolve in the past.   Michelle does not need a return to work/school note.   Weight: 188 lbs   Michelle does not smoke or use smokeless tobacco.   She denies pregnancy and denies breastfeeding. She has menstruated in the past month.   Additional information as reported by the patient (free text): I have bronchiectasis and esinophilic asthma. On prednisone off and on for exacerbations. Followed by Dr. Luu and Dr. Marrero at the [facility] for both chronic conditions. Have had this wheezing in the past and feels the same as previous exacerbations. Prednisone 40mg x 3 days, 30mg x 3 days, 20mg x3 days 10mg x 3 days is usually very effective. Was on a five day course last exacerbation and it was not as effective this time.   Weight: 188 lbs    MEDICATIONS: Nucala subcutaneous, Pulmicort inhalation, QUAN (28) oral, Wellbutrin XL oral, Singulair oral, fenofibrate oral, albuterol sulfate inhalation, ALLERGIES: ibuprofen, Andreina Aspirin  Clinician Response:  Dear Michelle,   Your symptoms show that you may have coronavirus (COVID-19). This illness can cause fever, cough and trouble breathing. Many people get a mild case and get better on their own. Some people can get very sick.  What should I do?  We would like to test you for this virus.  "  1. Please call [phone] to schedule your visit. Explain that you were [facility] to have a COVID-19 test. Be ready to share your [facility] visit ID number.  The following will serve as your written order for this COVID Test, ordered by me, for the indication of suspected COVID [Z20.828]: The test will be ordered in [employer] our electronic health record, after you are scheduled. It will show as ordered and authorized by Vincent Delgado MD.  Order: COVID-19 (Coronavirus) PCR for SYMPTOMATIC testing from [facility].   2. When it's time for your COVID test:  Stay at least 6 feet away from others. (If someone will drive you to your test, stay in the backseat, as far away from the  as you can.)   Cover your mouth and nose with a mask, tissue or washcloth.  Go straight to the testing site. Don't make any stops on the way there or back.      3.Starting now: Stay home and away from others (self-isolate) until:   You've had no fever---and no medicine that reduces fever---for one full day (24 hours). And...   Your other symptoms have gotten better. For example, your cough or breathing has improved. And...   At least 10 days have passed since your symptoms started.       During this time, don't leave the house except for testing or medical care.   Stay in your own room, even for meals. Use your own bathroom if you can.   Stay away from others in your home. No hugging, kissing or shaking hands. No visitors.  Don't go to work, school or anywhere else.    Clean \"high touch\" surfaces often (doorknobs, counters, handles, etc.). Use a household cleaning spray or wipes. You'll find a full list of  on the EPA website: www.epa.gov/pesticide-registration/list-n-disinfectants-use-against-sars-cov-2.   Cover your mouth and nose with a mask, tissue or washcloth to avoid spreading germs.  Wash your hands and face often. Use soap and water.  Caregivers in these groups are at risk for severe illness due to COVID-19:  o People 65 " years and older  o People who live in a nursing home or long-term care facility  o People with chronic disease (lung, heart, cancer, diabetes, kidney, liver, immunologic)  o People who have a weakened immune system, including those who:   Are in cancer treatment  Take medicine that weakens the immune system, such as corticosteroids  Had a bone marrow or organ transplant  Have an immune deficiency  Have poorly controlled HIV or AIDS  Are obese (body mass index of 40 or higher)  Smoke regularly   o Caregivers should wear gloves while washing dishes, handling laundry and cleaning bedrooms and bathrooms.  o Use caution when washing and drying laundry: Don't shake dirty laundry, and use the warmest water setting that you can.  o For more tips, go to www.cdc.gov/coronavirus/2019-ncov/downloads/10Things.pdf.    How can I take care of myself?    Get lots of rest. Drink extra fluids (unless a doctor has told you not to).   Take Tylenol (acetaminophen) for fever or pain. If you have liver or kidney problems, ask your family doctor if it's okay to take Tylenol.   Adults can take either:    650 mg (two 325 mg pills) every 4 to 6 hours, or...   1,000 mg (two 500 mg pills) every 8 hours as needed.    Note: Don't take more than 3,000 mg in one day. Acetaminophen is found in many medicines (both prescribed and over-the-counter medicines). Read all labels to be sure you don't take too much.   For children, check the Tylenol bottle for the right dose. The dose is based on the child's age or weight.    If you have other health problems (like cancer, heart failure, an organ transplant or severe kidney disease): Call your specialty clinic if you don't feel better in the next 2 days.       Know when to call 911. Emergency warning signs include:    Trouble breathing or shortness of breath Pain or pressure in the chest that doesn't go away Feeling confused like you haven't felt before, or not being able to wake up Bluish-colored lips or  face.  Where can I get more information?   [facility] -- About COVID-19: [email or web address]   CDC -- What to Do If You're Sick: www.cdc.gov/coronavirus/2019-ncov/about/steps-when-sick.html   CDC -- Ending Home Isolation: www.cdc.gov/coronavirus/2019-ncov/hcp/disposition-in-home-patients.html   [facility] -- Caring for Someone: www.cdc.gov/coronavirus/2019-ncov/if-you-are-sick/care-for-someone.html   [facility] -- Interim Guidance for Hospital Discharge to Home: www.[facility].us/diseases/coronavirus/hcp/hospdischarge.pdf   [facility] clinical trials (COVID-19 research studies): clinicalaffairs.[facility]-clinical-trials    Below are the COVID-19 hotlines at the [place] Department of [facility] ([facility]). Interpreters are available.    For health questions: Call 451-221-9866 or 1-839.471.6203 (7 a.m. to 7 p.m.) For questions about schools and childcare: Call 136-916-8259 or 1-254.599.3317 (7 a.m. to 7 p.m.)    Diagnosis: Contact with and (suspected) exposure to other viral communicable diseases  Diagnosis ICD: Z20.828

## 2020-11-01 ENCOUNTER — MYC MEDICAL ADVICE (OUTPATIENT)
Dept: PULMONOLOGY | Facility: CLINIC | Age: 35
End: 2020-11-01

## 2020-11-01 DIAGNOSIS — J45.901 ASTHMA EXACERBATION: Primary | ICD-10-CM

## 2020-11-02 RX ORDER — LEVOFLOXACIN 750 MG/1
750 TABLET, FILM COATED ORAL DAILY
Qty: 7 TABLET | Refills: 0 | Status: SHIPPED | OUTPATIENT
Start: 2020-11-02 | End: 2020-11-09

## 2020-11-02 RX ORDER — PREDNISONE 10 MG/1
TABLET ORAL
Qty: 50 TABLET | Refills: 0 | Status: SHIPPED | OUTPATIENT
Start: 2020-11-02 | End: 2020-12-03

## 2020-11-05 RX ORDER — PANTOPRAZOLE SODIUM 20 MG/1
20 TABLET, DELAYED RELEASE ORAL DAILY
Qty: 90 TABLET | Refills: 0 | OUTPATIENT
Start: 2020-11-05

## 2020-12-03 ENCOUNTER — E-VISIT (OUTPATIENT)
Dept: FAMILY MEDICINE | Facility: CLINIC | Age: 35
End: 2020-12-03
Payer: COMMERCIAL

## 2020-12-03 DIAGNOSIS — J01.90 ACUTE SINUSITIS WITH SYMPTOMS > 10 DAYS: ICD-10-CM

## 2020-12-03 DIAGNOSIS — J01.90 ACUTE SINUSITIS TREATED WITH ANTIBIOTICS IN THE PAST 60 DAYS: Primary | ICD-10-CM

## 2020-12-03 PROCEDURE — 99421 OL DIG E/M SVC 5-10 MIN: CPT | Performed by: NURSE PRACTITIONER

## 2020-12-03 RX ORDER — PREDNISONE 20 MG/1
TABLET ORAL
Qty: 20 TABLET | Refills: 0 | Status: SHIPPED | OUTPATIENT
Start: 2020-12-03 | End: 2021-02-22

## 2020-12-03 RX ORDER — LEVOFLOXACIN 500 MG/1
500 TABLET, FILM COATED ORAL DAILY
Qty: 10 TABLET | Refills: 0 | Status: SHIPPED | OUTPATIENT
Start: 2020-12-03 | End: 2020-12-13

## 2020-12-07 ENCOUNTER — MYC MEDICAL ADVICE (OUTPATIENT)
Dept: PULMONOLOGY | Facility: CLINIC | Age: 35
End: 2020-12-07

## 2020-12-07 ENCOUNTER — TELEPHONE (OUTPATIENT)
Dept: PULMONOLOGY | Facility: CLINIC | Age: 35
End: 2020-12-07

## 2020-12-07 ENCOUNTER — VIRTUAL VISIT (OUTPATIENT)
Dept: PULMONOLOGY | Facility: CLINIC | Age: 35
End: 2020-12-07
Payer: COMMERCIAL

## 2020-12-07 DIAGNOSIS — J45.50 SEVERE PERSISTENT ASTHMA DEPENDENT ON SYSTEMIC STEROIDS (H): ICD-10-CM

## 2020-12-07 DIAGNOSIS — J45.50 SEVERE PERSISTENT ASTHMA, UNSPECIFIED WHETHER COMPLICATED (H): Primary | ICD-10-CM

## 2020-12-07 DIAGNOSIS — J45.50 SEVERE PERSISTENT ASTHMA, UNSPECIFIED WHETHER COMPLICATED (H): ICD-10-CM

## 2020-12-07 DIAGNOSIS — J45.50 SEVERE PERSISTENT ASTHMA (H): Primary | ICD-10-CM

## 2020-12-07 DIAGNOSIS — J82.83 EOSINOPHILIC ASTHMA: ICD-10-CM

## 2020-12-07 DIAGNOSIS — Z79.52 SEVERE PERSISTENT ASTHMA DEPENDENT ON SYSTEMIC STEROIDS (H): ICD-10-CM

## 2020-12-07 PROCEDURE — 99214 OFFICE O/P EST MOD 30 MIN: CPT | Mod: 95

## 2020-12-07 RX ORDER — DUPILUMAB 300 MG/2ML
300 INJECTION, SOLUTION SUBCUTANEOUS
Qty: 2 ML | Refills: 26 | Status: SHIPPED | OUTPATIENT
Start: 2020-12-07 | End: 2021-12-16

## 2020-12-07 RX ORDER — DUPILUMAB 300 MG/2ML
600 INJECTION, SOLUTION SUBCUTANEOUS ONCE
Qty: 4 ML | Refills: 0 | Status: SHIPPED | OUTPATIENT
Start: 2020-12-07 | End: 2020-12-07

## 2020-12-07 RX ORDER — MOMETASONE FUROATE AND FORMOTEROL FUMARATE DIHYDRATE 200; 5 UG/1; UG/1
2 AEROSOL RESPIRATORY (INHALATION) 2 TIMES DAILY
Qty: 1 INHALER | Refills: 11 | Status: SHIPPED | OUTPATIENT
Start: 2020-12-07 | End: 2020-12-08

## 2020-12-07 NOTE — Clinical Note
Can you please set her up with dupixent? We are going to stop nucala.  Start her on the 600 mg intial dose, then 300 mg q 2 weeks.

## 2020-12-07 NOTE — TELEPHONE ENCOUNTER
Contacted pt to discuss new rx for Dupixent. Advised pt that rx for Dupixent auto-injector (pen) will be sent to  [facility], they will prior authorization and I will get back to her with update on PA.  Encourange pt to sign up for Dupixent MyWay program at [employer].  Pt currently uses Nucala, she is comfortable with self administration.  Pt eduction material sent via [employer].
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
## 2020-12-07 NOTE — TELEPHONE ENCOUNTER
Initiated [facility] Enrollment form and emailed it to Dr. Lemos for his signature.

## 2020-12-08 ENCOUNTER — TELEPHONE (OUTPATIENT)
Dept: PULMONOLOGY | Facility: CLINIC | Age: 35
End: 2020-12-08

## 2020-12-08 RX ORDER — MOMETASONE FUROATE AND FORMOTEROL FUMARATE DIHYDRATE 200; 5 UG/1; UG/1
2 AEROSOL RESPIRATORY (INHALATION) 2 TIMES DAILY
Qty: 1 INHALER | Refills: 11 | Status: SHIPPED | OUTPATIENT
Start: 2020-12-08 | End: 2023-02-06

## 2020-12-08 NOTE — TELEPHONE ENCOUNTER
PA Initiation    Medication: Dupixent 300MG/ML Prefilled Pens (PENDING)  Insurance Company: [facility] - Phone [phone] Fax [phone]  Pharmacy Filling the Rx: [facility] - [place], [place] - [facility] [address]  Filling Pharmacy Phone: [phone]  Filling Pharmacy Fax: [phone]  Start Date: 12/8/2020

## 2020-12-08 NOTE — TELEPHONE ENCOUNTER
Prior Authorization Approval    Authorization Effective Date: 11/8/2020  Authorization Expiration Date: 6/6/2021  Medication: Dupixent 300MG/ML Prefilled Pens (APPROVED)  Approved Dose/Quantity: 28 days  Reference #:     Insurance Company: [employer] - Phone [phone] Fax [phone]  Expected CoPay: $14.92     CoPay Card Available: No    Foundation Assistance Needed:    Which Pharmacy is filling the prescription (Not needed for infusion/clinic administered): [facility] - [place], [place] - [address]  Pharmacy Notified: Yes  Patient Notified: Yes

## 2020-12-09 NOTE — TELEPHONE ENCOUNTER
Left message with pt and sent mychart message asking how she wants me to get her the dupxient myway forms to be signed.

## 2020-12-13 ENCOUNTER — HEALTH MAINTENANCE LETTER (OUTPATIENT)
Age: 35
End: 2020-12-13

## 2020-12-16 DIAGNOSIS — K21.9 GASTROESOPHAGEAL REFLUX DISEASE, UNSPECIFIED WHETHER ESOPHAGITIS PRESENT: Primary | ICD-10-CM

## 2020-12-16 DIAGNOSIS — E78.1 HYPERTRIGLYCERIDEMIA: ICD-10-CM

## 2020-12-19 RX ORDER — FENOFIBRATE 54 MG/1
TABLET ORAL
Qty: 90 TABLET | Refills: 1 | Status: SHIPPED | OUTPATIENT
Start: 2020-12-19 | End: 2021-07-20

## 2020-12-19 RX ORDER — PANTOPRAZOLE SODIUM 20 MG/1
20 TABLET, DELAYED RELEASE ORAL DAILY
Qty: 90 TABLET | Refills: 2 | Status: SHIPPED | OUTPATIENT
Start: 2020-12-19 | End: 2022-05-03

## 2020-12-19 NOTE — TELEPHONE ENCOUNTER
Prescription approved per [facility] Refill Protocol.  Maria Isabel Garcia RN  [facility]

## 2020-12-19 NOTE — TELEPHONE ENCOUNTER
Prescription approved per [facility] Refill Protocol.  Maria Isabel Garcia RN  [facility]

## 2020-12-21 ENCOUNTER — TELEPHONE (OUTPATIENT)
Dept: PULMONOLOGY | Facility: CLINIC | Age: 35
End: 2020-12-21

## 2020-12-22 ENCOUNTER — TELEPHONE (OUTPATIENT)
Dept: PULMONOLOGY | Facility: CLINIC | Age: 35
End: 2020-12-22

## 2020-12-22 NOTE — TELEPHONE ENCOUNTER
Alexandria Fitting Appointment Note     Michelle Epstein is well known to our Cystic Fibrosis clinic and evaluated in her home to assess fit and tolerance of the Alexandria device for additional airway clearance options.   She currently participates in airway clearance two times daily, and increases to three times daily with illness. Michelle experiences difficulty getting these 30 minute sessions in due to caring for her children in the home, and managing daily activities.    The Alexandria device is a reliable, powerful, and battery operated portable option for airway clearance. It is almost 30% lighter than current device and significantly quieter, making it much easier to transport and use while taking care of her children. The Alexandria also allows for targeted therapy to specific lung regions through an easy to use control, allowing for additional benefits that her current device does not allow.     Additionally, Michelle shows signs of bronchiectasis on Chest CT 2/27/2020, frequent use of antibiotics, and daily productive cough that make it necessary for multiple times daily aggressive airway clearance. Regular airway clearance in people with Bronchiectasis has been proven to increase or stabilize lung function, decrease antibiotic use, and decrease exacerbations requiring hospitalizations. The Alexandria allows for greater flexibility in all of these required multi-times daily treatments in.      The device was fit in her home, and Michelle wore it for >20 minutes to assess tolerance and benefits of use. Safety, basics of use, warranty, treatment specifics, and ordering process were reviewed with her. Provider and patient would like to move forward with the ordering process at this time.

## 2021-01-13 ENCOUNTER — MYC REFILL (OUTPATIENT)
Dept: PULMONOLOGY | Facility: CLINIC | Age: 36
End: 2021-01-13

## 2021-01-13 DIAGNOSIS — B37.31 YEAST INFECTION OF THE VAGINA: ICD-10-CM

## 2021-01-14 RX ORDER — FLUCONAZOLE 150 MG/1
150 TABLET ORAL
Qty: 4 TABLET | Refills: 0 | Status: SHIPPED | OUTPATIENT
Start: 2021-01-14 | End: 2021-03-04

## 2021-01-20 DIAGNOSIS — Z30.011 ENCOUNTER FOR INITIAL PRESCRIPTION OF CONTRACEPTIVE PILLS: ICD-10-CM

## 2021-01-20 DIAGNOSIS — I47.10 SVT (SUPRAVENTRICULAR TACHYCARDIA) (H): ICD-10-CM

## 2021-01-20 RX ORDER — DROSPIRENONE AND ETHINYL ESTRADIOL 0.02-3(28)
1 KIT ORAL DAILY
Qty: 84 TABLET | Refills: 3 | Status: SHIPPED | OUTPATIENT
Start: 2021-01-20 | End: 2021-11-19

## 2021-01-20 NOTE — TELEPHONE ENCOUNTER
Health Maintenance Due   Topic Date Due     HEPATITIS C SCREENING  04/30/2002     ASTHMA ACTION PLAN  06/04/2019     ASTHMA CONTROL TEST  12/18/2019     PREVENTIVE CARE VISIT  06/18/2020     INFLUENZA VACCINE (1) 09/01/2020     Overdue for multiple health maintenance.   Please advise on refill.  Urvashi Casanova RN

## 2021-01-22 NOTE — TELEPHONE ENCOUNTER
VERAPAMIL HCL SR 120MG CP24     Last Written Prescription Date:  10/26/20  Last Fill Quantity: 90,   # refills: 0  Last Office Visit : 7/9/20  Future Office visit:  none    Routing refill request to provider for review/approval because:  Abnormal Cr  Creatinine   Date Value Ref Range Status   07/09/2020 1.12 (H) 0.52 - 1.04 mg/dL Final   Thank-you, Kala MARIO RN Medication Refill Team

## 2021-01-25 RX ORDER — VERAPAMIL HYDROCHLORIDE 120 MG/1
120 CAPSULE, EXTENDED RELEASE ORAL DAILY
Qty: 90 CAPSULE | Refills: 0 | Status: SHIPPED | OUTPATIENT
Start: 2021-01-25 | End: 2021-04-21

## 2021-02-16 DIAGNOSIS — J47.9 BRONCHIECTASIS (H): ICD-10-CM

## 2021-02-16 RX ORDER — LEVALBUTEROL INHALATION SOLUTION 1.25 MG/3ML
1 SOLUTION RESPIRATORY (INHALATION) 4 TIMES DAILY
Qty: 360 ML | Refills: 0 | Status: SHIPPED | OUTPATIENT
Start: 2021-02-16 | End: 2021-09-27

## 2021-02-22 ENCOUNTER — E-VISIT (OUTPATIENT)
Dept: FAMILY MEDICINE | Facility: CLINIC | Age: 36
End: 2021-02-22
Payer: COMMERCIAL

## 2021-02-22 DIAGNOSIS — J01.90 ACUTE SINUSITIS WITH SYMPTOMS GREATER THAN 10 DAYS: Primary | ICD-10-CM

## 2021-02-22 DIAGNOSIS — J01.90 ACUTE SINUSITIS TREATED WITH ANTIBIOTICS IN THE PAST 60 DAYS: ICD-10-CM

## 2021-02-22 DIAGNOSIS — J01.90 ACUTE BACTERIAL SINUSITIS: ICD-10-CM

## 2021-02-22 DIAGNOSIS — B96.89 ACUTE BACTERIAL SINUSITIS: ICD-10-CM

## 2021-02-22 PROCEDURE — 99421 OL DIG E/M SVC 5-10 MIN: CPT | Performed by: NURSE PRACTITIONER

## 2021-02-22 RX ORDER — PREDNISONE 20 MG/1
TABLET ORAL
Qty: 20 TABLET | Refills: 0 | Status: SHIPPED | OUTPATIENT
Start: 2021-02-22 | End: 2021-04-09

## 2021-02-22 RX ORDER — LEVOFLOXACIN 750 MG/1
750 TABLET, FILM COATED ORAL DAILY
Qty: 7 TABLET | Refills: 0 | Status: SHIPPED | OUTPATIENT
Start: 2021-02-22 | End: 2021-04-09

## 2021-02-22 NOTE — PATIENT INSTRUCTIONS
Dear Michelle Epstein    After reviewing your responses, I've been able to diagnose you with?a sinus infection caused by bacteria.?     Based on your responses and diagnosis, I have prescribed prednisone and levoquin to treat your symptoms. I have sent this to your pharmacy.?     It is also important to stay well hydrated, get lots of rest and take over-the-counter decongestants,?tylenol?or ibuprofen if you?are able to?take those medications per your primary care provider to help relieve discomfort.?     It is important that you take?all of?your prescribed medication even if your symptoms are improving after a few doses.? Taking?all of?your medicine helps prevent the symptoms from returning.?     If your symptoms worsen, you develop severe headache, vomiting, high fever (>102), or are not improving in 7 days, please contact your primary care provider for an appointment or visit any of our convenient Walk-in Care or Urgent Care Centers to be seen which can be found on our website?here.?     Thanks again for choosing?us?as your health care partner,?   ?  ARELY Chatterjee

## 2021-03-04 ENCOUNTER — MYC MEDICAL ADVICE (OUTPATIENT)
Dept: PULMONOLOGY | Facility: CLINIC | Age: 36
End: 2021-03-04

## 2021-03-04 DIAGNOSIS — F32.1 MODERATE SINGLE CURRENT EPISODE OF MAJOR DEPRESSIVE DISORDER (H): ICD-10-CM

## 2021-03-04 DIAGNOSIS — F41.9 ANXIETY: ICD-10-CM

## 2021-03-04 DIAGNOSIS — B37.31 YEAST INFECTION OF THE VAGINA: ICD-10-CM

## 2021-03-04 DIAGNOSIS — J45.909 ASTHMA: ICD-10-CM

## 2021-03-04 DIAGNOSIS — J30.81 ALLERGIC RHINITIS DUE TO ANIMALS: ICD-10-CM

## 2021-03-04 RX ORDER — FLUCONAZOLE 150 MG/1
150 TABLET ORAL
Qty: 4 TABLET | Refills: 0 | Status: SHIPPED | OUTPATIENT
Start: 2021-03-04 | End: 2021-05-18

## 2021-03-04 RX ORDER — BUPROPION HYDROCHLORIDE 300 MG/1
300 TABLET ORAL EVERY MORNING
Qty: 90 TABLET | Refills: 1 | Status: SHIPPED | OUTPATIENT
Start: 2021-03-04 | End: 2021-10-06

## 2021-03-04 NOTE — TELEPHONE ENCOUNTER
Routing refill request to provider for review/approval because:  Drug not on the [facility] refill protocol     Onelia MCCARTHYN, RN

## 2021-03-05 RX ORDER — MONTELUKAST SODIUM 10 MG/1
TABLET ORAL
Qty: 30 TABLET | Refills: 11 | Status: SHIPPED | OUTPATIENT
Start: 2021-03-05 | End: 2022-05-03

## 2021-03-09 ENCOUNTER — MYC MEDICAL ADVICE (OUTPATIENT)
Dept: FAMILY MEDICINE | Facility: CLINIC | Age: 36
End: 2021-03-09

## 2021-03-11 NOTE — TELEPHONE ENCOUNTER
Paperwork completed by Dr. Lemos.  Left message for patient to let us know how she would like the paperwork to be returned to her.

## 2021-03-12 NOTE — TELEPHONE ENCOUNTER
Faxed FMLA to [facility] and mailed to patient. Copied FMLA for our records.

## 2021-03-23 ENCOUNTER — E-VISIT (OUTPATIENT)
Dept: FAMILY MEDICINE | Facility: CLINIC | Age: 36
End: 2021-03-23
Payer: COMMERCIAL

## 2021-03-23 DIAGNOSIS — E66.3 OVERWEIGHT: Primary | ICD-10-CM

## 2021-03-23 DIAGNOSIS — F32.1 MODERATE SINGLE CURRENT EPISODE OF MAJOR DEPRESSIVE DISORDER (H): ICD-10-CM

## 2021-03-23 PROCEDURE — 99422 OL DIG E/M SVC 11-20 MIN: CPT | Performed by: NURSE PRACTITIONER

## 2021-03-24 VITALS — HEART RATE: 95 BPM | DIASTOLIC BLOOD PRESSURE: 79 MMHG | SYSTOLIC BLOOD PRESSURE: 115 MMHG

## 2021-03-24 RX ORDER — BUPROPION HYDROCHLORIDE 150 MG/1
150 TABLET ORAL EVERY MORNING
Qty: 7 TABLET | Refills: 0 | Status: CANCELLED | OUTPATIENT
Start: 2021-03-24

## 2021-03-24 RX ORDER — BUPROPION HYDROCHLORIDE 75 MG/1
TABLET ORAL
Qty: 21 TABLET | Refills: 0 | Status: SHIPPED | OUTPATIENT
Start: 2021-03-24 | End: 2021-04-19

## 2021-03-24 NOTE — PATIENT INSTRUCTIONS
The Contrave does not appear to be covered by your insurance but I will send it and we can go from there.    Instructions with taper up of contrave and taper off of buproprion (since it will be part of the new medication)    Week 1 contrave 1 pill daily, decrease buproprion to 75 mg twice a day x 7 days  Week 2 contrave 1 pill twice a day, decrease buproprion to 75 mg daily  Week 3 contrave 2 pills in the morning and 1 in evening, stop buproprion  Week 4 contrave 2 pills twice a day (total buproprion dose in contrave is 360mg, more than what you were originally taking FYI)     --Contrave isn't used for mood, but you will be on a higher dose by the end of tapering up. Monitor for worsening mood, thoughts of suicide, or plan to hurt self: if occurs please let us know right away.   --We can trial this for up to 12 weeks, if no improvement in weight we will taper off  --Follow up with me in 4 weeks in person preferred so we can do an assessment and see how things are going  Thank you for choosing us for your care. I have placed an order for a prescription so that you can start treatment. View your full visit summary for details by clicking on the link below. Your pharmacist will able to address any questions you may have about the medication.     If you're not feeling better within 5-7 days, please schedule an appointment.  You can schedule an appointment right here in [facility], or call [phone]  If the visit is for the same symptoms as your eVisit, we'll refund the cost of your eVisit if seen within seven days.

## 2021-03-26 ENCOUNTER — TELEPHONE (OUTPATIENT)
Dept: FAMILY MEDICINE | Facility: CLINIC | Age: 36
End: 2021-03-26

## 2021-03-26 NOTE — TELEPHONE ENCOUNTER
PA required.   Obtain PA or change medications for naltrexone-bupropion (CONTRAVE) 8-90 MG per 12 hr tablet ?    To complete the PA :  1.  Go to [email or web address] and click 'Enter a Key'  2.  Enter the patient's last name and  and the key.       Key: [id number]      Last Name: abdelrahman      : [birth year]  3.  Complete the form and click 'Send to Plan'

## 2021-03-26 NOTE — TELEPHONE ENCOUNTER
Please start PA.  Thank you,  MIKE Chatterjee, NP-C  [facility]     Subjective:      Anay Stringer is a 18 m.o. female here with mother. Patient brought in for Well Child      History of Present Illness:  Stopped miralax about a week ago and doing ok so far.     Well Child Exam  Diet - WNL - Diet includes cow's milk, family meals and finger foods (great breakfast. yogurt, pancakes.  mac and cheese.  red beans.  Not interested in meat.  )    Growth, Elimination, Sleep - WNL - Voiding normal, stooling normal, sleeping normal and growth chart normal  Physical Activity - WNL - active play time  Behavior - WNL -  Development - abnormalities/concerns present - expressive speech delay (mild language delay)  School - normal -Normal School Details: in home .  Household/Safety - WNL - safe environment    Mama, aide, hello, bye, all done, tania (x 1). A couple of signs.      Review of Systems   Constitutional: Negative for activity change, appetite change and fever.   HENT: Negative for congestion and sore throat.    Eyes: Negative for discharge and redness.   Respiratory: Positive for cough. Negative for wheezing.    Cardiovascular: Negative for chest pain and cyanosis.   Gastrointestinal: Negative for constipation, diarrhea and vomiting.   Genitourinary: Negative for difficulty urinating and hematuria.   Skin: Negative for rash and wound.   Neurological: Negative for syncope and headaches.   Psychiatric/Behavioral: Negative for behavioral problems and sleep disturbance.       Objective:     Physical Exam   Constitutional: She appears well-developed and well-nourished. She is active.   HENT:   Head: Normocephalic.   Right Ear: Tympanic membrane and external ear normal.   Left Ear: Tympanic membrane and external ear normal.   Nose: Nose normal. No congestion.   Mouth/Throat: Mucous membranes are moist. Dentition is normal. Oropharynx is clear.   Eyes: EOM are normal. Pupils are equal, round, and reactive to light.   Neck: Normal range of motion. Neck supple. No neck adenopathy.    Cardiovascular: Normal rate, regular rhythm, S1 normal and S2 normal.    No murmur heard.  Pulses:       Radial pulses are 2+ on the right side, and 2+ on the left side.   Pulmonary/Chest: Effort normal and breath sounds normal. No respiratory distress.   Abdominal: Soft. Bowel sounds are normal. She exhibits no distension. There is no hepatosplenomegaly. There is no tenderness.   Genitourinary:   Genitourinary Comments: T 1.    Musculoskeletal: Normal range of motion.   Lymphadenopathy: No anterior cervical adenopathy or posterior cervical adenopathy.   Neurological: She is alert. She has normal strength.   Normal gait for age.   Skin: Skin is warm. No rash noted.   Nursing note and vitals reviewed.      Assessment:        1. Encounter for routine child health examination without abnormal findings    2. Mild expressive language delay         Plan:       Age appropriate anticipatory guidance.  Immunizations per orders.  Monitor language

## 2021-04-09 ENCOUNTER — E-VISIT (OUTPATIENT)
Dept: FAMILY MEDICINE | Facility: CLINIC | Age: 36
End: 2021-04-09

## 2021-04-09 DIAGNOSIS — E66.3 OVERWEIGHT: ICD-10-CM

## 2021-04-09 DIAGNOSIS — J01.90 ACUTE BACTERIAL SINUSITIS: Primary | ICD-10-CM

## 2021-04-09 DIAGNOSIS — J01.90 ACUTE SINUSITIS WITH SYMPTOMS GREATER THAN 10 DAYS: ICD-10-CM

## 2021-04-09 DIAGNOSIS — B96.89 ACUTE BACTERIAL SINUSITIS: Primary | ICD-10-CM

## 2021-04-09 LAB
ALBUMIN SERPL-MCNC: 3.4 G/DL (ref 3.4–5)
ALP SERPL-CCNC: 31 U/L (ref 40–150)
ALT SERPL W P-5'-P-CCNC: 24 U/L (ref 0–50)
ANION GAP SERPL CALCULATED.3IONS-SCNC: 3 MMOL/L (ref 3–14)
AST SERPL W P-5'-P-CCNC: 14 U/L (ref 0–45)
BILIRUB SERPL-MCNC: 0.3 MG/DL (ref 0.2–1.3)
BUN SERPL-MCNC: 10 MG/DL (ref 7–30)
CALCIUM SERPL-MCNC: 8.9 MG/DL (ref 8.5–10.1)
CHLORIDE SERPL-SCNC: 111 MMOL/L (ref 94–109)
CO2 SERPL-SCNC: 27 MMOL/L (ref 20–32)
CREAT SERPL-MCNC: 0.95 MG/DL (ref 0.52–1.04)
ERYTHROCYTE [DISTWIDTH] IN BLOOD BY AUTOMATED COUNT: 13.9 % (ref 10–15)
GFR SERPL CREATININE-BSD FRML MDRD: 77 ML/MIN/{1.73_M2}
GLUCOSE SERPL-MCNC: 81 MG/DL (ref 70–99)
HCT VFR BLD AUTO: 40.8 % (ref 35–47)
HGB BLD-MCNC: 12.8 G/DL (ref 11.7–15.7)
MCH RBC QN AUTO: 29.2 PG (ref 26.5–33)
MCHC RBC AUTO-ENTMCNC: 31.4 G/DL (ref 31.5–36.5)
MCV RBC AUTO: 93 FL (ref 78–100)
PLATELET # BLD AUTO: 270 10E9/L (ref 150–450)
POTASSIUM SERPL-SCNC: 4.1 MMOL/L (ref 3.4–5.3)
PROT SERPL-MCNC: 6.8 G/DL (ref 6.8–8.8)
RBC # BLD AUTO: 4.39 10E12/L (ref 3.8–5.2)
SODIUM SERPL-SCNC: 141 MMOL/L (ref 133–144)
WBC # BLD AUTO: 10.2 10E9/L (ref 4–11)

## 2021-04-09 PROCEDURE — 85027 COMPLETE CBC AUTOMATED: CPT | Performed by: NURSE PRACTITIONER

## 2021-04-09 PROCEDURE — 99421 OL DIG E/M SVC 5-10 MIN: CPT | Performed by: NURSE PRACTITIONER

## 2021-04-09 PROCEDURE — 36415 COLL VENOUS BLD VENIPUNCTURE: CPT | Performed by: NURSE PRACTITIONER

## 2021-04-09 PROCEDURE — 80053 COMPREHEN METABOLIC PANEL: CPT | Performed by: NURSE PRACTITIONER

## 2021-04-09 RX ORDER — LEVOFLOXACIN 750 MG/1
750 TABLET, FILM COATED ORAL DAILY
Qty: 7 TABLET | Refills: 0 | Status: SHIPPED | OUTPATIENT
Start: 2021-04-09 | End: 2021-05-11

## 2021-04-09 RX ORDER — PREDNISONE 20 MG/1
TABLET ORAL
Qty: 20 TABLET | Refills: 0 | Status: SHIPPED | OUTPATIENT
Start: 2021-04-09 | End: 2021-07-06

## 2021-04-09 NOTE — PATIENT INSTRUCTIONS
Dear Michelle Epstein    After reviewing your responses, I've been able to diagnose you with?a sinus infection caused by bacteria.?     Based on your responses and diagnosis, I have prescribed levoquin and prednisone to treat your symptoms. I have sent this to your pharmacy.?     It is also important to stay well hydrated, get lots of rest and take over-the-counter decongestants,?tylenol?or ibuprofen if you?are able to?take those medications per your primary care provider to help relieve discomfort.?     It is important that you take?all of?your prescribed medication even if your symptoms are improving after a few doses.? Taking?all of?your medicine helps prevent the symptoms from returning.?     If your symptoms worsen, you develop severe headache, vomiting, high fever (>102), or are not improving in 7 days, please contact your primary care provider for an appointment or visit any of our convenient Walk-in Care or Urgent Care Centers to be seen which can be found on our website?here.?     Thanks again for choosing?us?as your health care partner,?   ?  MIKE Chatterjee, NP-C  [facility]

## 2021-04-14 ENCOUNTER — MYC REFILL (OUTPATIENT)
Dept: PULMONOLOGY | Facility: CLINIC | Age: 36
End: 2021-04-14

## 2021-04-14 DIAGNOSIS — R11.0 NAUSEA: ICD-10-CM

## 2021-04-14 DIAGNOSIS — J47.9 ADULT BRONCHIECTASIS (H): ICD-10-CM

## 2021-04-14 RX ORDER — ONDANSETRON 4 MG/1
4 TABLET, FILM COATED ORAL EVERY 8 HOURS PRN
Qty: 30 TABLET | Refills: 0 | Status: SHIPPED | OUTPATIENT
Start: 2021-04-14 | End: 2021-06-08

## 2021-04-16 ENCOUNTER — E-VISIT (OUTPATIENT)
Dept: FAMILY MEDICINE | Facility: CLINIC | Age: 36
End: 2021-04-16
Payer: COMMERCIAL

## 2021-04-16 DIAGNOSIS — E66.3 OVERWEIGHT: Primary | ICD-10-CM

## 2021-04-16 PROCEDURE — 99422 OL DIG E/M SVC 11-20 MIN: CPT | Performed by: NURSE PRACTITIONER

## 2021-04-19 DIAGNOSIS — I47.10 SVT (SUPRAVENTRICULAR TACHYCARDIA) (H): ICD-10-CM

## 2021-04-19 RX ORDER — LIRAGLUTIDE 6 MG/ML
INJECTION SUBCUTANEOUS
Qty: 9 ML | Refills: 3 | Status: SHIPPED | OUTPATIENT
Start: 2021-04-19 | End: 2021-05-24

## 2021-04-19 NOTE — PATIENT INSTRUCTIONS
Thank you for choosing us for your care. I have placed an order for a prescription so that you can start treatment. View your full visit summary for details by clicking on the link below. Your pharmacist will able to address any questions you may have about the medication.     If you're not feeling better within 5-7 days, please schedule an appointment.  You can schedule an appointment right here in [facility], or call [phone]  If the visit is for the same symptoms as your eVisit, we'll refund the cost of your eVisit if seen within seven days.

## 2021-04-21 ENCOUNTER — E-VISIT (OUTPATIENT)
Dept: FAMILY MEDICINE | Facility: CLINIC | Age: 36
End: 2021-04-21
Payer: COMMERCIAL

## 2021-04-21 DIAGNOSIS — H66.90 EAR INFECTION: Primary | ICD-10-CM

## 2021-04-21 PROCEDURE — 99421 OL DIG E/M SVC 5-10 MIN: CPT | Performed by: NURSE PRACTITIONER

## 2021-04-21 RX ORDER — FLUOCINOLONE ACETONIDE 0.11 MG/ML
OIL TOPICAL 2 TIMES DAILY PRN
Qty: 118.28 ML | Refills: 0 | Status: SHIPPED | OUTPATIENT
Start: 2021-04-21 | End: 2021-04-26

## 2021-04-21 RX ORDER — OFLOXACIN 3 MG/ML
5 SOLUTION AURICULAR (OTIC) 2 TIMES DAILY
Qty: 3 ML | Refills: 0 | Status: SHIPPED | OUTPATIENT
Start: 2021-04-21 | End: 2021-04-26

## 2021-04-21 RX ORDER — VERAPAMIL HYDROCHLORIDE 120 MG/1
120 CAPSULE, EXTENDED RELEASE ORAL DAILY
Qty: 90 CAPSULE | Refills: 0 | Status: SHIPPED | OUTPATIENT
Start: 2021-04-21 | End: 2021-07-21

## 2021-04-21 NOTE — PATIENT INSTRUCTIONS
Thank you for choosing us for your care. I have placed an order for a prescription so that you can start treatment. View your full visit summary for details by clicking on the link below. Your pharmacist will able to address any questions you may have about the medication.     If you're not feeling better within 5-7 days, please schedule an appointment.  You can schedule an appointment right here in [facility], or call [phone]  If the visit is for the same symptoms as your eVisit, we'll refund the cost of your eVisit if seen within seven days.

## 2021-05-11 ENCOUNTER — E-VISIT (OUTPATIENT)
Dept: FAMILY MEDICINE | Facility: CLINIC | Age: 36
End: 2021-05-11
Payer: COMMERCIAL

## 2021-05-11 DIAGNOSIS — J01.90 ACUTE SINUSITIS WITH SYMPTOMS GREATER THAN 10 DAYS: ICD-10-CM

## 2021-05-11 DIAGNOSIS — J01.90 ACUTE BACTERIAL SINUSITIS: Primary | ICD-10-CM

## 2021-05-11 DIAGNOSIS — B96.89 ACUTE BACTERIAL SINUSITIS: Primary | ICD-10-CM

## 2021-05-11 PROCEDURE — 99421 OL DIG E/M SVC 5-10 MIN: CPT | Performed by: NURSE PRACTITIONER

## 2021-05-11 RX ORDER — LEVOFLOXACIN 750 MG/1
750 TABLET, FILM COATED ORAL DAILY
Qty: 7 TABLET | Refills: 0 | Status: SHIPPED | OUTPATIENT
Start: 2021-05-11 | End: 2021-07-06

## 2021-05-11 RX ORDER — PREDNISONE 20 MG/1
TABLET ORAL
Qty: 20 TABLET | Refills: 0 | Status: SHIPPED | OUTPATIENT
Start: 2021-05-11 | End: 2021-07-06

## 2021-05-11 NOTE — PATIENT INSTRUCTIONS
Dear Michelle Epstein    After reviewing your responses, I've been able to diagnose you with?a sinus infection caused by bacteria.?     Based on your responses and diagnosis, I have prescribed Levaquin and prednisone to treat your symptoms. I have sent this to your pharmacy.?     It is also important to stay well hydrated, get lots of rest and take over-the-counter decongestants,?tylenol?or ibuprofen if you?are able to?take those medications per your primary care provider to help relieve discomfort.?     It is important that you take?all of?your prescribed medication even if your symptoms are improving after a few doses.? Taking?all of?your medicine helps prevent the symptoms from returning.?     If your symptoms worsen, you develop severe headache, vomiting, high fever (>102), or are not improving in 7 days, please contact your primary care provider for an appointment or visit any of our convenient Walk-in Care or Urgent Care Centers to be seen which can be found on our website?here.?     Thanks again for choosing?us?as your health care partner,?   ?  Mindy Fink NP?

## 2021-05-17 DIAGNOSIS — J47.9 BRONCHIECTASIS WITHOUT ACUTE EXACERBATION (H): ICD-10-CM

## 2021-05-17 DIAGNOSIS — B37.31 YEAST INFECTION OF THE VAGINA: ICD-10-CM

## 2021-05-17 DIAGNOSIS — J47.9 ADULT BRONCHIECTASIS (H): ICD-10-CM

## 2021-05-17 DIAGNOSIS — R11.0 NAUSEA: ICD-10-CM

## 2021-05-17 RX ORDER — ALBUTEROL SULFATE 90 UG/1
AEROSOL, METERED RESPIRATORY (INHALATION)
Qty: 8.5 G | Refills: 0 | OUTPATIENT
Start: 2021-05-17

## 2021-05-17 RX ORDER — ONDANSETRON 4 MG/1
TABLET, FILM COATED ORAL
Qty: 30 TABLET | Refills: 0 | OUTPATIENT
Start: 2021-05-17

## 2021-05-17 NOTE — TELEPHONE ENCOUNTER
"Requested Prescriptions   Pending Prescriptions Disp Refills    fluconazole (DIFLUCAN) 150 MG tablet 4 tablet 0     Sig: Take 1 tablet (150 mg) by mouth every 72 hours as needed (vaginal itching) Until symptoms resolve.       Antifungal Agents Failed - 5/17/2021  5:42 PM        Failed - Not Fluconazole or Terconazole      If oral Fluconazole or Terconazole, may refill if indicated in progress notes.           Passed - Recent (12 mo) or future (30 days) visit within the authorizing provider's specialty     Patient has had an office visit with the authorizing provider or a provider within the authorizing providers department within the previous 12 mos or has a future within next 30 days. See \"Patient Info\" tab in inbasket, or \"Choose Columns\" in Meds & Orders section of the refill encounter.              Passed - Medication is active on med list             "

## 2021-05-18 ENCOUNTER — TELEPHONE (OUTPATIENT)
Dept: FAMILY MEDICINE | Facility: CLINIC | Age: 36
End: 2021-05-18

## 2021-05-18 DIAGNOSIS — J47.9 BRONCHIECTASIS (H): ICD-10-CM

## 2021-05-18 RX ORDER — LEVALBUTEROL INHALATION SOLUTION 1.25 MG/3ML
1 SOLUTION RESPIRATORY (INHALATION) 4 TIMES DAILY
Qty: 360 ML | Refills: 0 | OUTPATIENT
Start: 2021-05-18

## 2021-05-18 RX ORDER — FLUCONAZOLE 150 MG/1
150 TABLET ORAL
Qty: 4 TABLET | Refills: 0 | Status: SHIPPED | OUTPATIENT
Start: 2021-05-18 | End: 2021-07-06

## 2021-05-18 NOTE — TELEPHONE ENCOUNTER
PA required.   Obtain PA or change medications for liraglutide (VICTOZA) 18 MG/3ML solution ?    To complete the PA :  1.  Go to [email or web address] and click 'Enter a Key'  2.  Enter the patient's last name and  and the key.       Key: [id number]      Last Name:       :   3.  Complete the form and click 'Send to Plan'

## 2021-05-19 NOTE — TELEPHONE ENCOUNTER
Insurance does not require a PA for Victoza. I called the pharmacy and they stated that for the prescribed dosing that they would need this to be prescribed as Saxenda.

## 2021-05-19 NOTE — TELEPHONE ENCOUNTER
Central Prior Authorization Team   Phone: [phone]      PA Initiation    Medication: liraglutide (VICTOZA) 18 MG/3ML solution  Insurance Company: [employer] - Phone [phone] Fax [phone]  Pharmacy Filling the Rx: [id number] - [place] - [address]  Filling Pharmacy Phone: [phone]  Filling Pharmacy Fax:    Start Date: 5/19/2021

## 2021-05-21 DIAGNOSIS — M62.830 BACK MUSCLE SPASM: ICD-10-CM

## 2021-05-21 RX ORDER — METHOCARBAMOL 750 MG/1
750 TABLET, FILM COATED ORAL 3 TIMES DAILY PRN
Qty: 60 TABLET | Refills: 1 | Status: SHIPPED | OUTPATIENT
Start: 2021-05-21 | End: 2022-04-08

## 2021-05-24 ENCOUNTER — TELEPHONE (OUTPATIENT)
Facility: CLINIC | Age: 36
End: 2021-05-24

## 2021-05-24 DIAGNOSIS — E66.3 OVERWEIGHT: Primary | ICD-10-CM

## 2021-05-24 NOTE — TELEPHONE ENCOUNTER
Prior Authorization Retail Medication Request    Medication/Dose: Dupixent 300mg/2ML- PA expiring soon  ICD code (if different than what is on RX):    Previously Tried and Failed:    Rationale:      Insurance Name:    Insurance ID:        Pharmacy Information (if different than what is on RX)  Name:  [facility]   Phone:  [phone]

## 2021-05-25 ENCOUNTER — TELEPHONE (OUTPATIENT)
Dept: FAMILY MEDICINE | Facility: CLINIC | Age: 36
End: 2021-05-25

## 2021-05-25 NOTE — TELEPHONE ENCOUNTER
PA Initiation    Medication: SAXENDA 18 MG/3ML pen  Insurance Company: [employer] - Phone [phone] Fax [phone]  Pharmacy Filling the Rx: [id number] - [place]  [address]  Filling Pharmacy Phone: [phone]  Filling Pharmacy Fax: [phone]  Start Date: 5/25/2021    Unable to complete PA request via CM, faxed request to insurance.

## 2021-05-25 NOTE — TELEPHONE ENCOUNTER
Prior Authorization Retail Medication Request    Medication/Dose: SAXENDA 18 MG/3ML pen  ICD code (if different than what is on RX):    Previously Tried and Failed:    Rationale:     Insurance Name:  [facility]  Insurance ID:  [id number]      Pharmacy Information (if different than what is on RX)  Name:  [id number]   Phone:  [phone]

## 2021-05-26 NOTE — TELEPHONE ENCOUNTER
PRIOR AUTHORIZATION DENIED    Medication: SAXENDA 18 MG/3ML pen    Denial Date: 5/26/2021    Denial Rationale: Drugs or products to promote weight loss are excluded from coverage.        Appeal Information: N/A

## 2021-05-31 ENCOUNTER — MYC MEDICAL ADVICE (OUTPATIENT)
Dept: FAMILY MEDICINE | Facility: CLINIC | Age: 36
End: 2021-05-31

## 2021-06-03 ENCOUNTER — E-VISIT (OUTPATIENT)
Dept: FAMILY MEDICINE | Facility: CLINIC | Age: 36
End: 2021-06-03
Payer: COMMERCIAL

## 2021-06-03 DIAGNOSIS — E66.3 OVERWEIGHT: Primary | ICD-10-CM

## 2021-06-03 PROCEDURE — 99421 OL DIG E/M SVC 5-10 MIN: CPT | Performed by: NURSE PRACTITIONER

## 2021-06-04 RX ORDER — LIRAGLUTIDE 6 MG/ML
INJECTION SUBCUTANEOUS
Qty: 3 ML | Refills: 1 | Status: SHIPPED | OUTPATIENT
Start: 2021-06-04 | End: 2021-08-11

## 2021-06-07 ENCOUNTER — MYC MEDICAL ADVICE (OUTPATIENT)
Dept: FAMILY MEDICINE | Facility: CLINIC | Age: 36
End: 2021-06-07

## 2021-06-07 DIAGNOSIS — J47.9 ADULT BRONCHIECTASIS (H): ICD-10-CM

## 2021-06-07 DIAGNOSIS — R11.0 NAUSEA: ICD-10-CM

## 2021-06-07 NOTE — PATIENT INSTRUCTIONS
Thank you for choosing us for your care. I have placed an order for a prescription so that you can start treatment. View your full visit summary for details by clicking on the link below. Your pharmacist will able to address any questions you may have about the medication.     If you're not feeling better within 5-7 days, please schedule an appointment.  You can schedule an appointment right here in [facility], or call [phone]  If the visit is for the same symptoms as your eVisit, we'll refund the cost of your eVisit if seen within seven days.

## 2021-06-08 RX ORDER — ONDANSETRON 4 MG/1
4 TABLET, FILM COATED ORAL EVERY 8 HOURS PRN
Qty: 30 TABLET | Refills: 0 | Status: SHIPPED | OUTPATIENT
Start: 2021-06-08 | End: 2021-08-16

## 2021-07-06 ENCOUNTER — VIRTUAL VISIT (OUTPATIENT)
Dept: FAMILY MEDICINE | Facility: CLINIC | Age: 36
End: 2021-07-06
Payer: COMMERCIAL

## 2021-07-06 ENCOUNTER — E-VISIT (OUTPATIENT)
Dept: FAMILY MEDICINE | Facility: CLINIC | Age: 36
End: 2021-07-06
Payer: COMMERCIAL

## 2021-07-06 DIAGNOSIS — J32.9 SINUSITIS, UNSPECIFIED CHRONICITY, UNSPECIFIED LOCATION: Primary | ICD-10-CM

## 2021-07-06 DIAGNOSIS — E66.09 OBESITY DUE TO EXCESS CALORIES WITHOUT SERIOUS COMORBIDITY, UNSPECIFIED CLASSIFICATION: ICD-10-CM

## 2021-07-06 DIAGNOSIS — J01.90 ACUTE SINUSITIS WITH SYMPTOMS GREATER THAN 10 DAYS: Primary | ICD-10-CM

## 2021-07-06 PROCEDURE — 99207 PR NON-BILLABLE SERV PER CHARTING: CPT | Performed by: NURSE PRACTITIONER

## 2021-07-06 PROCEDURE — 99213 OFFICE O/P EST LOW 20 MIN: CPT | Mod: 95 | Performed by: NURSE PRACTITIONER

## 2021-07-06 RX ORDER — PREDNISONE 20 MG/1
TABLET ORAL
Qty: 20 TABLET | Refills: 0 | Status: SHIPPED | OUTPATIENT
Start: 2021-07-06 | End: 2021-10-06

## 2021-07-06 RX ORDER — LEVOFLOXACIN 750 MG/1
750 TABLET, FILM COATED ORAL DAILY
Qty: 10 TABLET | Refills: 0 | Status: SHIPPED | OUTPATIENT
Start: 2021-07-06 | End: 2021-10-06

## 2021-07-06 NOTE — PATIENT INSTRUCTIONS
Monitor for tendon pain in achilles or other tendons, if occurs stop Levaquin right away and let us know. This is a rare side effect but can be more common in women    Otherwise follow up with provider in 2-3 months for obesity management and victoza

## 2021-07-06 NOTE — PROGRESS NOTES
Michelle is a 36 year old who is being evaluated via a billable video visit.      How would you like to obtain your AVS? MyChart  If the video visit is dropped, the invitation should be resent by: Text to cell phone: [phone]  Will anyone else be joining your video visit? No    Video Start Time 4:58pm    Assessment & Plan     Acute sinusitis with symptoms greater than 10 days  Will treat, she will stop azithromycin while taking Levaquin. Call if not improving.   - levofloxacin (LEVAQUIN) 750 MG tablet; Take 1 tablet (750 mg) by mouth daily Stop azithromycin while taking this  - predniSONE (DELTASONE) 20 MG tablet; Take 3 tabs by mouth daily x 3 days, then 2 tabs daily x 3 days, then 1 tab daily x 3 days, then 1/2 tab daily x 3 days.    Obesity due to excess calories without serious comorbidity, unspecified classification  Going well, hasn't noted weight loss yet with victoza but is doing less boredom eating.  Continue for now, follow up with provider in 2-3 months, virtual visit okay        No follow-ups on file.    MIKE Chatterjee, NP-C  [facility]   Michelle is a 36 year old who presents for the following health issues  accompanied by her self:    History of Present Illness     Asthma:  She presents for follow up of asthma.  She has some cough, some wheezing, and no shortness of breath. She is using a relief medication daily. She does not miss any doses of her controller medication throughout the week.Patient is aware of the following triggers: unaware of any triggers. The patient has not had a visit to the Emergency Room, Urgent Care or Hospital due to asthma since the last clinic visit.     She eats 2-3 servings of fruits and vegetables daily.She consumes 2 sweetened beverage(s) daily.She exercises with enough effort to increase her heart rate 20 to 29 minutes per day.  She exercises with enough effort to increase her heart rate 3 or less days per week.   She is taking medications  regularly.       Answers for HPI/ROS submitted by the patient on 7/6/2021   Chronic problems general questions HPI Form  How many servings of fruits and vegetables do you eat daily?: 2-3  On average, how many sweetened beverages do you drink each day (Examples: soda, juice, sweet tea, etc.  Do NOT count diet or artificially sweetened beverages)?: 2  How many minutes a day do you exercise enough to make your heart beat faster?: 20 to 29  How many days a week do you exercise enough to make your heart beat faster?: 3 or less  How many days per week do you miss taking your medication?: 0  Do you have a cough?: Yes  Are you experiencing any wheezing in your chest?: Yes  Do you have any shortness of breath?: No  Use of rescue inhaler:: daily  Taking Asthma medication as prescribed:: 0  Asthma triggers:: unaware of any triggers  Have you had any Emergency Room visits, Urgent Care visits, or Hospital Admissions since your last office visit?: No        On vicotza, is decreasing appetite. Some nausea at times.  Hasn't noted weight changes yet, but isn't eating out of boredom. Just picked up 3 new pens.     Sinus symptoms ongoing for about a week. OTC medication not helping, starting to get teeth pain/discomfort which is when she knows it won't go away. Has some wheezing all the time but worse when she has sinus infection          Review of Systems   Constitutional, HEENT-as above, cardiovascular, pulmonary, systems are negative, except as otherwise noted.      Objective           Vitals:  No vitals were obtained today due to virtual visit.    Physical Exam   GENERAL: Healthy, alert and no distress  EYES: Eyes grossly normal to inspection.  No discharge or erythema, or obvious scleral/conjunctival abnormalities.  RESP: No audible wheeze, cough, or visible cyanosis.  No visible retractions or increased work of breathing.    SKIN: Visible skin clear. No significant rash, abnormal pigmentation or lesions.  NEURO: Cranial nerves  grossly intact.  Mentation and speech appropriate for age.  PSYCH: Mentation appears normal, affect normal/bright, judgement and insight intact, normal speech and appearance well-groomed.    Last labs in April, normal renal function            Video-Visit Details    Type of service:  Video Visit    Video End Time:5:04 PM    Originating Location (pt. Location): Home    Distant Location (provider location):    Home secure location    Platform used for Video Visit: TrevonWell

## 2021-07-19 ENCOUNTER — MYC MEDICAL ADVICE (OUTPATIENT)
Dept: FAMILY MEDICINE | Facility: CLINIC | Age: 36
End: 2021-07-19

## 2021-07-19 DIAGNOSIS — I47.10 SVT (SUPRAVENTRICULAR TACHYCARDIA) (H): ICD-10-CM

## 2021-07-19 DIAGNOSIS — F41.9 ANXIETY: ICD-10-CM

## 2021-07-19 DIAGNOSIS — J47.9 BRONCHIECTASIS WITHOUT COMPLICATION (H): ICD-10-CM

## 2021-07-19 DIAGNOSIS — E78.1 HYPERTRIGLYCERIDEMIA: ICD-10-CM

## 2021-07-19 DIAGNOSIS — F32.1 MODERATE SINGLE CURRENT EPISODE OF MAJOR DEPRESSIVE DISORDER (H): ICD-10-CM

## 2021-07-19 RX ORDER — AZITHROMYCIN 250 MG/1
TABLET, FILM COATED ORAL
Qty: 30 TABLET | Refills: 11 | Status: SHIPPED | OUTPATIENT
Start: 2021-07-19 | End: 2022-07-26

## 2021-07-19 RX ORDER — BUPROPION HYDROCHLORIDE 300 MG/1
300 TABLET ORAL EVERY MORNING
Qty: 90 TABLET | Refills: 1 | Status: CANCELLED | OUTPATIENT
Start: 2021-07-19

## 2021-07-20 RX ORDER — FENOFIBRATE 54 MG/1
TABLET ORAL
Qty: 90 TABLET | Refills: 0 | Status: SHIPPED | OUTPATIENT
Start: 2021-07-20 | End: 2022-01-03

## 2021-07-20 NOTE — TELEPHONE ENCOUNTER
Please send patient mychart message: she is due for getting fasting lipids done, order is in. 90 day refill of medication was sent.  Thank you,  MIKE Chatterjee, NP-C  [facility]

## 2021-07-20 NOTE — TELEPHONE ENCOUNTER
Routing refill request to provider for review/approval because:  Labs not current:  Lipids    No appointment pending at this time.  Routing to provider to advise.    Onelia MCCARTHYN, RN

## 2021-07-21 RX ORDER — VERAPAMIL HYDROCHLORIDE 120 MG/1
120 CAPSULE, EXTENDED RELEASE ORAL DAILY
Qty: 90 CAPSULE | Refills: 0 | Status: SHIPPED | OUTPATIENT
Start: 2021-07-21 | End: 2022-01-25

## 2021-07-21 NOTE — TELEPHONE ENCOUNTER
verapamil ER (VERELAN) 120 MG 24 hr capsule   Take 1 capsule (120 mg) by mouth daily      Last Written Prescription Date:  4/21/21  Last Fill Quantity: 90,   # refills: 0  Last Office Visit : 7/9/20  Future Office visit:  none    Routing refill request to provider for review/approval because:  Overdue visit. 90 day to pharmacy.     Scheduling has been notified to contact the pt for appointment.

## 2021-07-22 ENCOUNTER — MYC MEDICAL ADVICE (OUTPATIENT)
Dept: FAMILY MEDICINE | Facility: CLINIC | Age: 36
End: 2021-07-22

## 2021-07-22 DIAGNOSIS — F41.9 ANXIETY: ICD-10-CM

## 2021-07-22 DIAGNOSIS — F32.1 MODERATE SINGLE CURRENT EPISODE OF MAJOR DEPRESSIVE DISORDER (H): ICD-10-CM

## 2021-07-22 RX ORDER — BUPROPION HYDROCHLORIDE 300 MG/1
300 TABLET ORAL EVERY MORNING
Qty: 90 TABLET | Refills: 1 | OUTPATIENT
Start: 2021-07-22

## 2021-07-27 ENCOUNTER — MYC MEDICAL ADVICE (OUTPATIENT)
Dept: CARDIOLOGY | Facility: CLINIC | Age: 36
End: 2021-07-27

## 2021-08-05 PROCEDURE — 36415 COLL VENOUS BLD VENIPUNCTURE: CPT

## 2021-08-09 DIAGNOSIS — E66.3 OVERWEIGHT: ICD-10-CM

## 2021-08-10 ENCOUNTER — MYC MEDICAL ADVICE (OUTPATIENT)
Dept: FAMILY MEDICINE | Facility: CLINIC | Age: 36
End: 2021-08-10

## 2021-08-10 DIAGNOSIS — E78.1 HYPERTRIGLYCERIDEMIA: ICD-10-CM

## 2021-08-10 DIAGNOSIS — R53.83 OTHER FATIGUE: Primary | ICD-10-CM

## 2021-08-10 DIAGNOSIS — Z11.52 ENCOUNTER FOR SCREENING FOR COVID-19: ICD-10-CM

## 2021-08-11 RX ORDER — LIRAGLUTIDE 6 MG/ML
1.8 INJECTION SUBCUTANEOUS DAILY
Qty: 9 ML | Refills: 1 | Status: SHIPPED | OUTPATIENT
Start: 2021-08-11 | End: 2021-10-06

## 2021-08-11 NOTE — TELEPHONE ENCOUNTER
Routing refill request to provider for review/approval because:  Labs not current:  A1C      Noreen Viveros RN  [facility]

## 2021-08-11 NOTE — TELEPHONE ENCOUNTER
Called pt and LVM letting her know that labs have been placed and that she can call us to schedule lab only appt

## 2021-08-12 ENCOUNTER — TELEPHONE (OUTPATIENT)
Dept: CARDIOLOGY | Facility: CLINIC | Age: 36
End: 2021-08-12

## 2021-08-12 DIAGNOSIS — R53.83 OTHER FATIGUE: ICD-10-CM

## 2021-08-12 DIAGNOSIS — Z11.52 ENCOUNTER FOR SCREENING FOR COVID-19: ICD-10-CM

## 2021-08-12 DIAGNOSIS — E78.1 HYPERTRIGLYCERIDEMIA: ICD-10-CM

## 2021-08-12 LAB
CHOLEST SERPL-MCNC: 208 MG/DL
FASTING STATUS PATIENT QL REPORTED: ABNORMAL
HDLC SERPL-MCNC: 55 MG/DL
LDLC SERPL CALC-MCNC: 78 MG/DL
NONHDLC SERPL-MCNC: 153 MG/DL
TRIGL SERPL-MCNC: 374 MG/DL
TSH SERPL DL<=0.005 MIU/L-ACNC: 2.69 MU/L (ref 0.4–4)

## 2021-08-12 PROCEDURE — 82306 VITAMIN D 25 HYDROXY: CPT

## 2021-08-12 PROCEDURE — 80061 LIPID PANEL: CPT

## 2021-08-12 PROCEDURE — 36415 COLL VENOUS BLD VENIPUNCTURE: CPT

## 2021-08-12 PROCEDURE — 99000 SPECIMEN HANDLING OFFICE-LAB: CPT

## 2021-08-12 PROCEDURE — 84443 ASSAY THYROID STIM HORMONE: CPT

## 2021-08-12 PROCEDURE — 86769 SARS-COV-2 COVID-19 ANTIBODY: CPT | Mod: 90

## 2021-08-13 LAB — DEPRECATED CALCIDIOL+CALCIFEROL SERPL-MC: 73 UG/L (ref 20–75)

## 2021-08-14 LAB
SARS-COV-2 AB SERPL IA-ACNC: NORMAL [IU]/ML
SARS-COV-2 AB SERPL QL IA: NORMAL

## 2021-08-16 DIAGNOSIS — B37.31 YEAST INFECTION OF THE VAGINA: ICD-10-CM

## 2021-08-16 DIAGNOSIS — R11.0 NAUSEA: ICD-10-CM

## 2021-08-16 DIAGNOSIS — J47.9 ADULT BRONCHIECTASIS (H): ICD-10-CM

## 2021-08-16 LAB
SARS-COV-2 AB SERPL IA-ACNC: <0.4 U/ML
SARS-COV-2 AB SERPL QL IA: NEGATIVE

## 2021-08-16 RX ORDER — ONDANSETRON 4 MG/1
4 TABLET, FILM COATED ORAL EVERY 8 HOURS PRN
Qty: 30 TABLET | Refills: 0 | Status: SHIPPED | OUTPATIENT
Start: 2021-08-16 | End: 2021-12-06

## 2021-08-16 NOTE — RESULT ENCOUNTER NOTE
Hi Michelle,   Your COVID-19 antibodies are negative. Your Vit D/TSH are normal. The Triglycerides were high but I had already sent a message about that to you. Please let me know if you have questions.  Thank you,  MIKE Chatterjee, NP-C  [facility]

## 2021-08-16 NOTE — TELEPHONE ENCOUNTER
It has been over 7 days since last visit. Needs new visit for refill of fluconazole. Virtual or e-visit okay.  Thank you,  MIKE Chatterjee, NP-C  [facility]

## 2021-08-22 ENCOUNTER — APPOINTMENT (OUTPATIENT)
Dept: URGENT CARE | Facility: CLINIC | Age: 36
End: 2021-08-22
Payer: COMMERCIAL

## 2021-08-27 RX ORDER — FLUCONAZOLE 150 MG/1
150 TABLET ORAL
OUTPATIENT
Start: 2021-08-27

## 2021-08-27 NOTE — TELEPHONE ENCOUNTER
Per chart review, pt has viewed [employer] message. RN sent denial message to pharmacy to close refill request stating pt needs visit for this refill (as indicated in provider note below).    ELSY Mancuso, RN

## 2021-08-30 DIAGNOSIS — J47.9 ADULT BRONCHIECTASIS (H): ICD-10-CM

## 2021-08-30 DIAGNOSIS — N89.8 VAGINAL ITCHING: ICD-10-CM

## 2021-08-30 DIAGNOSIS — J45.909 ASTHMA: ICD-10-CM

## 2021-08-30 RX ORDER — LEVOFLOXACIN 750 MG/1
750 TABLET, FILM COATED ORAL DAILY
Qty: 21 TABLET | Refills: 0 | Status: SHIPPED | OUTPATIENT
Start: 2021-08-30 | End: 2021-10-06

## 2021-08-30 RX ORDER — FLUCONAZOLE 150 MG/1
150 TABLET ORAL ONCE
Qty: 1 TABLET | Refills: 0 | Status: SHIPPED | OUTPATIENT
Start: 2021-08-30 | End: 2021-08-30

## 2021-08-30 RX ORDER — PREDNISONE 10 MG/1
TABLET ORAL
Qty: 30 TABLET | Refills: 0 | Status: SHIPPED | OUTPATIENT
Start: 2021-08-30 | End: 2021-09-11

## 2021-08-30 NOTE — PROGRESS NOTES
See mychart message patient request 8/30/21 for medication while on upcoming vacation.    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 Luu. Per chart review, patient hasn't been seen since February 2020. Patient has upcoming appointment scheduled December 2021. Patient also being followed by PCP. Patient reached out to PCP for refill as well.     Dr. Luu reviewed patient's mychart and OK to refill patient's medications prior to upcoming appointment.     Refill request as follows per Dr. Luu:    1. Levaquin 750 mg daily x 21 days. Hold Azithromycin.  2. Prednisone taper: 40 mg x 3 day, 30 mg x 3 day, 20 mg x 3 days, 10 mg x 3 days  3. Diflucan 150 mg x 1 dose    Instructed patient needs to be seen as scheduled in December.     Sarai Felipe RN

## 2021-09-23 DIAGNOSIS — J47.9 BRONCHIECTASIS WITHOUT ACUTE EXACERBATION (H): ICD-10-CM

## 2021-09-23 RX ORDER — ALBUTEROL SULFATE 90 UG/1
AEROSOL, METERED RESPIRATORY (INHALATION)
Qty: 8.5 G | Refills: 11 | Status: SHIPPED | OUTPATIENT
Start: 2021-09-23 | End: 2022-10-10

## 2021-09-26 ENCOUNTER — HEALTH MAINTENANCE LETTER (OUTPATIENT)
Age: 36
End: 2021-09-26

## 2021-09-27 DIAGNOSIS — L70.0 ACNE VULGARIS: ICD-10-CM

## 2021-09-27 DIAGNOSIS — J47.9 BRONCHIECTASIS (H): ICD-10-CM

## 2021-09-27 RX ORDER — LEVALBUTEROL INHALATION SOLUTION 1.25 MG/3ML
1 SOLUTION RESPIRATORY (INHALATION) 4 TIMES DAILY
Qty: 360 ML | Refills: 0 | Status: SHIPPED | OUTPATIENT
Start: 2021-09-27 | End: 2021-10-06

## 2021-09-27 RX ORDER — CLINDAMYCIN PHOSPHATE, BENZOYL PEROXIDE 25; 10 MG/G; MG/G
GEL TOPICAL
Qty: 50 G | Refills: 1 | Status: SHIPPED | OUTPATIENT
Start: 2021-09-27 | End: 2022-01-05

## 2021-09-28 ENCOUNTER — TELEPHONE (OUTPATIENT)
Dept: PULMONOLOGY | Facility: CLINIC | Age: 36
End: 2021-09-28

## 2021-09-28 NOTE — TELEPHONE ENCOUNTER
Prior Authorization Retail Medication Request    Medication/Dose: levalbuterol (XOPENEX) 1.25 MG/3ML neb solution  ICD code (if different than what is on RX):  Bronchiectasis (H) [J47.9]  Previously Tried and Failed:    Rationale:      Insurance Name:  [employer]  Insurance ID:  [id number]    Pharmacy Information (if different than what is on RX)  Name:  [id number] [address]  Phone:  [phone]

## 2021-09-29 NOTE — TELEPHONE ENCOUNTER
Central Prior Authorization Team   Phone: [phone]    PA Initiation    Medication: levalbuterol (XOPENEX) 1.25 MG/3ML neb solution  Insurance Company: [employer] - Phone [phone] Fax [phone]  Pharmacy Filling the Rx: [id number] - [place] - [address]  Filling Pharmacy Phone: [phone]  Filling Pharmacy Fax: [phone]  Start Date: 9/29/2021

## 2021-09-29 NOTE — TELEPHONE ENCOUNTER
Prior Authorization Approval        Authorization Effective Date: 8/30/2021  Authorization Expiration Date: 9/29/2022  Medication: levalbuterol (XOPENEX) 1.25 MG/3ML neb solution-PA APPROVED   Approved Dose/Quantity:  Reference #:     Insurance Company: [employer] - Phone [phone] Fax [phone]  Expected CoPay:       CoPay Card Available:      Foundation Assistance Needed:    Which Pharmacy is filling the prescription (Not needed for infusion/clinic administered): [id number] - [place] - [address]  Pharmacy Notified: Yes- **Instructed pharmacy to notify patient when script is ready to /ship.**  Patient Notified: Yes

## 2021-10-02 NOTE — PROGRESS NOTES
SUBJECTIVE:   CC: Michelle Epstein is an 36 year old woman who presents for preventive health visit.     {Split Bill scripting  The purpose of this visit is to discuss your medical history and prevent health problems before you are sick. You may be responsible for a co-pay, coinsurance, or deductible if your visit today includes services such as checking on a sore throat, having an x-ray or lab test, or treating and evaluating a new or existing condition :255979}  Patient has been advised of split billing requirements and indicates understanding: {Yes and No:122616}  HPI  {Add if <65 person on Medicare  - Required Questions (Optional):568381}  {Outside tests to abstract? :193408}    {additional problems to add (Optional):171576}    Today's PHQ-2 Score:   PHQ-2 ( 1999 [employer]) 7/6/2021   Q1: Little interest or pleasure in doing things 0   Q2: Feeling down, depressed or hopeless 0   PHQ-2 Score 0   Q1: Little interest or pleasure in doing things Not at all   Q2: Feeling down, depressed or hopeless Not at all   PHQ-2 Score 0       Abuse: Current or Past (Physical, Sexual or Emotional) - { :539928}  Do you feel safe in your environment? { :625373}    Have you ever done Advance Care Planning? (For example, a Health Directive, POLST, or a discussion with a medical provider or your loved ones about your wishes): { :891109}    Social History     Tobacco Use     Smoking status: Never Smoker     Smokeless tobacco: Never Used     Tobacco comment: lives in nonsmoking household    Substance Use Topics     Alcohol use: Yes     Alcohol/week: 0.0 standard drinks     Comment: rare, not in PG     {Rooming Staff- Complete this question if Prescreen response is not shown below for today's visit. If you drink alcohol do you typically have >3 drinks per day or >7 drinks per week? (Optional):888097}    Alcohol Use 6/18/2019   Prescreen: >3 drinks/day or >7 drinks/week? -   Prescreen: >3 drinks/day or >7 drinks/week? { AUDIT responses  "all:TXT,23831}   {add AUDIT responses (Optional) (A score of 7 for adult men is an indication of hazardous drinking; a score of 8 or more is an indication of an alcohol use disorder.  A score of 7 or more for adult women is an indication of hazardous drinking or an alchohol use disorder):880087}    Reviewed orders with patient.  Reviewed health maintenance and updated orders accordingly - { :705425::\"Yes\"}  {Chronicprobdata (optional):089395}    Breast Cancer Screening:  Any new diagnosis of family breast, ovarian, or bowel cancer? {Yes_Link to Screening / No:877313}    FHS-7: No flowsheet data found.  {If any of the questions to the BCRA (FHS-7) are answered yes, consider ordering referral for genetic counseling (Optional) :705482::\"click delete button to remove this line now\"}  {AMB Mammogram Decision Support (Optional) :159977}  Pertinent mammograms are reviewed under the imaging tab.    History of abnormal Pap smear: { :296264}  PAP / HPV Latest Ref Rng & Units 1/27/2017 6/24/2013 5/28/2010   PAP (Historical) - NIL NIL NIL   HPV16 NEG Negative - -   HPV18 NEG Negative - -   HRHPV NEG Negative - -     Reviewed and updated as needed this visit by clinical staff                 Reviewed and updated as needed this visit by Provider                {HISTORY OPTIONS (Optional):191979}    Review of Systems  {FEMALE ROS (Optional):668774}     OBJECTIVE:   There were no vitals taken for this visit.  Physical Exam  {Exam Choices (Optional):592593}    {Diagnostic Test Results (Optional):788896::\"Diagnostic Test Results:\",\"Labs reviewed in Epic\"}    ASSESSMENT/PLAN:   {Diag Picklist:325021}    Patient has been advised of split billing requirements and indicates understanding: {YES / NO:577777::\"Yes\"}  COUNSELING:  {FEMALE COUNSELING MESSAGES:494830::\"Reviewed preventive health counseling, as reflected in patient instructions\"}    Estimated body mass index is 30.79 kg/m  as calculated from the following:    Height as of " "2/27/20: 1.651 m (5' 5\").    Weight as of 2/27/20: 83.9 kg (185 lb).    {Weight Management Plan (ACO) Complete if BMI is abnormal-  Ages 18-64  BMI >24.9.  Age 65+ with BMI <23 or >30 (Optional):403326}    She reports that she has never smoked. She has never used smokeless tobacco.      Counseling Resources:  ATP IV Guidelines  Pooled Cohorts Equation Calculator  Breast Cancer Risk Calculator  BRCA-Related Cancer Risk Assessment: FHS-7 Tool  FRAX Risk Assessment  ICSI Preventive Guidelines  Dietary Guidelines for Americans, 2010  USDA's MyPlate  ASA Prophylaxis  Lung CA Screening    Mindy Fink NP  [facility]  "

## 2021-10-02 NOTE — PATIENT INSTRUCTIONS
Preventive Health Recommendations  Female Ages 26 - 39  Yearly exam:   See your health care provider every year in order to    Review health changes.     Discuss preventive care.      Review your medicines if you your doctor has prescribed any.    Until age 30: Get a Pap test every three years (more often if you have had an abnormal result).    After age 30: Talk to your doctor about whether you should have a Pap test every 3 years or have a Pap test with HPV screening every 5 years.   You do not need a Pap test if your uterus was removed (hysterectomy) and you have not had cancer.  You should be tested each year for STDs (sexually transmitted diseases), if you're at risk.   Talk to your provider about how often to have your cholesterol checked.  If you are at risk for diabetes, you should have a diabetes test (fasting glucose).  Shots: Get a flu shot each year. Get a tetanus shot every 10 years.   Nutrition:     Eat at least 5 servings of fruits and vegetables each day.    Eat whole-grain bread, whole-wheat pasta and brown rice instead of white grains and rice.    Get adequate Calcium and Vitamin D.     Lifestyle    Exercise at least 150 minutes a week (30 minutes a day, 5 days of the week). This will help you control your weight and prevent disease.    Limit alcohol to one drink per day.    No smoking.     Wear sunscreen to prevent skin cancer.    See your dentist every six months for an exam and cleaning.    Preparing for Your Surgery  Getting started  A nurse will call you to review your health history and instructions. They will give you an arrival time based on your scheduled surgery time.  Please be ready to share the following:    Your doctor's clinic name and phone number    Your medical, surgical and anesthesia history    A list of allergies and sensitivities    A list of medicines, including herbal treatments and over-the-counter drugs    Whether the patient has a legal guardian (ask how to send us the  papers in advance)  If you have a child who's having surgery, please ask for a copy of Preparing for Your Child's Surgery.    Preparing for surgery    Within 30 days of surgery: Have a pre-op exam (sometimes called an H&P, or History and Physical). This can be done at a clinic or pre-operative center.  ? If you're having a , you may not need this exam. Talk to your care team    At your pre-op exam, talk to your care team about all medicines you take. If you need to stop any medicines before surgery, ask when to start taking them again.  ? We do this for your safety. Many medicines can make you bleed too much during surgery. Some change how well surgery (anesthesia) drugs work.    Call your insurance company to let them know you're having surgery. (If you don't have insurance, call [phone].)    Call your clinic if there's any change in your health. This includes signs of a cold or flu (sore throat, runny nose, cough, rash, fever). It also includes a scrape or scratch near the surgery site.    If you have questions on the day of surgery, call your hospital or surgery center.  Eating and drinking guidelines  For your safety: Unless your surgeon tells you otherwise, follow the guidelines below.    Eat and drink as usual until 8 hours before surgery. After that, no food or milk.    Drink clear liquids until 2 hours before surgery. These are liquids you can see through, like water, Gatorade and Propel Water. You may also have black coffee and tea (no cream or milk).    Nothing by mouth within 2 hours of surgery. This includes gum, candy and breath mints.    If you drink, stop drinking alcohol the night before surgery.    If your care team tells you to take medicine on the morning of surgery, it's okay to take it with a sip of water.  Preventing infection    Shower or bathe the night before and morning of your surgery. Follow the instructions your clinic gave you. (If no instructions, use regular  soap.)    Don't shave or clip hair near your surgery site. We'll remove the hair if needed.    Don't smoke or vape the morning of surgery. You may chew nicotine gum up to 2 hours before surgery. A nicotine patch is okay.  ? Note: Some surgeries require you to completely quit smoking and nicotine. Check with your surgeon.    Your care team will make every effort to keep you safe from infection. We will:  ? Clean our hands often with soap and water (or an alcohol-based hand rub).  ? Clean the skin at your surgery site with a special soap that kills germs.  ? Give you a special gown to keep you warm. (Cold raises the risk of infection.)  ? Wear special hair covers, masks, gowns and gloves during surgery.  ? Give antibiotic medicine, if prescribed. Not all surgeries need antibiotics.  What to bring on the day of surgery    Photo ID and insurance card    Copy of your health care directive, if you have one    Glasses and hearing aides (bring cases)  ? You can't wear contacts during surgery    Inhaler and eye drops, if you use them (tell us about these when you arrive)    CPAP machine or breathing device, if you use them    A few personal items, if spending the night    If you have . . .  ? A pacemaker or ICD (cardiac defibrillator): Bring the ID card.  ? An implanted stimulator: Bring the remote control.  ? A legal guardian: Bring a copy of the certified (court-stamped) guardianship papers.  Please remove any jewelry, including body piercings. Leave jewelry and other valuables at home.  If you're going home the day of surgery  Important: If you don't follow the rules below, we must cancel your surgery.     Arrange for someone to drive you home after surgery. You may not drive, take a taxi or take public transportation by yourself (unless you'll have local anesthesia only).    Arrange for a responsible adult to stay with you overnight. If you don't, we may keep you in the hospital overnight, and you may need to pay the  costs yourself.  Questions?   If you have any questions for your care team, list them here: _________________________________________________________________________________________________________________________________________________________________________________________________________________________________________________________________________________________________________________________  For informational purposes only. Not to replace the advice of your health care provider. Copyright   2003, 2019 [facility]. All rights reserved. Clinically reviewed by Ness Palmer MD. [employer] 398114 - REV 4/20.

## 2021-10-06 ENCOUNTER — OFFICE VISIT (OUTPATIENT)
Dept: FAMILY MEDICINE | Facility: CLINIC | Age: 36
End: 2021-10-06
Payer: COMMERCIAL

## 2021-10-06 VITALS
BODY MASS INDEX: 31.12 KG/M2 | OXYGEN SATURATION: 97 % | RESPIRATION RATE: 18 BRPM | DIASTOLIC BLOOD PRESSURE: 68 MMHG | TEMPERATURE: 98.1 F | HEART RATE: 110 BPM | SYSTOLIC BLOOD PRESSURE: 110 MMHG | WEIGHT: 187 LBS

## 2021-10-06 DIAGNOSIS — J47.9 ADULT BRONCHIECTASIS (H): ICD-10-CM

## 2021-10-06 DIAGNOSIS — F32.1 MODERATE SINGLE CURRENT EPISODE OF MAJOR DEPRESSIVE DISORDER (H): ICD-10-CM

## 2021-10-06 DIAGNOSIS — L73.9 HAIR FOLLICLE INFECTION: ICD-10-CM

## 2021-10-06 DIAGNOSIS — I47.10 PAROXYSMAL SUPRAVENTRICULAR TACHYCARDIA (H): ICD-10-CM

## 2021-10-06 DIAGNOSIS — F41.9 ANXIETY: ICD-10-CM

## 2021-10-06 DIAGNOSIS — K08.409 HISTORY OF THIRD MOLAR TOOTH EXTRACTION, UNSPECIFIED EDENTULISM CLASS: ICD-10-CM

## 2021-10-06 DIAGNOSIS — Z01.818 PREOP GENERAL PHYSICAL EXAM: Primary | ICD-10-CM

## 2021-10-06 PROCEDURE — 99214 OFFICE O/P EST MOD 30 MIN: CPT | Performed by: NURSE PRACTITIONER

## 2021-10-06 RX ORDER — MUPIROCIN 20 MG/G
OINTMENT TOPICAL 3 TIMES DAILY
Qty: 30 G | Refills: 1 | Status: SHIPPED | OUTPATIENT
Start: 2021-10-06 | End: 2021-10-13

## 2021-10-06 RX ORDER — BUPROPION HYDROCHLORIDE 300 MG/1
300 TABLET ORAL EVERY MORNING
Qty: 90 TABLET | Refills: 1 | Status: SHIPPED | OUTPATIENT
Start: 2021-10-06 | End: 2022-04-07

## 2021-10-06 ASSESSMENT — ANXIETY QUESTIONNAIRES
1. FEELING NERVOUS, ANXIOUS, OR ON EDGE: SEVERAL DAYS
1. FEELING NERVOUS, ANXIOUS, OR ON EDGE: SEVERAL DAYS
2. NOT BEING ABLE TO STOP OR CONTROL WORRYING: SEVERAL DAYS
GAD7 TOTAL SCORE: 4
3. WORRYING TOO MUCH ABOUT DIFFERENT THINGS: SEVERAL DAYS
5. BEING SO RESTLESS THAT IT IS HARD TO SIT STILL: NOT AT ALL
4. TROUBLE RELAXING: NOT AT ALL
3. WORRYING TOO MUCH ABOUT DIFFERENT THINGS: SEVERAL DAYS
GAD7 TOTAL SCORE: 4
7. FEELING AFRAID AS IF SOMETHING AWFUL MIGHT HAPPEN: SEVERAL DAYS
4. TROUBLE RELAXING: NOT AT ALL
6. BECOMING EASILY ANNOYED OR IRRITABLE: NOT AT ALL
8. IF YOU CHECKED OFF ANY PROBLEMS, HOW DIFFICULT HAVE THESE MADE IT FOR YOU TO DO YOUR WORK, TAKE CARE OF THINGS AT HOME, OR GET ALONG WITH OTHER PEOPLE?: NOT DIFFICULT AT ALL
GAD7 TOTAL SCORE: 4
7. FEELING AFRAID AS IF SOMETHING AWFUL MIGHT HAPPEN: SEVERAL DAYS
6. BECOMING EASILY ANNOYED OR IRRITABLE: NOT AT ALL
2. NOT BEING ABLE TO STOP OR CONTROL WORRYING: SEVERAL DAYS
5. BEING SO RESTLESS THAT IT IS HARD TO SIT STILL: NOT AT ALL
7. FEELING AFRAID AS IF SOMETHING AWFUL MIGHT HAPPEN: SEVERAL DAYS
GAD7 TOTAL SCORE: 4

## 2021-10-06 ASSESSMENT — ENCOUNTER SYMPTOMS
FREQUENCY: 0
NERVOUS/ANXIOUS: 0
ARTHRALGIAS: 0
FEVER: 0
ABDOMINAL PAIN: 0
HEMATURIA: 0
MYALGIAS: 0
WEAKNESS: 0
JOINT SWELLING: 0
COUGH: 1
DIZZINESS: 0
SHORTNESS OF BREATH: 0
HEADACHES: 0
HEMATOCHEZIA: 0
PALPITATIONS: 0
CHILLS: 0
EYE PAIN: 0
NAUSEA: 0
HEARTBURN: 0
BREAST MASS: 0
CONSTIPATION: 0
DIARRHEA: 0
PARESTHESIAS: 0
DYSURIA: 0
SORE THROAT: 0

## 2021-10-06 ASSESSMENT — PATIENT HEALTH QUESTIONNAIRE - PHQ9
10. IF YOU CHECKED OFF ANY PROBLEMS, HOW DIFFICULT HAVE THESE PROBLEMS MADE IT FOR YOU TO DO YOUR WORK, TAKE CARE OF THINGS AT HOME, OR GET ALONG WITH OTHER PEOPLE: NOT DIFFICULT AT ALL
SUM OF ALL RESPONSES TO PHQ QUESTIONS 1-9: 3
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
## 2021-10-06 NOTE — Clinical Note
Please fax note to Dr. Persaud at [phone]  Thank you,  MIKE Chatterjee, NP-C  [facility]
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
## 2021-10-07 ASSESSMENT — ANXIETY QUESTIONNAIRES: GAD7 TOTAL SCORE: 4

## 2021-10-07 ASSESSMENT — ASTHMA QUESTIONNAIRES: ACT_TOTALSCORE: 18

## 2021-10-07 ASSESSMENT — PATIENT HEALTH QUESTIONNAIRE - PHQ9: SUM OF ALL RESPONSES TO PHQ QUESTIONS 1-9: 3

## 2021-10-21 DIAGNOSIS — J47.9 BRONCHIECTASIS WITHOUT COMPLICATION (H): ICD-10-CM

## 2021-10-22 DIAGNOSIS — J47.9 BRONCHIECTASIS WITHOUT ACUTE EXACERBATION (H): ICD-10-CM

## 2021-10-22 RX ORDER — BUDESONIDE 1 MG/2ML
INHALANT ORAL
Qty: 120 ML | Refills: 1 | Status: SHIPPED | OUTPATIENT
Start: 2021-10-22 | End: 2023-02-06

## 2021-10-25 RX ORDER — ALBUTEROL SULFATE 0.83 MG/ML
SOLUTION RESPIRATORY (INHALATION)
Qty: 360 ML | Refills: 11 | Status: SHIPPED | OUTPATIENT
Start: 2021-10-25 | End: 2023-08-31

## 2021-10-26 ENCOUNTER — TELEPHONE (OUTPATIENT)
Dept: PULMONOLOGY | Facility: CLINIC | Age: 36
End: 2021-10-26

## 2021-10-26 NOTE — TELEPHONE ENCOUNTER
Prior Authorization Retail Medication Request    Medication/Dose: budesonide (PULMICORT) 1 MG/2ML neb solution  ICD code (if different than what is on RX):  Bronchiectasis without complication (H) [J47.9]   Previously Tried and Failed:    Rationale:      Insurance Name:  [employer]  Insurance ID:  [id number]    Pharmacy Information (if different than what is on RX)  Name: [id number] [address] [facility]  Phone:  [phone]

## 2021-10-27 NOTE — TELEPHONE ENCOUNTER
Prior Authorization Not Needed per Insurance        Medication: budesonide (PULMICORT) 1 MG/2ML neb solution-PA NOT NEEDED   Insurance Company: [employer] - Phone [phone] Fax [phone]  Expected CoPay:      Pharmacy Filling the Rx: [id number] - [place] - [address] [facility] [facility]  Pharmacy Notified: Yes  Patient Notified: No    Called pharmacy and pharmacy stated that PA is Not Needed and medication is covered. **Instructed pharmacy to notify patient when script is ready to /ship.** Pharmacy stated that they have a paid claim on medication on 10/25/2021 and patient has picked up medication. Insurance also stated that PA is Not Needed and medication is covered.

## 2021-10-31 ENCOUNTER — E-VISIT (OUTPATIENT)
Dept: FAMILY MEDICINE | Facility: CLINIC | Age: 36
End: 2021-10-31
Payer: COMMERCIAL

## 2021-10-31 DIAGNOSIS — J01.90 ACUTE BACTERIAL SINUSITIS: Primary | ICD-10-CM

## 2021-10-31 DIAGNOSIS — B96.89 ACUTE BACTERIAL SINUSITIS: Primary | ICD-10-CM

## 2021-10-31 PROCEDURE — 99421 OL DIG E/M SVC 5-10 MIN: CPT | Performed by: NURSE PRACTITIONER

## 2021-11-01 RX ORDER — LEVOFLOXACIN 500 MG/1
500 TABLET, FILM COATED ORAL DAILY
Qty: 10 TABLET | Refills: 0 | Status: SHIPPED | OUTPATIENT
Start: 2021-11-01 | End: 2021-11-11

## 2021-11-01 RX ORDER — PREDNISONE 20 MG/1
TABLET ORAL
Qty: 20 TABLET | Refills: 0 | Status: SHIPPED | OUTPATIENT
Start: 2021-11-01 | End: 2021-12-06

## 2021-11-01 NOTE — PATIENT INSTRUCTIONS
Dear Michelle Epstein    After reviewing your responses, I've been able to diagnose you with?a sinus infection caused by bacteria.?     Based on your responses and diagnosis, I have prescribed levoquin and predisone to treat your symptoms. I have sent this to your pharmacy.?     It is also important to stay well hydrated, get lots of rest and take over-the-counter decongestants,?tylenol?or ibuprofen if you?are able to?take those medications per your primary care provider to help relieve discomfort.?     It is important that you take?all of?your prescribed medication even if your symptoms are improving after a few doses.? Taking?all of?your medicine helps prevent the symptoms from returning.?     If your symptoms worsen, you develop severe headache, vomiting, high fever (>102), or are not improving in 7 days, please contact your primary care provider for an appointment or visit any of our convenient Walk-in Care or Urgent Care Centers to be seen which can be found on our website?here.?     Thanks again for choosing?us?as your health care partner,?   ?  Mindy Fink NP?

## 2021-11-03 RX ORDER — FLUCONAZOLE 150 MG/1
TABLET ORAL
Qty: 1 TABLET | Refills: 0 | OUTPATIENT
Start: 2021-11-03

## 2021-11-19 DIAGNOSIS — Z30.011 ENCOUNTER FOR INITIAL PRESCRIPTION OF CONTRACEPTIVE PILLS: ICD-10-CM

## 2021-11-19 RX ORDER — DROSPIRENONE AND ETHINYL ESTRADIOL 0.02-3(28)
1 KIT ORAL DAILY
Qty: 84 TABLET | Refills: 0 | Status: SHIPPED | OUTPATIENT
Start: 2021-11-19 | End: 2022-01-12

## 2021-11-30 ASSESSMENT — ENCOUNTER SYMPTOMS
PALPITATIONS: 1
WHEEZING: 1
SNORES LOUDLY: 0
SPUTUM PRODUCTION: 1
POSTURAL DYSPNEA: 1
DYSPNEA ON EXERTION: 1
SHORTNESS OF BREATH: 0
LIGHT-HEADEDNESS: 0
COUGH DISTURBING SLEEP: 0
COUGH: 1
SLEEP DISTURBANCES DUE TO BREATHING: 0
SYNCOPE: 0
HEMOPTYSIS: 0
HYPERTENSION: 0
ORTHOPNEA: 0
LEG PAIN: 0
EXERCISE INTOLERANCE: 0
HYPOTENSION: 0

## 2021-12-06 ENCOUNTER — LAB (OUTPATIENT)
Dept: LAB | Facility: CLINIC | Age: 36
End: 2021-12-06
Payer: COMMERCIAL

## 2021-12-06 ENCOUNTER — OFFICE VISIT (OUTPATIENT)
Dept: PULMONOLOGY | Facility: CLINIC | Age: 36
End: 2021-12-06
Payer: COMMERCIAL

## 2021-12-06 ENCOUNTER — OFFICE VISIT (OUTPATIENT)
Dept: TRANSPLANT | Facility: CLINIC | Age: 36
End: 2021-12-06
Payer: COMMERCIAL

## 2021-12-06 VITALS
BODY MASS INDEX: 29.95 KG/M2 | SYSTOLIC BLOOD PRESSURE: 119 MMHG | OXYGEN SATURATION: 97 % | WEIGHT: 180 LBS | DIASTOLIC BLOOD PRESSURE: 77 MMHG | HEART RATE: 83 BPM

## 2021-12-06 VITALS
RESPIRATION RATE: 18 BRPM | HEIGHT: 65 IN | BODY MASS INDEX: 29.99 KG/M2 | OXYGEN SATURATION: 97 % | HEART RATE: 83 BPM | DIASTOLIC BLOOD PRESSURE: 77 MMHG | SYSTOLIC BLOOD PRESSURE: 119 MMHG | WEIGHT: 180 LBS

## 2021-12-06 DIAGNOSIS — J45.50 SEVERE PERSISTENT ASTHMA, UNSPECIFIED WHETHER COMPLICATED (H): ICD-10-CM

## 2021-12-06 DIAGNOSIS — J45.50 SEVERE PERSISTENT ASTHMA, UNSPECIFIED WHETHER COMPLICATED (H): Primary | ICD-10-CM

## 2021-12-06 DIAGNOSIS — J47.9 BRONCHIECTASIS (H): Primary | ICD-10-CM

## 2021-12-06 DIAGNOSIS — J47.9 ADULT BRONCHIECTASIS (H): Primary | ICD-10-CM

## 2021-12-06 LAB
BASOPHILS # BLD AUTO: 0.1 10E3/UL (ref 0–0.2)
BASOPHILS NFR BLD AUTO: 1 %
EOSINOPHIL # BLD AUTO: 0.4 10E3/UL (ref 0–0.7)
EOSINOPHIL NFR BLD AUTO: 5 %
ERYTHROCYTE [DISTWIDTH] IN BLOOD BY AUTOMATED COUNT: 13.5 % (ref 10–15)
EXPTIME-PRE: 6.24 SEC
FEF2575-%PRED-PRE: 76 %
FEF2575-PRE: 2.57 L/SEC
FEF2575-PRED: 3.38 L/SEC
FEFMAX-%PRED-PRE: 108 %
FEFMAX-PRE: 7.77 L/SEC
FEFMAX-PRED: 7.16 L/SEC
FEV1-%PRED-PRE: 93 %
FEV1-PRE: 2.99 L
FEV1FEV6-PRE: 78 %
FEV1FEV6-PRED: 84 %
FEV1FVC-PRE: 78 %
FEV1FVC-PRED: 83 %
FIFMAX-PRE: 3.77 L/SEC
FVC-%PRED-PRE: 98 %
FVC-PRE: 3.82 L
FVC-PRED: 3.87 L
HCT VFR BLD AUTO: 42.4 % (ref 35–47)
HGB BLD-MCNC: 13.7 G/DL (ref 11.7–15.7)
IMM GRANULOCYTES # BLD: 0 10E3/UL
IMM GRANULOCYTES NFR BLD: 0 %
LYMPHOCYTES # BLD AUTO: 2.1 10E3/UL (ref 0.8–5.3)
LYMPHOCYTES NFR BLD AUTO: 24 %
MCH RBC QN AUTO: 29.4 PG (ref 26.5–33)
MCHC RBC AUTO-ENTMCNC: 32.3 G/DL (ref 31.5–36.5)
MCV RBC AUTO: 91 FL (ref 78–100)
MONOCYTES # BLD AUTO: 0.6 10E3/UL (ref 0–1.3)
MONOCYTES NFR BLD AUTO: 6 %
NEUTROPHILS # BLD AUTO: 5.9 10E3/UL (ref 1.6–8.3)
NEUTROPHILS NFR BLD AUTO: 64 %
NRBC # BLD AUTO: 0 10E3/UL
NRBC BLD AUTO-RTO: 0 /100
PLATELET # BLD AUTO: 318 10E3/UL (ref 150–450)
RBC # BLD AUTO: 4.66 10E6/UL (ref 3.8–5.2)
WBC # BLD AUTO: 9.1 10E3/UL (ref 4–11)

## 2021-12-06 PROCEDURE — G0463 HOSPITAL OUTPT CLINIC VISIT: HCPCS

## 2021-12-06 PROCEDURE — 86003 ALLG SPEC IGE CRUDE XTRC EA: CPT | Mod: 90 | Performed by: PATHOLOGY

## 2021-12-06 PROCEDURE — 99207 PR NO BILLABLE SERVICE THIS VISIT: CPT | Mod: GC | Performed by: INTERNAL MEDICINE

## 2021-12-06 PROCEDURE — 94375 RESPIRATORY FLOW VOLUME LOOP: CPT | Performed by: INTERNAL MEDICINE

## 2021-12-06 PROCEDURE — 85025 COMPLETE CBC W/AUTO DIFF WBC: CPT | Performed by: PATHOLOGY

## 2021-12-06 PROCEDURE — 99214 OFFICE O/P EST MOD 30 MIN: CPT | Mod: 25

## 2021-12-06 PROCEDURE — 99000 SPECIMEN HANDLING OFFICE-LAB: CPT | Performed by: PATHOLOGY

## 2021-12-06 PROCEDURE — 36415 COLL VENOUS BLD VENIPUNCTURE: CPT | Performed by: PATHOLOGY

## 2021-12-06 PROCEDURE — G0463 HOSPITAL OUTPT CLINIC VISIT: HCPCS | Mod: 25

## 2021-12-06 PROCEDURE — 82785 ASSAY OF IGE: CPT | Mod: 90 | Performed by: PATHOLOGY

## 2021-12-06 ASSESSMENT — ASTHMA QUESTIONNAIRES
ACT_TOTALSCORE: 21
QUESTION_5 LAST FOUR WEEKS HOW WOULD YOU RATE YOUR ASTHMA CONTROL: COMPLETELY CONTROLLED
ACUTE_EXACERBATION_TODAY: NO
QUESTION_2 LAST FOUR WEEKS HOW OFTEN HAVE YOU HAD SHORTNESS OF BREATH: ONCE OR TWICE A WEEK
QUESTION_3 LAST FOUR WEEKS HOW OFTEN DID YOUR ASTHMA SYMPTOMS (WHEEZING, COUGHING, SHORTNESS OF BREATH, CHEST TIGHTNESS OR PAIN) WAKE YOU UP AT NIGHT OR EARLIER THAN USUAL IN THE MORNING: NOT AT ALL
QUESTION_1 LAST FOUR WEEKS HOW MUCH OF THE TIME DID YOUR ASTHMA KEEP YOU FROM GETTING AS MUCH DONE AT WORK, SCHOOL OR AT HOME: NONE OF THE TIME
QUESTION_4 LAST FOUR WEEKS HOW OFTEN HAVE YOU USED YOUR RESCUE INHALER OR NEBULIZER MEDICATION (SUCH AS ALBUTEROL): ONE OR TWO TIMES PER DAY

## 2021-12-06 ASSESSMENT — MIFFLIN-ST. JEOR: SCORE: 1502.35

## 2021-12-06 ASSESSMENT — PAIN SCALES - GENERAL: PAINLEVEL: NO PAIN (0)

## 2021-12-06 NOTE — NURSING NOTE
Chief Complaint   Patient presents with     Bronchiectasis     Follow up     Prosper Estrada CMA CMA at 8:28 AM on 12/6/2021

## 2021-12-06 NOTE — PATIENT INSTRUCTIONS
Self-Care Plan    RECOMMENDATIONS:   Continue current medication, exercise, nebs and vest therapy.   Consider COVID vaccine          [facility] Nurse line:  Ayesha Henriquez  [phone]     [facility] Fax Number:      [phone]         Cystic Fibrosis Respiratory Therapists:   Spring Mancini              [phone]          Yanci Valdez   [phone]           MRN: [id number]   Clinic Date: December 6, 2021   Patient: Michelle Epstein     Annual Studies:   IGG   Date Value Ref Range Status   02/25/2011 1200 695 - 1620 mg/dL Final     No results found for: INS  There are no preventive care reminders to display for this patient.    Pulmonary Function Tests  FEV1: amount of air you can blow out in 1 second  FVC: total amount of air you can take in and blow out    Your Goals:         PFT Latest Ref Rng & Units 12/6/2021   FVC L 3.82   FEV1 L 2.99   FVC% % 98   FEV1% % 93          Airway Clearance: The Most Important Way to Keep Your Lungs Healthy  Vest Settings:    Hill-Rom Frequencies: 8, 9, 10 Pressure 10 Then, Frequencies 18, 19, 20 Pressure 6      RespirTech: Quick Start with Pressure of     Do each frequency for 5 minutes; Deflate vest after each frequency & cough 3 times before beginning the next setting.    Vest and Neb Therapy should be done 2 times/day.
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
## 2021-12-06 NOTE — NURSING NOTE
Chief Complaint   Patient presents with     RECHECK     follow up bronchiectasis       /77   Pulse 83   Wt 81.6 kg (180 lb)   SpO2 97%   BMI 29.95 kg/m        Jessi HENNING CMA

## 2021-12-07 LAB

## 2021-12-07 ASSESSMENT — ASTHMA QUESTIONNAIRES: ACT_TOTALSCORE: 21

## 2021-12-16 DIAGNOSIS — J45.50 SEVERE PERSISTENT ASTHMA (H): ICD-10-CM

## 2021-12-16 RX ORDER — DUPILUMAB 300 MG/2ML
300 INJECTION, SOLUTION SUBCUTANEOUS
Qty: 2 ML | Refills: 26 | Status: CANCELLED | OUTPATIENT
Start: 2021-12-16

## 2021-12-21 ENCOUNTER — E-VISIT (OUTPATIENT)
Dept: FAMILY MEDICINE | Facility: CLINIC | Age: 36
End: 2021-12-21
Payer: COMMERCIAL

## 2021-12-21 DIAGNOSIS — B96.89 ACUTE BACTERIAL SINUSITIS: Primary | ICD-10-CM

## 2021-12-21 DIAGNOSIS — J01.90 ACUTE BACTERIAL SINUSITIS: Primary | ICD-10-CM

## 2021-12-21 PROCEDURE — 99421 OL DIG E/M SVC 5-10 MIN: CPT | Performed by: NURSE PRACTITIONER

## 2021-12-21 RX ORDER — LEVOFLOXACIN 750 MG/1
750 TABLET, FILM COATED ORAL DAILY
Qty: 10 TABLET | Refills: 0 | Status: SHIPPED | OUTPATIENT
Start: 2021-12-21 | End: 2022-07-05

## 2021-12-21 RX ORDER — PREDNISONE 20 MG/1
TABLET ORAL
Qty: 20 TABLET | Refills: 0 | Status: SHIPPED | OUTPATIENT
Start: 2021-12-21 | End: 2022-05-03

## 2021-12-21 NOTE — PATIENT INSTRUCTIONS
Sinusitis (Antibiotic Treatment)    The sinuses are air-filled spaces within the bones of the face. They connect to the inside of the nose. Sinusitis is an inflammation of the tissue that lines the sinuses. Sinusitis can occur during a cold. It can also happen due to allergies to pollens and other particles in the air. Sinusitis can cause symptoms of sinus congestion and a feeling of fullness. A sinus infection causes fever, headache, and facial pain. There is often green or yellow fluid draining from the nose or into the back of the throat (post-nasal drip). You have been given antibiotics to treat this condition.   Home care    Take the full course of antibiotics as instructed. Don't stop taking them, even when you feel better.    Drink plenty of water, hot tea, and other liquids as directed by the healthcare provider. This may help thin nasal mucus. It also may help your sinuses drain fluids.    Heat may help soothe painful areas of your face. Use a towel soaked in hot water. Or,  the shower and direct the warm spray onto your face. Using a vaporizer along with a menthol rub at night may also help soothe symptoms.     An expectorant with guaifenesin may help thin nasal mucus and help your sinuses drain fluids. Talk with your provider or pharmacists before taking an over-the-counter (OTC) medicine if you have any questions about it or its side effects..    You can use an OTC decongestant, unless a similar medicine was prescribed to you. Nasal sprays work the fastest. Use one that contains phenylephrine or oxymetazoline. First blow your nose gently. Then use the spray. Don't use these medicines more often than directed on the label. If you do, your symptoms may get worse. You may also take pills that contain pseudoephedrine. Don t use products that combine multiple medicines. This is because side effects may be increased. Read labels. You can also ask the pharmacist for help. (People with high blood  pressure should not use decongestants. They can raise blood pressure.) Talk with your provider or pharmacist if you have any questions about the medicine..    OTC antihistamines may help if allergies contributed to your sinusitis. Talk with your provider or pharmacist if you have any questions about the medicine..    Don't use nasal rinses or irrigation during an acute sinus infection, unless your healthcare provider tells you to. Rinsing may spread the infection to other areas in your sinuses.    Use acetaminophen or ibuprofen to control pain, unless another pain medicine was prescribed to you. If you have chronic liver or kidney disease or ever had a stomach ulcer, talk with your healthcare provider before using these medicines. Never give aspirin to anyone under age 18 who is ill with a fever. It may cause severe liver damage.    Don't smoke. This can make symptoms worse.    Follow-up care  Follow up with your healthcare provider, or as advised.   When to seek medical advice  Call your healthcare provider if any of these occur:     Facial pain or headache that gets worse    Stiff neck    Unusual drowsiness or confusion    Swelling of your forehead or eyelids    Symptoms don't go away in 10 days    Vision problems, such as blurred or double vision    Fever of 100.4 F (38 C) or higher, or as directed by your healthcare provider  Call 911  Call 911 if any of these occur:     Seizure    Trouble breathing    Feeling dizzy or faint    Fingernails, skin or lips look blue, purple , or gray  Prevention  Here are steps you can take to help prevent an infection:     Keep good hand washing habits.    Don t have close contact with people who have sore throats, colds, or other upper respiratory infections.    Don t smoke, and stay away from secondhand smoke.    Stay up to date with of your vaccines.  [employer] last reviewed this educational content on 12/1/2019 2000-2021 [employer]. All rights reserved. This  information is not intended as a substitute for professional medical care. Always follow your healthcare professional's instructions.        Dear Michelle Epstein    After reviewing your responses, I've been able to diagnose you with?a sinus infection caused by bacteria.?     Based on your responses and diagnosis, I have prescribed levoquin and prednisone to treat your symptoms. I have sent this to your pharmacy.?     It is also important to stay well hydrated, get lots of rest and take over-the-counter decongestants,?tylenol?or ibuprofen if you?are able to?take those medications per your primary care provider to help relieve discomfort.?     It is important that you take?all of?your prescribed medication even if your symptoms are improving after a few doses.? Taking?all of?your medicine helps prevent the symptoms from returning.?     If your symptoms worsen, you develop severe headache, vomiting, high fever (>102), or are not improving in 7 days, please contact your primary care provider for an appointment or visit any of our convenient Walk-in Care or Urgent Care Centers to be seen which can be found on our website?here.?     Thanks again for choosing?us?as your health care partner,?   ?  Mindy Fink NP?

## 2021-12-28 ENCOUNTER — TELEPHONE (OUTPATIENT)
Dept: PULMONOLOGY | Facility: CLINIC | Age: 36
End: 2021-12-28
Payer: COMMERCIAL

## 2021-12-28 NOTE — TELEPHONE ENCOUNTER
Left voicemail for patient to contact me (direct number provided) or the call center (number provided) to discuss scheduling an appointment as records indicate they are due for a follow up visit with Dr. Lemos for Dec 2022 after recently being seen. As they do have a [employer] account, informed them that I would send them a message detailing the same information.

## 2021-12-28 NOTE — TELEPHONE ENCOUNTER
Left voicemail for patient to contact me (direct number provided) or the call center (number provided) to discuss scheduling an appointment as records indicate they are due for a follow up visit with Dr. Luu with a PFT prior in approximately 6 months (June 2022) after recently being seen. As they do have a [employer] account, informed them that I would send them a message detailing the same information.

## 2021-12-29 DIAGNOSIS — I47.10 SVT (SUPRAVENTRICULAR TACHYCARDIA) (H): ICD-10-CM

## 2022-01-04 NOTE — TELEPHONE ENCOUNTER
VERAPAMIL HCL SR 120MG CP24  Last Written Prescription Date:  7/21/2021  Last Fill Quantity: 90,   # refills: 0  Last Office Visit :  7/9/2020  Future Office visit:  None    Routing refill request to provider for review/approval because:  Second Request,     Over due office visit.      30 day pended.   Clinic notified       Maura Ngo RN  Central Triage Red Flags/Med Refills

## 2022-01-07 RX ORDER — VERAPAMIL HYDROCHLORIDE 120 MG/1
120 CAPSULE, EXTENDED RELEASE ORAL DAILY
Qty: 30 CAPSULE | OUTPATIENT
Start: 2022-01-07

## 2022-01-10 DIAGNOSIS — Z30.011 ENCOUNTER FOR INITIAL PRESCRIPTION OF CONTRACEPTIVE PILLS: ICD-10-CM

## 2022-01-12 RX ORDER — DROSPIRENONE AND ETHINYL ESTRADIOL 0.02-3(28)
1 KIT ORAL DAILY
Qty: 84 TABLET | Refills: 0 | Status: SHIPPED | OUTPATIENT
Start: 2022-01-12 | End: 2022-05-03

## 2022-01-12 NOTE — TELEPHONE ENCOUNTER
Duplicate order prescription filled on 1/12/2022 with 84 tablets and 1 refills.   Maryanne Snow RN, BSN   [facility]

## 2022-01-16 ENCOUNTER — HEALTH MAINTENANCE LETTER (OUTPATIENT)
Age: 37
End: 2022-01-16

## 2022-01-25 ENCOUNTER — VIRTUAL VISIT (OUTPATIENT)
Dept: CARDIOLOGY | Facility: CLINIC | Age: 37
End: 2022-01-25
Attending: INTERNAL MEDICINE
Payer: COMMERCIAL

## 2022-01-25 DIAGNOSIS — I47.10 SVT (SUPRAVENTRICULAR TACHYCARDIA) (H): ICD-10-CM

## 2022-01-25 PROCEDURE — 99214 OFFICE O/P EST MOD 30 MIN: CPT | Mod: 95 | Performed by: INTERNAL MEDICINE

## 2022-01-25 RX ORDER — VERAPAMIL HYDROCHLORIDE 120 MG/1
120 CAPSULE, EXTENDED RELEASE ORAL DAILY
Qty: 90 CAPSULE | Refills: 3 | Status: SHIPPED | OUTPATIENT
Start: 2022-01-25 | End: 2023-01-23

## 2022-01-25 NOTE — NURSING NOTE
Chief Complaint   Patient presents with     Follow Up     per pt f/u for prescription refill        Patient denies any changes since echeck-in regarding medication and allergies and states all information entered during echeck-in remains accurate.    Juani Acosta, ZANDER/CMA
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
## 2022-01-26 NOTE — PATIENT INSTRUCTIONS
"You were seen today in the Cardiovascular Clinic at the [facility].     Cardiology Providers you saw during your visit: Dr. Fely Denise    Diagnosis:  Supraventricular tachycardia    Results: discussed with patient    Orders:   None    Medication Changes:   None - verapamil  mg every day refilled for another year    Recommendations:   None    Follow-up:   1 year, video ok    Please follow up with primary care provider for medication refills         Please feel free to call me with any questions or concerns.       Florence Fuentes RN     Questions and [phone].   First press #1 for the [employer] and then press #3 for \"Medical Questions\" to reach us Cardiology Nurses.      On Call Cardiologist for after hours or on weekends: [phone]   option #4 and ask to speak to the on-call Cardiologist.          If you need a medication refill please contact your pharmacy.  Please allow 3 business days for your refill to be completed.    "

## 2022-02-01 NOTE — NURSING NOTE
No changes made at this visit. Continue current medications.     Follow up with Dr. Denise in 1 year, virtual okay.     Horace Knowles, RN   Cardiology Nurse Coordinator

## 2022-03-17 DIAGNOSIS — J47.9 BRONCHIECTASIS (H): ICD-10-CM

## 2022-03-17 RX ORDER — LEVOFLOXACIN 750 MG/1
TABLET, FILM COATED ORAL
Qty: 21 TABLET | Refills: 0 | OUTPATIENT
Start: 2022-03-17

## 2022-04-05 DIAGNOSIS — F41.9 ANXIETY: ICD-10-CM

## 2022-04-05 DIAGNOSIS — F32.1 MODERATE SINGLE CURRENT EPISODE OF MAJOR DEPRESSIVE DISORDER (H): ICD-10-CM

## 2022-04-07 ENCOUNTER — TELEPHONE (OUTPATIENT)
Dept: FAMILY MEDICINE | Facility: CLINIC | Age: 37
End: 2022-04-07
Payer: COMMERCIAL

## 2022-04-07 DIAGNOSIS — M62.830 BACK MUSCLE SPASM: ICD-10-CM

## 2022-04-07 RX ORDER — BUPROPION HYDROCHLORIDE 300 MG/1
300 TABLET ORAL EVERY MORNING
Qty: 90 TABLET | Refills: 0 | Status: SHIPPED | OUTPATIENT
Start: 2022-04-07 | End: 2022-05-03

## 2022-04-07 NOTE — TELEPHONE ENCOUNTER
"Routing refill request to provider for review/approval because:  PHQ 9    Requested Prescriptions   Pending Prescriptions Disp Refills    buPROPion (WELLBUTRIN XL) 300 MG 24 hr tablet 90 tablet 1     Sig: Take 1 tablet (300 mg) by mouth every morning        SSRIs Protocol Failed - 4/5/2022  3:41 PM        Failed - PHQ-9 score less than 5 in past 6 months     Please review last PHQ-9 score.           Passed - Medication is Bupropion     If the medication is Bupropion (Wellbutrin), and the patient is taking for smoking cessation; OK to refill.            Passed - Medication is active on med list        Passed - Patient is age 18 or older        Passed - No active pregnancy on record        Passed - No positive pregnancy test in last 12 months        Passed - Recent (6 mo) or future (30 days) visit within the authorizing provider's specialty     Patient had office visit in the last 6 months or has a visit in the next 30 days with authorizing provider or within the authorizing provider's specialty.  See \"Patient Info\" tab in inbasket, or \"Choose Columns\" in Meds & Orders section of the refill encounter.                Melissa Erazo RN, BSN  [facility]        "

## 2022-04-08 RX ORDER — METHOCARBAMOL 750 MG/1
750 TABLET, FILM COATED ORAL 3 TIMES DAILY PRN
Qty: 60 TABLET | Refills: 0 | Status: SHIPPED | OUTPATIENT
Start: 2022-04-08 | End: 2023-01-03

## 2022-04-08 NOTE — TELEPHONE ENCOUNTER
Spoke with patient and informed her that her medication was refilled. I also advised that she will need to schedule a follow up with provider. Patient insisted that she will schedule appointment at a later time.

## 2022-04-08 NOTE — TELEPHONE ENCOUNTER
Routing refill request to provider for review/approval because:  Drug not on the [facility] refill protocol.    Requested Prescriptions   Pending Prescriptions Disp Refills    methocarbamol (ROBAXIN) 750 MG tablet 60 tablet 1     Sig: Take 1 tablet (750 mg) by mouth 3 times daily as needed for muscle spasms        There is no refill protocol information for this order         Melissa Erazo RN, BSN  [facility]

## 2022-05-02 ENCOUNTER — TELEPHONE (OUTPATIENT)
Dept: FAMILY MEDICINE | Facility: CLINIC | Age: 37
End: 2022-05-02
Payer: COMMERCIAL

## 2022-05-02 NOTE — TELEPHONE ENCOUNTER
No refills. Needs video or phone visit for refills. Please help her schedule. She is also due to talk about other medications also.  Thank you,  MIKE Chatterjee, NP-C  [facility]

## 2022-05-02 NOTE — TELEPHONE ENCOUNTER
Called patient and scheduled for telephone visit tomorrow  For refill. Patient states she does not understand why it keeps getting taken off her med list, if it is due to get antibiotic or? States her pulmonologist was in charge of her refills before but is now sent to [facility].

## 2022-05-03 ENCOUNTER — VIRTUAL VISIT (OUTPATIENT)
Dept: FAMILY MEDICINE | Facility: CLINIC | Age: 37
End: 2022-05-03
Payer: COMMERCIAL

## 2022-05-03 ENCOUNTER — TELEPHONE (OUTPATIENT)
Dept: FAMILY MEDICINE | Facility: CLINIC | Age: 37
End: 2022-05-03
Payer: COMMERCIAL

## 2022-05-03 DIAGNOSIS — J47.9 ADULT BRONCHIECTASIS (H): ICD-10-CM

## 2022-05-03 DIAGNOSIS — J45.40 MODERATE PERSISTENT ASTHMA WITHOUT COMPLICATION: ICD-10-CM

## 2022-05-03 DIAGNOSIS — E78.1 HYPERTRIGLYCERIDEMIA: ICD-10-CM

## 2022-05-03 DIAGNOSIS — Z30.011 ENCOUNTER FOR INITIAL PRESCRIPTION OF CONTRACEPTIVE PILLS: ICD-10-CM

## 2022-05-03 DIAGNOSIS — B37.31 YEAST INFECTION OF THE VAGINA: ICD-10-CM

## 2022-05-03 DIAGNOSIS — F41.9 ANXIETY: ICD-10-CM

## 2022-05-03 DIAGNOSIS — J30.81 ALLERGIC RHINITIS DUE TO ANIMALS: Primary | ICD-10-CM

## 2022-05-03 DIAGNOSIS — F32.1 MODERATE SINGLE CURRENT EPISODE OF MAJOR DEPRESSIVE DISORDER (H): ICD-10-CM

## 2022-05-03 PROCEDURE — 99214 OFFICE O/P EST MOD 30 MIN: CPT | Mod: 95 | Performed by: NURSE PRACTITIONER

## 2022-05-03 RX ORDER — MONTELUKAST SODIUM 10 MG/1
TABLET ORAL
Qty: 30 TABLET | Refills: 4 | Status: SHIPPED | OUTPATIENT
Start: 2022-05-03 | End: 2022-09-09

## 2022-05-03 RX ORDER — FLUCONAZOLE 150 MG/1
150 TABLET ORAL
Qty: 1 TABLET | Refills: 5 | Status: SHIPPED | OUTPATIENT
Start: 2022-05-03 | End: 2022-09-07

## 2022-05-03 RX ORDER — FENOFIBRATE 54 MG/1
54 TABLET ORAL DAILY
Qty: 90 TABLET | Refills: 1 | Status: SHIPPED | OUTPATIENT
Start: 2022-05-03 | End: 2022-11-09

## 2022-05-03 RX ORDER — BUPROPION HYDROCHLORIDE 300 MG/1
300 TABLET ORAL EVERY MORNING
Qty: 90 TABLET | Refills: 1 | Status: SHIPPED | OUTPATIENT
Start: 2022-05-03 | End: 2023-03-28

## 2022-05-03 RX ORDER — DROSPIRENONE AND ETHINYL ESTRADIOL 0.02-3(28)
1 KIT ORAL DAILY
Qty: 84 TABLET | Refills: 3 | Status: SHIPPED | OUTPATIENT
Start: 2022-05-03 | End: 2022-06-29

## 2022-05-03 ASSESSMENT — PATIENT HEALTH QUESTIONNAIRE - PHQ9: SUM OF ALL RESPONSES TO PHQ QUESTIONS 1-9: 1

## 2022-05-03 NOTE — PROGRESS NOTES
"Michelle is a 37 year old who is being evaluated via a billable telephone visit.        What phone number would you like to be contacted at? [phone]  How would you like to obtain your AVS? MyChart      Assessment & Plan     Allergic rhinitis due to animals  Stable, refill sent.   - montelukast (SINGULAIR) 10 MG tablet; TAKE ONE TABLET BY MOUTH EVERY EVENING    Moderate persistent asthma without complication/Adult bronchiectasis (H)  Stable. Refill sent. Follows with pulmonology also, doesn't need to see them again until December 2022  - montelukast (SINGULAIR) 10 MG tablet; TAKE ONE TABLET BY MOUTH EVERY EVENING    Encounter for initial prescription of contraceptive pills  Working well, refill sent. Will come in person in August for pap/physical  - drospirenone-ethinyl estradiol (QUAN) 3-0.02 MG tablet; Take 1 tablet by mouth daily    Anxiety  Stable, refilled  - buPROPion (WELLBUTRIN XL) 300 MG 24 hr tablet; Take 1 tablet (300 mg) by mouth every morning    Moderate single current episode of major depressive disorder (H)  Stable, refilled  - buPROPion (WELLBUTRIN XL) 300 MG 24 hr tablet; Take 1 tablet (300 mg) by mouth every morning    Hypertriglyceridemia  Due for labs in August, will return for physical at that time  - fenofibrate (LOFIBRA) 54 MG tablet; Take 1 tablet (54 mg) by mouth daily    Yeast infection of the vagina  Gets frequent yeast infections due to chronic use of oral antibiotics due to her bronchiectisis, does follow with pulmonology for that. Will send oral fluconazole plus refills.  just call for refills in the future  - fluconazole (DIFLUCAN) 150 MG tablet; Take 1 tablet (150 mg) by mouth once as needed (yeast infection related to chronic oral antibiotic use)      BMI:   Estimated body mass index is 29.95 kg/m  as calculated from the following:    Height as of 12/6/21: 1.651 m (5' 5\").    Weight as of 12/6/21: 81.6 kg (180 lb).     Return in about 3 months (around 8/3/2022) for Lab Work, Annual " Exam.    MIKE Chatterjee, NP-C  [facility] WILLIAM Martinez is a 37 year old who presents for the following health issues  accompanied by her self.    HPI     Medication Followup of refills     Taking Medication as prescribed: yes    Side Effects:  None    Medication Helping Symptoms:  yes     Depression Followup    How are you doing with your depression since your last visit? No change    Are you having other symptoms that might be associated with depression? No    Have you had a significant life event?  No     Are you feeling anxious or having panic attacks?   No    Do you have any concerns with your use of alcohol or other drugs? No    Social History     Tobacco Use     Smoking status: Never Smoker     Smokeless tobacco: Never Used     Tobacco comment: lives in nonsmoking household    Substance Use Topics     Alcohol use: Not Currently     Alcohol/week: 0.0 standard drinks     Comment: rare, not in PG     Drug use: No     PHQ 10/6/2021 10/6/2021 5/3/2022   PHQ-9 Total Score 3 3 1   Q9: Thoughts of better off dead/self-harm past 2 weeks Not at all Not at all Not at all     FELIPA-7 SCORE 2/20/2020 10/6/2021 10/6/2021   Total Score 3 (minimal anxiety) - 4 (minimal anxiety)   Total Score 3 4 4     Last PHQ-9 5/3/2022   1.  Little interest or pleasure in doing things 0   2.  Feeling down, depressed, or hopeless 0   3.  Trouble falling or staying asleep, or sleeping too much 0   4.  Feeling tired or having little energy 1   5.  Poor appetite or overeating 0   6.  Feeling bad about yourself 0   7.  Trouble concentrating 0   8.  Moving slowly or restless 0   Q9: Thoughts of better off dead/self-harm past 2 weeks 0   PHQ-9 Total Score 1   Difficulty at work, home, or with people Not difficult at all     FELIPA-7  10/6/2021   1. Feeling nervous, anxious, or on edge 1   2. Not being able to stop or control worrying 1   3. Worrying too much about different things 1   4. Trouble relaxing 0   5.  Being so restless that it is hard to sit still 0   6. Becoming easily annoyed or irritable 0   7. Feeling afraid, as if something awful might happen 1   FELIPA-7 Total Score 4   If you checked any problems, how difficult have they made it for you to do your work, take care of things at home, or get along with other people? -       Suicide Assessment Five-step Evaluation and Treatment (SAFE-T)      How many servings of fruits and vegetables do you eat daily?  2-3    On average, how many sweetened beverages do you drink each day (Examples: soda, juice, sweet tea, etc.  Do NOT count diet or artificially sweetened beverages)?   1    How many days per week do you exercise enough to make your heart beat faster? 3 or less    How many minutes a day do you exercise enough to make your heart beat faster? 30 - 60    How many days per week do you miss taking your medication? 0      Review of Systems   Constitutional, HEENT, cardiovascular, pulmonary, gi and gu systems are negative, except as otherwise noted.      Objective           Vitals:  No vitals were obtained today due to virtual visit.    Physical Exam   healthy, alert and no distress  PSYCH: Alert and oriented times 3; coherent speech, normal   rate and volume, able to articulate logical thoughts, able   to abstract reason, no tangential thoughts, no hallucinations   or delusions  Her affect is normal  RESP: No cough, no audible wheezing, able to talk in full sentences  Remainder of exam unable to be completed due to telephone visits    Reviewed labs from August 2021            Phone call duration: 11 minutes

## 2022-05-12 ENCOUNTER — E-VISIT (OUTPATIENT)
Dept: URGENT CARE | Facility: CLINIC | Age: 37
End: 2022-05-12
Payer: COMMERCIAL

## 2022-05-12 DIAGNOSIS — R21 RASH AND NONSPECIFIC SKIN ERUPTION: Primary | ICD-10-CM

## 2022-05-12 PROCEDURE — 99207 PR NON-BILLABLE SERV PER CHARTING: CPT | Performed by: FAMILY MEDICINE

## 2022-05-12 NOTE — PATIENT INSTRUCTIONS
Dear Michelle Epstein,    We are sorry you are not feeling well. Based on the responses you provided, it is recommended that you be seen in-person in urgent care so we can better evaluate your symptoms. Please click here to find the nearest urgent care location to you.   You will not be charged for this Visit. Thank you for trusting us with your care.    Susy Chris MD

## 2022-05-27 NOTE — PROGRESS NOTES
Michelle is here for her left ear symptoms. Sinus issues are stable. Left ear is uncomfortable and hearing is maybe decreased. Dr. Luu saw her and recommended she be seen due to abnormal exam. This has happened in the past as well.    Exam: R ear normal, left ear with thick wax on TM, tried to clean with otoscope and suction and was unable therefore a microscope was used.     Procedure: left microscopy: the left TM was visualized and a thick layer of wax was removed from the surface of the TM. The TM itself was mildly inflamed, but landmarks were normal    Cleaned left ear under microscope - thick wax on TM    
denies loose teeth or dentures

## 2022-06-13 ENCOUNTER — E-VISIT (OUTPATIENT)
Dept: FAMILY MEDICINE | Facility: CLINIC | Age: 37
End: 2022-06-13
Payer: COMMERCIAL

## 2022-06-13 DIAGNOSIS — H66.91 RIGHT OTITIS MEDIA, UNSPECIFIED OTITIS MEDIA TYPE: ICD-10-CM

## 2022-06-13 PROCEDURE — 99207 PR NO CHARGE LOS: CPT | Performed by: FAMILY MEDICINE

## 2022-06-13 RX ORDER — NEOMYCIN SULFATE, POLYMYXIN B SULFATE AND HYDROCORTISONE 10; 3.5; 1 MG/ML; MG/ML; [USP'U]/ML
3 SUSPENSION/ DROPS AURICULAR (OTIC) 3 TIMES DAILY
Qty: 3 ML | Refills: 0 | Status: SHIPPED | OUTPATIENT
Start: 2022-06-13 | End: 2022-07-05

## 2022-06-29 DIAGNOSIS — Z30.011 ENCOUNTER FOR INITIAL PRESCRIPTION OF CONTRACEPTIVE PILLS: ICD-10-CM

## 2022-06-29 RX ORDER — DROSPIRENONE AND ETHINYL ESTRADIOL 0.02-3(28)
1 KIT ORAL DAILY
Qty: 84 TABLET | Refills: 3 | Status: SHIPPED | OUTPATIENT
Start: 2022-06-29 | End: 2023-07-12

## 2022-07-05 ENCOUNTER — E-VISIT (OUTPATIENT)
Dept: FAMILY MEDICINE | Facility: CLINIC | Age: 37
End: 2022-07-05
Payer: COMMERCIAL

## 2022-07-05 DIAGNOSIS — B96.89 ACUTE BACTERIAL SINUSITIS: ICD-10-CM

## 2022-07-05 DIAGNOSIS — J01.90 ACUTE BACTERIAL SINUSITIS: ICD-10-CM

## 2022-07-05 PROCEDURE — 99207 PR NO CHARGE LOS: CPT | Performed by: NURSE PRACTITIONER

## 2022-07-05 RX ORDER — LEVOFLOXACIN 750 MG/1
750 TABLET, FILM COATED ORAL DAILY
Qty: 10 TABLET | Refills: 0 | Status: SHIPPED | OUTPATIENT
Start: 2022-07-05 | End: 2022-10-31

## 2022-07-05 RX ORDER — PREDNISONE 20 MG/1
TABLET ORAL
Qty: 20 TABLET | Refills: 0 | Status: SHIPPED | OUTPATIENT
Start: 2022-07-05 | End: 2022-09-09

## 2022-07-05 NOTE — PATIENT INSTRUCTIONS
Sinusitis (Antibiotic Treatment)    The sinuses are air-filled spaces within the bones of the face. They connect to the inside of the nose. Sinusitis is an inflammation of the tissue that lines the sinuses. Sinusitis can occur during a cold. It can also happen due to allergies to pollens and other particles in the air. Sinusitis can cause symptoms of sinus congestion and a feeling of fullness. A sinus infection causes fever, headache, and facial pain. There is often green or yellow fluid draining from the nose or into the back of the throat (post-nasal drip). You have been given antibiotics to treat this condition.   Home care    Take the full course of antibiotics as instructed. Don't stop taking them, even when you feel better.    Drink plenty of water, hot tea, and other liquids as directed by the healthcare provider. This may help thin nasal mucus. It also may help your sinuses drain fluids.    Heat may help soothe painful areas of your face. Use a towel soaked in hot water. Or,  the shower and direct the warm spray onto your face. Using a vaporizer along with a menthol rub at night may also help soothe symptoms.     An expectorant with guaifenesin may help thin nasal mucus and help your sinuses drain fluids. Talk with your provider or pharmacists before taking an over-the-counter (OTC) medicine if you have any questions about it or its side effects..    You can use an OTC decongestant, unless a similar medicine was prescribed to you. Nasal sprays work the fastest. Use one that contains phenylephrine or oxymetazoline. First blow your nose gently. Then use the spray. Don't use these medicines more often than directed on the label. If you do, your symptoms may get worse. You may also take pills that contain pseudoephedrine. Don t use products that combine multiple medicines. This is because side effects may be increased. Read labels. You can also ask the pharmacist for help. (People with high blood  pressure should not use decongestants. They can raise blood pressure.) Talk with your provider or pharmacist if you have any questions about the medicine..    OTC antihistamines may help if allergies contributed to your sinusitis. Talk with your provider or pharmacist if you have any questions about the medicine..    Don't use nasal rinses or irrigation during an acute sinus infection, unless your healthcare provider tells you to. Rinsing may spread the infection to other areas in your sinuses.    Use acetaminophen or ibuprofen to control pain, unless another pain medicine was prescribed to you. If you have chronic liver or kidney disease or ever had a stomach ulcer, talk with your healthcare provider before using these medicines. Never give aspirin to anyone under age 18 who is ill with a fever. It may cause severe liver damage.    Don't smoke. This can make symptoms worse.    Follow-up care  Follow up with your healthcare provider, or as advised.   When to seek medical advice  Call your healthcare provider if any of these occur:     Facial pain or headache that gets worse    Stiff neck    Unusual drowsiness or confusion    Swelling of your forehead or eyelids    Symptoms don't go away in 10 days    Vision problems, such as blurred or double vision    Fever of 100.4 F (38 C) or higher, or as directed by your healthcare provider  Call 911  Call 911 if any of these occur:     Seizure    Trouble breathing    Feeling dizzy or faint    Fingernails, skin or lips look blue, purple , or gray  Prevention  Here are steps you can take to help prevent an infection:     Keep good hand washing habits.    Don t have close contact with people who have sore throats, colds, or other upper respiratory infections.    Don t smoke, and stay away from secondhand smoke.    Stay up to date with of your vaccines.  Canvas last reviewed this educational content on 12/1/2019 2000-2021 [employer]. All rights reserved. This  information is not intended as a substitute for professional medical care. Always follow your healthcare professional's instructions.        Dear Michelle Epstein    After reviewing your responses, I've been able to diagnose you with?a sinus infection caused by bacteria.?     Based on your responses and diagnosis, I have prescribed Levaquin to treat your symptoms. I have sent this to your pharmacy.?     It is also important to stay well hydrated, get lots of rest and take over-the-counter decongestants,?tylenol?or ibuprofen if you?are able to?take those medications per your primary care provider to help relieve discomfort.?     It is important that you take?all of?your prescribed medication even if your symptoms are improving after a few doses.? Taking?all of?your medicine helps prevent the symptoms from returning.?     If your symptoms worsen, you develop severe headache, vomiting, high fever (>102), or are not improving in 7 days, please contact your primary care provider for an appointment or visit any of our convenient Walk-in Care or Urgent Care Centers to be seen which can be found on our website?here.?     Thanks again for choosing?us?as your health care partner,?   ?  Randa Higgins MD?

## 2022-07-25 DIAGNOSIS — J47.9 BRONCHIECTASIS WITHOUT COMPLICATION (H): ICD-10-CM

## 2022-07-26 RX ORDER — AZITHROMYCIN 250 MG/1
TABLET, FILM COATED ORAL
Qty: 30 TABLET | Refills: 11 | Status: SHIPPED | OUTPATIENT
Start: 2022-07-26 | End: 2023-08-10

## 2022-09-07 DIAGNOSIS — B37.31 YEAST INFECTION OF THE VAGINA: ICD-10-CM

## 2022-09-07 DIAGNOSIS — F32.1 MODERATE SINGLE CURRENT EPISODE OF MAJOR DEPRESSIVE DISORDER (H): ICD-10-CM

## 2022-09-07 RX ORDER — FLUCONAZOLE 150 MG/1
150 TABLET ORAL
Qty: 1 TABLET | Refills: 5 | Status: SHIPPED | OUTPATIENT
Start: 2022-09-07 | End: 2022-11-14

## 2022-09-09 ENCOUNTER — E-VISIT (OUTPATIENT)
Dept: FAMILY MEDICINE | Facility: CLINIC | Age: 37
End: 2022-09-09
Payer: COMMERCIAL

## 2022-09-09 DIAGNOSIS — B96.89 ACUTE BACTERIAL SINUSITIS: ICD-10-CM

## 2022-09-09 DIAGNOSIS — J01.90 ACUTE BACTERIAL SINUSITIS: ICD-10-CM

## 2022-09-09 PROCEDURE — 99421 OL DIG E/M SVC 5-10 MIN: CPT | Performed by: NURSE PRACTITIONER

## 2022-09-09 RX ORDER — PREDNISONE 20 MG/1
TABLET ORAL
Qty: 20 TABLET | Refills: 0 | Status: SHIPPED | OUTPATIENT
Start: 2022-09-09 | End: 2022-10-31

## 2022-09-09 RX ORDER — LEVOFLOXACIN 750 MG/1
750 TABLET, FILM COATED ORAL DAILY
Qty: 7 TABLET | Refills: 0 | Status: SHIPPED | OUTPATIENT
Start: 2022-09-09 | End: 2022-09-16

## 2022-09-09 NOTE — PATIENT INSTRUCTIONS
Sinusitis (Antibiotic Treatment)    The sinuses are air-filled spaces within the bones of the face. They connect to the inside of the nose. Sinusitis is an inflammation of the tissue that lines the sinuses. Sinusitis can occur during a cold. It can also happen due to allergies to pollens and other particles in the air. Sinusitis can cause symptoms of sinus congestion and a feeling of fullness. A sinus infection causes fever, headache, and facial pain. There is often green or yellow fluid draining from the nose or into the back of the throat (post-nasal drip). You have been given antibiotics to treat this condition.   Home care  Take the full course of antibiotics as instructed. Don't stop taking them, even when you feel better.  Drink plenty of water, hot tea, and other liquids as directed by the healthcare provider. This may help thin nasal mucus. It also may help your sinuses drain fluids.  Heat may help soothe painful areas of your face. Use a towel soaked in hot water. Or,  the shower and direct the warm spray onto your face. Using a vaporizer along with a menthol rub at night may also help soothe symptoms.   An expectorant with guaifenesin may help thin nasal mucus and help your sinuses drain fluids. Talk with your provider or pharmacists before taking an over-the-counter (OTC) medicine if you have any questions about it or its side effects..  You can use an OTC decongestant, unless a similar medicine was prescribed to you. Nasal sprays work the fastest. Use one that contains phenylephrine or oxymetazoline. First blow your nose gently. Then use the spray. Don't use these medicines more often than directed on the label. If you do, your symptoms may get worse. You may also take pills that contain pseudoephedrine. Don t use products that combine multiple medicines. This is because side effects may be increased. Read labels. You can also ask the pharmacist for help. (People with high blood pressure  should not use decongestants. They can raise blood pressure.) Talk with your provider or pharmacist if you have any questions about the medicine..  OTC antihistamines may help if allergies contributed to your sinusitis. Talk with your provider or pharmacist if you have any questions about the medicine..  Don't use nasal rinses or irrigation during an acute sinus infection, unless your healthcare provider tells you to. Rinsing may spread the infection to other areas in your sinuses.  Use acetaminophen or ibuprofen to control pain, unless another pain medicine was prescribed to you. If you have chronic liver or kidney disease or ever had a stomach ulcer, talk with your healthcare provider before using these medicines. Never give aspirin to anyone under age 18 who is ill with a fever. It may cause severe liver damage.  Don't smoke. This can make symptoms worse.    Follow-up care  Follow up with your healthcare provider, or as advised.   When to seek medical advice  Call your healthcare provider if any of these occur:   Facial pain or headache that gets worse  Stiff neck  Unusual drowsiness or confusion  Swelling of your forehead or eyelids  Symptoms don't go away in 10 days  Vision problems, such as blurred or double vision  Fever of 100.4 F (38 C) or higher, or as directed by your healthcare provider  Call 911  Call 911 if any of these occur:   Seizure  Trouble breathing  Feeling dizzy or faint  Fingernails, skin or lips look blue, purple , or gray  Prevention  Here are steps you can take to help prevent an infection:   Keep good hand washing habits.  Don t have close contact with people who have sore throats, colds, or other upper respiratory infections.  Don t smoke, and stay away from secondhand smoke.  Stay up to date with of your vaccines.  [employer] last reviewed this educational content on 12/1/2019 2000-2021 [employer]. All rights reserved. This information is not intended as a substitute for  professional medical care. Always follow your healthcare professional's instructions.        Dear Michelle Epstein    After reviewing your responses, I've been able to diagnose you with?a sinus infection caused by bacteria.?     Based on your responses and diagnosis, I have prescribed levoquin and prednisone to treat your symptoms. I have sent this to your pharmacy.?     It is also important to stay well hydrated, get lots of rest and take over-the-counter decongestants,?tylenol?or ibuprofen if you?are able to?take those medications per your primary care provider to help relieve discomfort.?     It is important that you take?all of?your prescribed medication even if your symptoms are improving after a few doses.? Taking?all of?your medicine helps prevent the symptoms from returning.?     If your symptoms worsen, you develop severe headache, vomiting, high fever (>102), or are not improving in 7 days, please contact your primary care provider for an appointment or visit any of our convenient Walk-in Care or Urgent Care Centers to be seen which can be found on our website?here.?     Thanks again for choosing?us?as your health care partner,?   ?  Mindy Fink NP?

## 2022-09-16 RX ORDER — HYDROXYZINE HYDROCHLORIDE 25 MG/1
25-50 TABLET, FILM COATED ORAL EVERY 6 HOURS PRN
Qty: 30 TABLET | Refills: 0 | Status: SHIPPED | OUTPATIENT
Start: 2022-09-16 | End: 2023-01-03

## 2022-10-10 DIAGNOSIS — J47.9 BRONCHIECTASIS WITHOUT ACUTE EXACERBATION (H): ICD-10-CM

## 2022-10-10 RX ORDER — ALBUTEROL SULFATE 90 UG/1
AEROSOL, METERED RESPIRATORY (INHALATION)
Qty: 8.5 G | Refills: 11 | Status: SHIPPED | OUTPATIENT
Start: 2022-10-10 | End: 2023-11-10

## 2022-10-14 ENCOUNTER — TELEPHONE (OUTPATIENT)
Dept: CARDIOLOGY | Facility: CLINIC | Age: 37
End: 2022-10-14

## 2022-10-17 ENCOUNTER — TELEPHONE (OUTPATIENT)
Dept: CARDIOLOGY | Facility: CLINIC | Age: 37
End: 2022-10-17

## 2022-10-30 ENCOUNTER — E-VISIT (OUTPATIENT)
Dept: FAMILY MEDICINE | Facility: CLINIC | Age: 37
End: 2022-10-30
Payer: COMMERCIAL

## 2022-10-30 DIAGNOSIS — J01.90 ACUTE BACTERIAL SINUSITIS: ICD-10-CM

## 2022-10-30 DIAGNOSIS — B96.89 ACUTE BACTERIAL SINUSITIS: ICD-10-CM

## 2022-10-30 PROCEDURE — 99421 OL DIG E/M SVC 5-10 MIN: CPT | Performed by: NURSE PRACTITIONER

## 2022-10-31 RX ORDER — PREDNISONE 20 MG/1
TABLET ORAL
Qty: 20 TABLET | Refills: 0 | Status: SHIPPED | OUTPATIENT
Start: 2022-10-31 | End: 2023-02-06

## 2022-10-31 RX ORDER — LEVOFLOXACIN 750 MG/1
750 TABLET, FILM COATED ORAL DAILY
Qty: 14 TABLET | Refills: 0 | Status: SHIPPED | OUTPATIENT
Start: 2022-10-31 | End: 2022-11-14

## 2022-10-31 NOTE — PATIENT INSTRUCTIONS
Sinusitis (Antibiotic Treatment)    The sinuses are air-filled spaces within the bones of the face. They connect to the inside of the nose. Sinusitis is an inflammation of the tissue that lines the sinuses. Sinusitis can occur during a cold. It can also happen due to allergies to pollens and other particles in the air. Sinusitis can cause symptoms of sinus congestion and a feeling of fullness. A sinus infection causes fever, headache, and facial pain. There is often green or yellow fluid draining from the nose or into the back of the throat (post-nasal drip). You have been given antibiotics to treat this condition.   Home care    Take the full course of antibiotics as instructed. Don't stop taking them, even when you feel better.    Drink plenty of water, hot tea, and other liquids as directed by the healthcare provider. This may help thin nasal mucus. It also may help your sinuses drain fluids.    Heat may help soothe painful areas of your face. Use a towel soaked in hot water. Or,  the shower and direct the warm spray onto your face. Using a vaporizer along with a menthol rub at night may also help soothe symptoms.     An expectorant with guaifenesin may help thin nasal mucus and help your sinuses drain fluids. Talk with your provider or pharmacists before taking an over-the-counter (OTC) medicine if you have any questions about it or its side effects..    You can use an OTC decongestant, unless a similar medicine was prescribed to you. Nasal sprays work the fastest. Use one that contains phenylephrine or oxymetazoline. First blow your nose gently. Then use the spray. Don't use these medicines more often than directed on the label. If you do, your symptoms may get worse. You may also take pills that contain pseudoephedrine. Don t use products that combine multiple medicines. This is because side effects may be increased. Read labels. You can also ask the pharmacist for help. (People with high blood  pressure should not use decongestants. They can raise blood pressure.) Talk with your provider or pharmacist if you have any questions about the medicine..    OTC antihistamines may help if allergies contributed to your sinusitis. Talk with your provider or pharmacist if you have any questions about the medicine..    Don't use nasal rinses or irrigation during an acute sinus infection, unless your healthcare provider tells you to. Rinsing may spread the infection to other areas in your sinuses.    Use acetaminophen or ibuprofen to control pain, unless another pain medicine was prescribed to you. If you have chronic liver or kidney disease or ever had a stomach ulcer, talk with your healthcare provider before using these medicines. Never give aspirin to anyone under age 18 who is ill with a fever. It may cause severe liver damage.    Don't smoke. This can make symptoms worse.    Follow-up care  Follow up with your healthcare provider, or as advised.   When to seek medical advice  Call your healthcare provider if any of these occur:     Facial pain or headache that gets worse    Stiff neck    Unusual drowsiness or confusion    Swelling of your forehead or eyelids    Symptoms don't go away in 10 days    Vision problems, such as blurred or double vision    Fever of 100.4 F (38 C) or higher, or as directed by your healthcare provider  Call 911  Call 911 if any of these occur:     Seizure    Trouble breathing    Feeling dizzy or faint    Fingernails, skin or lips look blue, purple , or gray  Prevention  Here are steps you can take to help prevent an infection:     Keep good hand washing habits.    Don t have close contact with people who have sore throats, colds, or other upper respiratory infections.    Don t smoke, and stay away from secondhand smoke.    Stay up to date with of your vaccines.  [employer] last reviewed this educational content on 12/1/2019 2000-2021 [employer]. All rights reserved. This  information is not intended as a substitute for professional medical care. Always follow your healthcare professional's instructions.        Dear Michelle Epstein    After reviewing your responses, I've been able to diagnose you with?a sinus infection caused by bacteria.?     Based on your responses and diagnosis, I have prescribed levoquin for 14 days and prednisone taper to treat your symptoms. I have sent this to your pharmacy.?     It is also important to stay well hydrated, get lots of rest and take over-the-counter decongestants,?tylenol?or ibuprofen if you?are able to?take those medications per your primary care provider to help relieve discomfort.?     It is important that you take?all of?your prescribed medication even if your symptoms are improving after a few doses.? Taking?all of?your medicine helps prevent the symptoms from returning.?     If your symptoms worsen, you develop severe headache, vomiting, high fever (>102), or are not improving in 7 days, please contact your primary care provider for an appointment or visit any of our convenient Walk-in Care or Urgent Care Centers to be seen which can be found on our website?here.?     Thanks again for choosing?us?as your health care partner,?   ?  Mindy Fink NP?

## 2022-11-09 DIAGNOSIS — E78.1 HYPERTRIGLYCERIDEMIA: ICD-10-CM

## 2022-11-09 RX ORDER — FENOFIBRATE 54 MG/1
54 TABLET ORAL DAILY
Qty: 90 TABLET | Refills: 1 | Status: SHIPPED | OUTPATIENT
Start: 2022-11-09 | End: 2023-05-17

## 2022-11-14 DIAGNOSIS — B37.31 YEAST INFECTION OF THE VAGINA: ICD-10-CM

## 2022-11-14 RX ORDER — FLUCONAZOLE 150 MG/1
150 TABLET ORAL
Qty: 1 TABLET | Refills: 5 | Status: SHIPPED | OUTPATIENT
Start: 2022-11-14 | End: 2023-07-12

## 2022-12-01 DIAGNOSIS — J45.50 SEVERE PERSISTENT ASTHMA (H): ICD-10-CM

## 2022-12-05 RX ORDER — DUPILUMAB 300 MG/2ML
300 INJECTION, SOLUTION SUBCUTANEOUS
Qty: 2 ML | Refills: 26 | Status: SHIPPED | OUTPATIENT
Start: 2022-12-05 | End: 2023-02-06

## 2022-12-06 ENCOUNTER — TELEPHONE (OUTPATIENT)
Dept: PULMONOLOGY | Facility: CLINIC | Age: 37
End: 2022-12-06

## 2022-12-22 ENCOUNTER — TELEPHONE (OUTPATIENT)
Dept: PULMONOLOGY | Facility: CLINIC | Age: 37
End: 2022-12-22

## 2022-12-22 DIAGNOSIS — J47.9 BRONCHIECTASIS (H): Primary | ICD-10-CM

## 2022-12-22 NOTE — TELEPHONE ENCOUNTER
Patient Contacted for the patient to call back and schedule the following:    Appointment type: RTN  Provider: Jus Lemos  Return date: 2/6/23  Specialty phone number: [phone]  Additional appointment(s) needed: PFT  Additonal Notes: NA

## 2023-01-02 DIAGNOSIS — F32.1 MODERATE SINGLE CURRENT EPISODE OF MAJOR DEPRESSIVE DISORDER (H): ICD-10-CM

## 2023-01-02 DIAGNOSIS — M62.830 BACK MUSCLE SPASM: ICD-10-CM

## 2023-01-03 RX ORDER — METHOCARBAMOL 750 MG/1
750 TABLET, FILM COATED ORAL 3 TIMES DAILY PRN
Qty: 60 TABLET | Refills: 1 | Status: SHIPPED | OUTPATIENT
Start: 2023-01-03 | End: 2023-03-28

## 2023-01-03 RX ORDER — HYDROXYZINE HYDROCHLORIDE 25 MG/1
25-50 TABLET, FILM COATED ORAL EVERY 6 HOURS PRN
Qty: 30 TABLET | Refills: 0 | Status: SHIPPED | OUTPATIENT
Start: 2023-01-03 | End: 2023-05-01

## 2023-01-08 ENCOUNTER — E-VISIT (OUTPATIENT)
Dept: FAMILY MEDICINE | Facility: CLINIC | Age: 38
End: 2023-01-08
Payer: COMMERCIAL

## 2023-01-08 DIAGNOSIS — B37.31 YEAST INFECTION OF THE VAGINA: ICD-10-CM

## 2023-01-08 DIAGNOSIS — J01.90 ACUTE BACTERIAL SINUSITIS: Primary | ICD-10-CM

## 2023-01-08 DIAGNOSIS — B96.89 ACUTE BACTERIAL SINUSITIS: Primary | ICD-10-CM

## 2023-01-08 PROCEDURE — 99421 OL DIG E/M SVC 5-10 MIN: CPT | Performed by: PHYSICIAN ASSISTANT

## 2023-01-09 RX ORDER — LEVOFLOXACIN 750 MG/1
750 TABLET, FILM COATED ORAL DAILY
Qty: 7 TABLET | Refills: 0 | Status: SHIPPED | OUTPATIENT
Start: 2023-01-09 | End: 2023-02-06

## 2023-01-09 RX ORDER — PREDNISONE 20 MG/1
TABLET ORAL
Qty: 20 TABLET | Refills: 0 | Status: SHIPPED | OUTPATIENT
Start: 2023-01-09 | End: 2023-02-06

## 2023-01-09 RX ORDER — FLUCONAZOLE 150 MG/1
150 TABLET ORAL ONCE
Qty: 2 TABLET | Refills: 0 | Status: SHIPPED | OUTPATIENT
Start: 2023-01-09 | End: 2023-01-09

## 2023-01-09 NOTE — PATIENT INSTRUCTIONS
Sinusitis (Antibiotic Treatment)    The sinuses are air-filled spaces within the bones of the face. They connect to the inside of the nose. Sinusitis is an inflammation of the tissue that lines the sinuses. Sinusitis can occur during a cold. It can also happen due to allergies to pollens and other particles in the air. Sinusitis can cause symptoms of sinus congestion and a feeling of fullness. A sinus infection causes fever, headache, and facial pain. There is often green or yellow fluid draining from the nose or into the back of the throat (post-nasal drip). You have been given antibiotics to treat this condition.   Home care    Take the full course of antibiotics as instructed. Don't stop taking them, even when you feel better.    Drink plenty of water, hot tea, and other liquids as directed by the healthcare provider. This may help thin nasal mucus. It also may help your sinuses drain fluids.    Heat may help soothe painful areas of your face. Use a towel soaked in hot water. Or,  the shower and direct the warm spray onto your face. Using a vaporizer along with a menthol rub at night may also help soothe symptoms.     An expectorant with guaifenesin may help thin nasal mucus and help your sinuses drain fluids. Talk with your provider or pharmacists before taking an over-the-counter (OTC) medicine if you have any questions about it or its side effects..    You can use an OTC decongestant, unless a similar medicine was prescribed to you. Nasal sprays work the fastest. Use one that contains phenylephrine or oxymetazoline. First blow your nose gently. Then use the spray. Don't use these medicines more often than directed on the label. If you do, your symptoms may get worse. You may also take pills that contain pseudoephedrine. Don t use products that combine multiple medicines. This is because side effects may be increased. Read labels. You can also ask the pharmacist for help. (People with high blood  pressure should not use decongestants. They can raise blood pressure.) Talk with your provider or pharmacist if you have any questions about the medicine..    OTC antihistamines may help if allergies contributed to your sinusitis. Talk with your provider or pharmacist if you have any questions about the medicine..    Don't use nasal rinses or irrigation during an acute sinus infection, unless your healthcare provider tells you to. Rinsing may spread the infection to other areas in your sinuses.    Use acetaminophen or ibuprofen to control pain, unless another pain medicine was prescribed to you. If you have chronic liver or kidney disease or ever had a stomach ulcer, talk with your healthcare provider before using these medicines. Never give aspirin to anyone under age 18 who is ill with a fever. It may cause severe liver damage.    Don't smoke. This can make symptoms worse.    Follow-up care  Follow up with your healthcare provider, or as advised.   When to seek medical advice  Call your healthcare provider if any of these occur:     Facial pain or headache that gets worse    Stiff neck    Unusual drowsiness or confusion    Swelling of your forehead or eyelids    Symptoms don't go away in 10 days    Vision problems, such as blurred or double vision    Fever of 100.4 F (38 C) or higher, or as directed by your healthcare provider  Call 911  Call 911 if any of these occur:     Seizure    Trouble breathing    Feeling dizzy or faint    Fingernails, skin or lips look blue, purple , or gray  Prevention  Here are steps you can take to help prevent an infection:     Keep good hand washing habits.    Don t have close contact with people who have sore throats, colds, or other upper respiratory infections.    Don t smoke, and stay away from secondhand smoke.    Stay up to date with of your vaccines.  [employer] last reviewed this educational content on 12/1/2019 2000-2021 [employer]. All rights reserved. This  information is not intended as a substitute for professional medical care. Always follow your healthcare professional's instructions.        Dear Michelle Epstein    After reviewing your responses, I've been able to diagnose you with    Acute bacterial sinusitis  Yeast infection of the vagina.      Based on your responses and diagnosis, I have prescribed   Orders Placed This Encounter     predniSONE (DELTASONE) 20 MG tablet     levofloxacin (LEVAQUIN) 750 MG tablet     fluconazole (DIFLUCAN) 150 MG tablet    to treat your symptoms. I have sent this to your pharmacy.?     It is also important to stay well hydrated, get lots of rest and take over-the-counter decongestants,?tylenol?or ibuprofen if you?are able to?take those medications per your primary care provider to help relieve discomfort.?     It is important that you take?all of?your prescribed medication even if your symptoms are improving after a few doses.? Taking?all of?your medicine helps prevent the symptoms from returning.?     If your symptoms worsen, you develop severe headache, vomiting, high fever (>102), or are not improving in 7 days, please contact your primary care provider for an appointment or visit any of our convenient Walk-in Care or Urgent Care Centers to be seen which can be found on our website?here.?     Thanks again for choosing?us?as your health care partner,?   ?  Renita Heck PA-C?

## 2023-01-18 DIAGNOSIS — I47.10 SVT (SUPRAVENTRICULAR TACHYCARDIA) (H): ICD-10-CM

## 2023-01-23 RX ORDER — VERAPAMIL HYDROCHLORIDE 120 MG/1
120 CAPSULE, EXTENDED RELEASE ORAL DAILY
Qty: 90 CAPSULE | Refills: 0 | Status: SHIPPED | OUTPATIENT
Start: 2023-01-23 | End: 2023-02-07

## 2023-01-30 ENCOUNTER — TELEPHONE (OUTPATIENT)
Dept: CARDIOLOGY | Facility: CLINIC | Age: 38
End: 2023-01-30
Payer: COMMERCIAL

## 2023-01-30 NOTE — TELEPHONE ENCOUNTER
----- Message from Gwen Mills CMA sent at 1/30/2023 12:14 PM CST -----  Fani villar I sent a mychart unable to lvm can you help   ----- Message -----  From: Radha Croft  Sent: 1/23/2023  12:51 PM CST  To: Gwen Mills CMA      ----- Message -----  From: Lizzie Kendall RN  Sent: 1/23/2023  12:14 PM CST  To: Cardiology Ep Coordinator-     Please call pt to schedule appointment with FABIANO Denise  Last seen 01/2022  Kayla Samuel

## 2023-02-03 ENCOUNTER — MYC MEDICAL ADVICE (OUTPATIENT)
Dept: FAMILY MEDICINE | Facility: CLINIC | Age: 38
End: 2023-02-03
Payer: COMMERCIAL

## 2023-02-03 DIAGNOSIS — E66.3 OVERWEIGHT: Primary | ICD-10-CM

## 2023-02-03 NOTE — TELEPHONE ENCOUNTER
Please see [employer] message.    Routing to provider to review and advise, and if more appropriate to schedule visit for Rx request.    Mare Barry RN  [facility]

## 2023-02-06 ENCOUNTER — OFFICE VISIT (OUTPATIENT)
Dept: PULMONOLOGY | Facility: CLINIC | Age: 38
End: 2023-02-06
Payer: COMMERCIAL

## 2023-02-06 ENCOUNTER — OFFICE VISIT (OUTPATIENT)
Dept: TRANSPLANT | Facility: CLINIC | Age: 38
End: 2023-02-06
Attending: INTERNAL MEDICINE
Payer: COMMERCIAL

## 2023-02-06 VITALS
DIASTOLIC BLOOD PRESSURE: 69 MMHG | HEIGHT: 65 IN | BODY MASS INDEX: 32.32 KG/M2 | HEART RATE: 92 BPM | WEIGHT: 194 LBS | OXYGEN SATURATION: 99 % | SYSTOLIC BLOOD PRESSURE: 105 MMHG

## 2023-02-06 VITALS
HEIGHT: 65 IN | HEART RATE: 101 BPM | OXYGEN SATURATION: 99 % | RESPIRATION RATE: 17 BRPM | WEIGHT: 194 LBS | SYSTOLIC BLOOD PRESSURE: 105 MMHG | DIASTOLIC BLOOD PRESSURE: 69 MMHG | BODY MASS INDEX: 32.32 KG/M2

## 2023-02-06 DIAGNOSIS — J32.4 CHRONIC PANSINUSITIS: ICD-10-CM

## 2023-02-06 DIAGNOSIS — J45.50 SEVERE PERSISTENT ASTHMA (H): Primary | ICD-10-CM

## 2023-02-06 DIAGNOSIS — J45.50 SEVERE PERSISTENT ASTHMA (H): ICD-10-CM

## 2023-02-06 DIAGNOSIS — J82.83 EOSINOPHILIC ASTHMA: ICD-10-CM

## 2023-02-06 DIAGNOSIS — J47.9 ADULT BRONCHIECTASIS (H): Primary | ICD-10-CM

## 2023-02-06 DIAGNOSIS — J47.9 BRONCHIECTASIS (H): ICD-10-CM

## 2023-02-06 DIAGNOSIS — J45.50 SEVERE PERSISTENT ASTHMA, UNSPECIFIED WHETHER COMPLICATED (H): ICD-10-CM

## 2023-02-06 PROCEDURE — G0463 HOSPITAL OUTPT CLINIC VISIT: HCPCS

## 2023-02-06 PROCEDURE — 94375 RESPIRATORY FLOW VOLUME LOOP: CPT | Performed by: INTERNAL MEDICINE

## 2023-02-06 PROCEDURE — 99213 OFFICE O/P EST LOW 20 MIN: CPT | Mod: 25

## 2023-02-06 RX ORDER — MOMETASONE FUROATE AND FORMOTEROL FUMARATE DIHYDRATE 200; 5 UG/1; UG/1
2 AEROSOL RESPIRATORY (INHALATION) 2 TIMES DAILY
Qty: 13 G | Refills: 11 | Status: SHIPPED | OUTPATIENT
Start: 2023-02-06 | End: 2023-02-09

## 2023-02-06 RX ORDER — FLUTICASONE PROPIONATE 50 MCG
2 SPRAY, SUSPENSION (ML) NASAL DAILY PRN
Qty: 16 G | Refills: 6
Start: 2023-02-06 | End: 2023-08-18

## 2023-02-06 RX ORDER — DUPILUMAB 300 MG/2ML
300 INJECTION, SOLUTION SUBCUTANEOUS
Qty: 2 ML | Refills: 26 | Status: SHIPPED | OUTPATIENT
Start: 2023-02-06 | End: 2024-02-09

## 2023-02-06 RX ORDER — BUPROPION HYDROCHLORIDE 75 MG/1
TABLET ORAL
Qty: 30 TABLET | Refills: 0 | Status: SHIPPED | OUTPATIENT
Start: 2023-02-06 | End: 2023-03-07

## 2023-02-06 ASSESSMENT — ASTHMA QUESTIONNAIRES
ACT_TOTALSCORE: 21
QUESTION_2 LAST FOUR WEEKS HOW OFTEN HAVE YOU HAD SHORTNESS OF BREATH: ONCE OR TWICE A WEEK
QUESTION_4 LAST FOUR WEEKS HOW OFTEN HAVE YOU USED YOUR RESCUE INHALER OR NEBULIZER MEDICATION (SUCH AS ALBUTEROL): ONE OR TWO TIMES PER DAY
QUESTION_5 LAST FOUR WEEKS HOW WOULD YOU RATE YOUR ASTHMA CONTROL: COMPLETELY CONTROLLED
ACT_TOTALSCORE: 21
QUESTION_1 LAST FOUR WEEKS HOW MUCH OF THE TIME DID YOUR ASTHMA KEEP YOU FROM GETTING AS MUCH DONE AT WORK, SCHOOL OR AT HOME: NONE OF THE TIME
QUESTION_3 LAST FOUR WEEKS HOW OFTEN DID YOUR ASTHMA SYMPTOMS (WHEEZING, COUGHING, SHORTNESS OF BREATH, CHEST TIGHTNESS OR PAIN) WAKE YOU UP AT NIGHT OR EARLIER THAN USUAL IN THE MORNING: NOT AT ALL

## 2023-02-06 ASSESSMENT — PAIN SCALES - GENERAL
PAINLEVEL: NO PAIN (0)
PAINLEVEL: NO PAIN (0)

## 2023-02-06 NOTE — TELEPHONE ENCOUNTER
Prior Authorization Retail Medication Request    Medication/Dose: Dulera 200-5MCG  ICD code (if different than what is on RX):    Previously Tried and Failed:    Rationale:      Insurance Name:    Insurance ID:        Pharmacy Information (if different than what is on RX)  Name:    Phone:
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
## 2023-02-06 NOTE — PATIENT INSTRUCTIONS
Continue current medication, nebs and vest therapy.   Call our office if you are not feeling well.    [facility] Nurse line:  Ayesha  [phone]     [facility] Fax Number:     [phone]         Cyst[facility] Fibrosis Respiratory Therapists:   Linda Paige          [phone]           Yanci Valdez   [phone]
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
## 2023-02-06 NOTE — NURSING NOTE
"Chief Complaint   Patient presents with     RECHECK     Pre TX     /69   Pulse 92   Ht 1.651 m (5' 5\")   Wt 88 kg (194 lb)   SpO2 99%   BMI 32.28 kg/m        Mery Sue MA  "

## 2023-02-06 NOTE — NURSING NOTE
Chief Complaint   Patient presents with     RECHECK     Return visit    Medications reviewed and vital signs taken.   Stevo Winter CMA

## 2023-02-07 ENCOUNTER — OFFICE VISIT (OUTPATIENT)
Dept: CARDIOLOGY | Facility: CLINIC | Age: 38
End: 2023-02-07
Payer: COMMERCIAL

## 2023-02-07 ENCOUNTER — TELEPHONE (OUTPATIENT)
Facility: CLINIC | Age: 38
End: 2023-02-07

## 2023-02-07 VITALS — OXYGEN SATURATION: 97 % | DIASTOLIC BLOOD PRESSURE: 84 MMHG | SYSTOLIC BLOOD PRESSURE: 121 MMHG | HEART RATE: 95 BPM

## 2023-02-07 DIAGNOSIS — I47.10 SVT (SUPRAVENTRICULAR TACHYCARDIA) (H): ICD-10-CM

## 2023-02-07 PROCEDURE — 99214 OFFICE O/P EST MOD 30 MIN: CPT | Performed by: INTERNAL MEDICINE

## 2023-02-07 RX ORDER — VERAPAMIL HYDROCHLORIDE 120 MG/1
120 CAPSULE, EXTENDED RELEASE ORAL DAILY
Qty: 90 CAPSULE | Refills: 3 | Status: SHIPPED | OUTPATIENT
Start: 2023-02-07 | End: 2024-05-10

## 2023-02-07 NOTE — PROGRESS NOTES
Michelle Epstein's goals for this visit include: Just following up.     She requests these members of her care team be copied on today's visit information: PCP    PCP: Mindy Fink    Referring Provider:  Kade Luu MD  [address] [place] [id number]    /84 (BP Location: Left arm, Patient Position: Sitting, Cuff Size: Adult Regular)   Pulse 95   SpO2 97%     Do you need any medication refills at today's visit? No.    Ricky Ly, EMT  Clinic Support  [facility]    [phone]    Employed by [employer]

## 2023-02-07 NOTE — TELEPHONE ENCOUNTER
We have not been able to reach the patient by telephone to set up a delivery date. If your office speaks with the patient, please ask them to call us at [phone] or [phone]    Thank you,  Nanci Messer  Pharmacy Technician II  [facility]
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
## 2023-02-09 RX ORDER — FLUTICASONE FUROATE AND VILANTEROL 200; 25 UG/1; UG/1
1 POWDER RESPIRATORY (INHALATION) DAILY
Qty: 60 EACH | Refills: 11 | Status: SHIPPED | OUTPATIENT
Start: 2023-02-09 | End: 2024-03-28

## 2023-02-09 NOTE — TELEPHONE ENCOUNTER
PRIOR AUTHORIZATION DENIED    Medication: mometasone-formoterol (DULERA) 200-5 MCG/ACT inhaler--DENIED    Denial Date: 2/9/2023    Denial Rational: Patient needs to try and fail Breo AND generic Symbicort    Appeal Information:

## 2023-02-09 NOTE — TELEPHONE ENCOUNTER
PA Dulera denied, pt must try/fail Breo or generic Symbicort.  Rx Breo pended for provider approval if appropriate.

## 2023-02-09 NOTE — TELEPHONE ENCOUNTER
PA Initiation    Medication: mometasone-formoterol (DULERA) 200-5 MCG/ACT inhaler   Insurance Company: [employer] - Phone [phone] Fax [phone]  Pharmacy Filling the Rx: [id number] - [place] - [address]  Filling Pharmacy Phone: [phone]  Filling Pharmacy Fax: [phone]  Start Date: 2/8/2023

## 2023-02-13 ENCOUNTER — MYC MEDICAL ADVICE (OUTPATIENT)
Dept: FAMILY MEDICINE | Facility: CLINIC | Age: 38
End: 2023-02-13
Payer: COMMERCIAL

## 2023-02-14 ENCOUNTER — TELEPHONE (OUTPATIENT)
Dept: FAMILY MEDICINE | Facility: CLINIC | Age: 38
End: 2023-02-14
Payer: COMMERCIAL

## 2023-02-14 NOTE — TELEPHONE ENCOUNTER
Forms/Letter Request    Type of form/letter: PA form from [facility] for Contrave    Have you been seen for this request: N/A    Do we have the form/letter: Yes: placed in Mindy Fink forms in basket for review    When is form/letter needed by: ASAP    How would you like the form/letter returned: Fax  [phone]

## 2023-02-15 NOTE — TELEPHONE ENCOUNTER
Form filled out.. please make a copy to scan into chart then fax back to number on form.  Thank you,  MIKE Chatterjee, NP-C  [facility]

## 2023-02-21 ENCOUNTER — TELEPHONE (OUTPATIENT)
Dept: FAMILY MEDICINE | Facility: CLINIC | Age: 38
End: 2023-02-21
Payer: COMMERCIAL

## 2023-02-21 NOTE — TELEPHONE ENCOUNTER
Forms/Letter Request    Type of form/letter: [facility] requesting additional info apart from previous form provider completed on 2/14 to process PA request.    Have you been seen for this request: N/A    Do we have the form/letter: Yes: placed in Mindy Fink forms in basket for review    When is form/letter needed by: ASAP    How would you like the form/letter returned: Fax  [phone]

## 2023-02-22 NOTE — TELEPHONE ENCOUNTER
Forms signed. Please scan into chart then fax back.  Thank you,  MIKE Chatterjee, NP-C  [facility]

## 2023-02-27 LAB
EXPTIME-PRE: 7.46 SEC
FEF2575-%PRED-PRE: 123 %
FEF2575-PRE: 3.9 L/SEC
FEF2575-PRED: 3.16 L/SEC
FEFMAX-%PRED-PRE: 110 %
FEFMAX-PRE: 7.89 L/SEC
FEFMAX-PRED: 7.15 L/SEC
FEV1-%PRED-PRE: 111 %
FEV1-PRE: 3.29 L
FEV1FEV6-PRE: 85 %
FEV1FEV6-PRED: 84 %
FEV1FVC-PRE: 85 %
FEV1FVC-PRED: 84 %
FIFMAX-PRE: 5.12 L/SEC
FVC-%PRED-PRE: 109 %
FVC-PRE: 3.87 L
FVC-PRED: 3.54 L

## 2023-02-28 ENCOUNTER — MYC MEDICAL ADVICE (OUTPATIENT)
Dept: TRANSPLANT | Facility: CLINIC | Age: 38
End: 2023-02-28
Payer: COMMERCIAL

## 2023-02-28 DIAGNOSIS — R11.0 NAUSEA: ICD-10-CM

## 2023-02-28 DIAGNOSIS — J47.9 ADULT BRONCHIECTASIS (H): Primary | ICD-10-CM

## 2023-03-02 RX ORDER — SCOLOPAMINE TRANSDERMAL SYSTEM 1 MG/1
1 PATCH, EXTENDED RELEASE TRANSDERMAL
Qty: 10 PATCH | Refills: 0 | Status: SHIPPED | OUTPATIENT
Start: 2023-03-02 | End: 2023-06-27

## 2023-03-02 RX ORDER — LEVOFLOXACIN 750 MG/1
750 TABLET, FILM COATED ORAL DAILY
Qty: 7 TABLET | Refills: 0 | Status: SHIPPED | OUTPATIENT
Start: 2023-03-02 | End: 2023-06-27

## 2023-03-02 RX ORDER — PREDNISONE 20 MG/1
40 TABLET ORAL DAILY
Qty: 10 TABLET | Refills: 0 | Status: SHIPPED | OUTPATIENT
Start: 2023-03-02 | End: 2023-06-27

## 2023-03-07 DIAGNOSIS — E66.3 OVERWEIGHT: ICD-10-CM

## 2023-03-07 RX ORDER — BUPROPION HYDROCHLORIDE 75 MG/1
TABLET ORAL
Qty: 30 TABLET | Refills: 0 | Status: SHIPPED | OUTPATIENT
Start: 2023-03-07 | End: 2023-06-29

## 2023-03-24 ENCOUNTER — E-VISIT (OUTPATIENT)
Dept: URGENT CARE | Facility: CLINIC | Age: 38
End: 2023-03-24
Payer: COMMERCIAL

## 2023-03-24 DIAGNOSIS — U07.1 INFECTION DUE TO 2019 NOVEL CORONAVIRUS: Primary | ICD-10-CM

## 2023-03-24 PROCEDURE — 99207 PR NO CHARGE LOS: CPT | Performed by: PHYSICIAN ASSISTANT

## 2023-03-24 NOTE — TELEPHONE ENCOUNTER
Hali Martinez,,    We do not prescribe covid medications via E-visits.  Being that you are pregnant you will want to discuss this with your OB/GYN first,  You could try to talk to our virtual UC.  If you would like to inquire about covid medications please visit our virtual Urgent Care and set up a virtual appt with them to discuss possible covid treatment options.    Thank you  KIANA BradleyC

## 2023-03-28 DIAGNOSIS — F32.1 MODERATE SINGLE CURRENT EPISODE OF MAJOR DEPRESSIVE DISORDER (H): ICD-10-CM

## 2023-03-28 DIAGNOSIS — M62.830 BACK MUSCLE SPASM: ICD-10-CM

## 2023-03-28 DIAGNOSIS — F41.9 ANXIETY: ICD-10-CM

## 2023-03-28 RX ORDER — METHOCARBAMOL 750 MG/1
750 TABLET, FILM COATED ORAL 3 TIMES DAILY PRN
Qty: 60 TABLET | Refills: 1 | Status: SHIPPED | OUTPATIENT
Start: 2023-03-28

## 2023-03-28 RX ORDER — BUPROPION HYDROCHLORIDE 300 MG/1
300 TABLET ORAL EVERY MORNING
Qty: 90 TABLET | Refills: 1 | Status: SHIPPED | OUTPATIENT
Start: 2023-03-28 | End: 2023-08-18

## 2023-03-28 NOTE — TELEPHONE ENCOUNTER
.    Medication Question or Refill    Contacts       Type Contact Phone/Fax    03/28/2023 12:50 PM CDT Fax (Incoming) Michelle Epstein (Self) [phone] (M)          What medication are you calling about (include dose and sig)?:   METHOCARBAMOL 750MG TABS 750    BUPROPION HCL ER (XL) 300MG TB24 300    Preferred Pharmacy:    [id number]  [place], [place] - [address] [place] [address]  [address] [place] [address]  Phone: [phone] Fax: [phone]      Controlled Substance Agreement on file:   CSA -- Patient Level:    CSA: None found at the patient level.       Who prescribed the medication?: PATTI HERNANDEZ    Do you need a refill? Yes

## 2023-04-02 ENCOUNTER — E-VISIT (OUTPATIENT)
Dept: FAMILY MEDICINE | Facility: CLINIC | Age: 38
End: 2023-04-02
Payer: COMMERCIAL

## 2023-04-02 DIAGNOSIS — H65.92 OME (OTITIS MEDIA WITH EFFUSION), LEFT: Primary | ICD-10-CM

## 2023-04-02 PROCEDURE — 99421 OL DIG E/M SVC 5-10 MIN: CPT | Performed by: NURSE PRACTITIONER

## 2023-04-03 DIAGNOSIS — R11.0 NAUSEA: ICD-10-CM

## 2023-04-03 RX ORDER — SCOLOPAMINE TRANSDERMAL SYSTEM 1 MG/1
1 PATCH, EXTENDED RELEASE TRANSDERMAL
Qty: 10 PATCH | Refills: 0 | OUTPATIENT
Start: 2023-04-03

## 2023-04-03 RX ORDER — PREDNISONE 20 MG/1
20 TABLET ORAL DAILY
Qty: 5 TABLET | Refills: 0 | Status: SHIPPED | OUTPATIENT
Start: 2023-04-03 | End: 2023-04-08

## 2023-04-03 RX ORDER — OFLOXACIN 3 MG/ML
5 SOLUTION AURICULAR (OTIC) 2 TIMES DAILY
Qty: 3 ML | Refills: 0 | Status: SHIPPED | OUTPATIENT
Start: 2023-04-03 | End: 2023-04-08

## 2023-04-03 NOTE — PATIENT INSTRUCTIONS
Thank you for choosing us for your care. I have placed an order for a prescription so that you can start treatment. View your full visit summary for details by clicking on the link below. Your pharmacist will able to address any questions you may have about the medication.     If you're not feeling better within 5-7 days, please schedule an appointment.  You can schedule an appointment right here in [facility], or call [phone]  If the visit is for the same symptoms as your eVisit, we'll refund the cost of your eVisit if seen within seven days.

## 2023-04-23 ENCOUNTER — HEALTH MAINTENANCE LETTER (OUTPATIENT)
Age: 38
End: 2023-04-23

## 2023-05-01 DIAGNOSIS — F32.1 MODERATE SINGLE CURRENT EPISODE OF MAJOR DEPRESSIVE DISORDER (H): ICD-10-CM

## 2023-05-01 RX ORDER — HYDROXYZINE HYDROCHLORIDE 25 MG/1
25-50 TABLET, FILM COATED ORAL EVERY 6 HOURS PRN
Qty: 30 TABLET | Refills: 0 | Status: SHIPPED | OUTPATIENT
Start: 2023-05-01 | End: 2023-11-07

## 2023-05-01 NOTE — PROGRESS NOTES
Medication Question or Refill    Contacts       Type Contact Phone/Fax    05/01/2023 09:49 AM CDT Fax (Incoming) [id number] - [place] - [address] [facility] (Pharmacy) [phone]          What medication are you calling about (include dose and sig)?:   hydrOXYzine (ATARAX) 25 MG tablet         Preferred Pharmacy:  [facility]acy #[id number] - [place] - [address] [facility] [address] [facility]  [id number] - [place] - [address] [facility]  [address] [facility] [address]  [place] [address]  Phone: [phone] Fax: [phone]      Controlled Substance Agreement on file:   CSA -- Patient Level:    CSA: None found at the patient level.       Who prescribed the medication?: Mindy Fink      Do you need a refill? Yes

## 2023-05-11 ENCOUNTER — TELEPHONE (OUTPATIENT)
Facility: CLINIC | Age: 38
End: 2023-05-11
Payer: COMMERCIAL

## 2023-05-11 ENCOUNTER — TELEPHONE (OUTPATIENT)
Dept: PULMONOLOGY | Facility: CLINIC | Age: 38
End: 2023-05-11
Payer: COMMERCIAL

## 2023-05-11 NOTE — TELEPHONE ENCOUNTER
Prior Authorization Approval    Authorization Effective Date: 4/9/2023  Authorization Expiration Date: 5/8/2024  Medication: Dupixent 300mg/2mL Pen  Approved Dose/Quantity: 4mL per 28 days  Reference #: [id number]   Insurance Company: [employer] - Phone [phone] Fax [phone]  Expected CoPay: $35.00     CoPay Card Available: No    Foundation Assistance Needed:    Which Pharmacy is filling the prescription (Not needed for infusion/clinic administered): [facility] - [place], [place] - [address]  Pharmacy Notified: Yes  Patient Notified: No

## 2023-05-16 ENCOUNTER — CARE COORDINATION (OUTPATIENT)
Dept: PULMONOLOGY | Facility: CLINIC | Age: 38
End: 2023-05-16
Payer: COMMERCIAL

## 2023-05-16 NOTE — PROGRESS NOTES
Patient messaged in through [facility] 5/16 with following symptoms- not feeling well for the last week. Worsening cough,  Wheezing, sinuses hurt, pressure in face and cannot feel teeth. Patient reports symptoms happens with sinus infections. Denies fever. Requesting Levaquin and Prednisone.   Discussed with Dr Luu. Plan as follows.    Levaquin 750 mg daily for 21 days and prednisone will be tapered as follows-   40 mg daily x 3 days THEN  30 mg daily x 3 days THEN  20 mg daily x 3 days THEN  10 mg daily x 3 days.   Instructed patient to hold Azithromycin while taking Levaquin.  Patient verbalized understanding.  Nika Miller RN

## 2023-05-17 ENCOUNTER — TELEPHONE (OUTPATIENT)
Dept: FAMILY MEDICINE | Facility: CLINIC | Age: 38
End: 2023-05-17

## 2023-05-17 DIAGNOSIS — E78.1 HYPERTRIGLYCERIDEMIA: ICD-10-CM

## 2023-05-17 RX ORDER — FENOFIBRATE 54 MG/1
54 TABLET ORAL DAILY
Qty: 30 TABLET | Refills: 0 | Status: SHIPPED | OUTPATIENT
Start: 2023-05-17 | End: 2023-06-21

## 2023-05-17 NOTE — TELEPHONE ENCOUNTER
Given one month- no further refills without fasting labs and face to face visit - assist with scheduling physical with whomever wants to establish care- same day is not appropriate

## 2023-05-17 NOTE — TELEPHONE ENCOUNTER
Medication Question or Refill        What medication are you calling about (include dose and sig)?: fenofibrate (LOFIBRA) 54 MG tablet    Controlled Substance Agreement on file:   CSA -- Patient Level:    CSA: None found at the patient level.       Who prescribed the medication?:Mindy Fink    Do you have any questions or concerns? Yes

## 2023-05-29 DIAGNOSIS — J47.9 BRONCHIECTASIS WITHOUT ACUTE EXACERBATION (H): ICD-10-CM

## 2023-05-30 RX ORDER — LEVOFLOXACIN 750 MG/1
TABLET, FILM COATED ORAL
Qty: 21 TABLET | Refills: 0 | OUTPATIENT
Start: 2023-05-30

## 2023-05-31 ENCOUNTER — TELEPHONE (OUTPATIENT)
Dept: PULMONOLOGY | Facility: CLINIC | Age: 38
End: 2023-05-31
Payer: COMMERCIAL

## 2023-05-31 NOTE — TELEPHONE ENCOUNTER
PA Initiation    Medication: DUPIXENT 300 MG/2ML SC SOPN  Insurance Company: [employer] - Phone [phone] Fax [phone]  Pharmacy Filling the Rx: [facility] - [place], [place] - [facility] [address]  Filling Pharmacy Phone: [phone]  Filling Pharmacy Fax: [phone]  Start Date: 5/31/2023    Key: [id number]

## 2023-05-31 NOTE — TELEPHONE ENCOUNTER
Prior Authorization Approval    Medication: DUPIXENT 300 MG/2ML SC SOPN  Authorization Effective Date: 5/1/2023  Authorization Expiration Date: 5/30/2024  Approved Dose/Quantity: 4mL per 28 days  Reference #: Key: [id number]   Insurance Company: [employer] - Phone [phone] Fax [phone]  Expected CoPay: $35.00     CoPay Card Available: No  (has PMAP plan)  Financial Assistance Needed: No  Which Pharmacy is filling the prescription: [facility] - [place], [place] - [address] [place] [address] [facility]  Pharmacy Notified: No (Renewal)   Patient Notified:  No (Renewal)

## 2023-06-26 ENCOUNTER — MYC MEDICAL ADVICE (OUTPATIENT)
Dept: FAMILY MEDICINE | Facility: CLINIC | Age: 38
End: 2023-06-26
Payer: COMMERCIAL

## 2023-06-26 DIAGNOSIS — Z13.1 SCREENING FOR DIABETES MELLITUS: ICD-10-CM

## 2023-06-26 DIAGNOSIS — R53.83 FATIGUE, UNSPECIFIED TYPE: ICD-10-CM

## 2023-06-26 DIAGNOSIS — Z13.220 SCREENING FOR HYPERLIPIDEMIA: ICD-10-CM

## 2023-06-26 DIAGNOSIS — Z13.21 ENCOUNTER FOR VITAMIN DEFICIENCY SCREENING: Primary | ICD-10-CM

## 2023-06-26 DIAGNOSIS — Z13.0 SCREENING FOR DEFICIENCY ANEMIA: ICD-10-CM

## 2023-06-27 NOTE — TELEPHONE ENCOUNTER
Preventative visit on 8/18/2023.    Routing to provider to review and advise on previsit lab request.    BERRY Garvey  [facility] Triage

## 2023-06-28 ENCOUNTER — LAB (OUTPATIENT)
Dept: LAB | Facility: CLINIC | Age: 38
End: 2023-06-28
Payer: COMMERCIAL

## 2023-06-28 DIAGNOSIS — Z13.220 SCREENING FOR HYPERLIPIDEMIA: ICD-10-CM

## 2023-06-28 DIAGNOSIS — Z13.0 SCREENING FOR DEFICIENCY ANEMIA: ICD-10-CM

## 2023-06-28 DIAGNOSIS — R53.83 FATIGUE, UNSPECIFIED TYPE: ICD-10-CM

## 2023-06-28 DIAGNOSIS — Z13.21 ENCOUNTER FOR VITAMIN DEFICIENCY SCREENING: ICD-10-CM

## 2023-06-28 DIAGNOSIS — Z13.1 SCREENING FOR DIABETES MELLITUS: ICD-10-CM

## 2023-06-28 LAB
BASOPHILS # BLD AUTO: 0 10E3/UL (ref 0–0.2)
BASOPHILS NFR BLD AUTO: 1 %
EOSINOPHIL # BLD AUTO: 1.7 10E3/UL (ref 0–0.7)
EOSINOPHIL NFR BLD AUTO: 19 %
ERYTHROCYTE [DISTWIDTH] IN BLOOD BY AUTOMATED COUNT: 13 % (ref 10–15)
HCT VFR BLD AUTO: 41.8 % (ref 35–47)
HGB BLD-MCNC: 13.6 G/DL (ref 11.7–15.7)
IMM GRANULOCYTES # BLD: 0 10E3/UL
IMM GRANULOCYTES NFR BLD: 0 %
LYMPHOCYTES # BLD AUTO: 1.8 10E3/UL (ref 0.8–5.3)
LYMPHOCYTES NFR BLD AUTO: 21 %
MCH RBC QN AUTO: 29.4 PG (ref 26.5–33)
MCHC RBC AUTO-ENTMCNC: 32.5 G/DL (ref 31.5–36.5)
MCV RBC AUTO: 90 FL (ref 78–100)
MONOCYTES # BLD AUTO: 0.3 10E3/UL (ref 0–1.3)
MONOCYTES NFR BLD AUTO: 4 %
NEUTROPHILS # BLD AUTO: 4.9 10E3/UL (ref 1.6–8.3)
NEUTROPHILS NFR BLD AUTO: 56 %
PLATELET # BLD AUTO: 280 10E3/UL (ref 150–450)
RBC # BLD AUTO: 4.63 10E6/UL (ref 3.8–5.2)
WBC # BLD AUTO: 8.8 10E3/UL (ref 4–11)

## 2023-06-28 PROCEDURE — 80061 LIPID PANEL: CPT

## 2023-06-28 PROCEDURE — 36415 COLL VENOUS BLD VENIPUNCTURE: CPT

## 2023-06-28 PROCEDURE — 82607 VITAMIN B-12: CPT

## 2023-06-28 PROCEDURE — 82306 VITAMIN D 25 HYDROXY: CPT

## 2023-06-28 PROCEDURE — 80050 GENERAL HEALTH PANEL: CPT

## 2023-06-29 DIAGNOSIS — E78.1 HYPERTRIGLYCERIDEMIA: Primary | ICD-10-CM

## 2023-06-29 LAB
ALBUMIN SERPL BCG-MCNC: 4.3 G/DL (ref 3.5–5.2)
ALP SERPL-CCNC: 34 U/L (ref 35–104)
ALT SERPL W P-5'-P-CCNC: 17 U/L (ref 0–50)
ANION GAP SERPL CALCULATED.3IONS-SCNC: 13 MMOL/L (ref 7–15)
AST SERPL W P-5'-P-CCNC: 19 U/L (ref 0–45)
BILIRUB SERPL-MCNC: 0.2 MG/DL
BUN SERPL-MCNC: 10 MG/DL (ref 6–20)
CALCIUM SERPL-MCNC: 9.2 MG/DL (ref 8.6–10)
CHLORIDE SERPL-SCNC: 106 MMOL/L (ref 98–107)
CHOLEST SERPL-MCNC: 215 MG/DL
CREAT SERPL-MCNC: 0.94 MG/DL (ref 0.51–0.95)
DEPRECATED CALCIDIOL+CALCIFEROL SERPL-MC: 57 UG/L (ref 20–75)
DEPRECATED HCO3 PLAS-SCNC: 20 MMOL/L (ref 22–29)
GFR SERPL CREATININE-BSD FRML MDRD: 79 ML/MIN/1.73M2
GLUCOSE SERPL-MCNC: 79 MG/DL (ref 70–99)
HDLC SERPL-MCNC: 49 MG/DL
LDLC SERPL CALC-MCNC: 94 MG/DL
NONHDLC SERPL-MCNC: 166 MG/DL
POTASSIUM SERPL-SCNC: 4.3 MMOL/L (ref 3.4–5.3)
PROT SERPL-MCNC: 7 G/DL (ref 6.4–8.3)
SODIUM SERPL-SCNC: 139 MMOL/L (ref 136–145)
TRIGL SERPL-MCNC: 358 MG/DL
TSH SERPL DL<=0.005 MIU/L-ACNC: 2.7 UIU/ML (ref 0.3–4.2)
VIT B12 SERPL-MCNC: 1357 PG/ML (ref 232–1245)

## 2023-06-29 RX ORDER — FENOFIBRATE 145 MG/1
145 TABLET, COATED ORAL DAILY
Qty: 30 TABLET | Refills: 1 | Status: SHIPPED | OUTPATIENT
Start: 2023-06-29 | End: 2023-08-16

## 2023-06-29 NOTE — RESULT ENCOUNTER NOTE
Dear Michelle  Your triglycerides are quite high.  Increase fenofibrate to 145 mg daily and repeat fasting labs in 8 weeks.   Your B12 level was high - back off on supplements   Your electrolytes, blood sugar, kidney function and liver function were normal.     Your blood counts and hemoglobin were normal.   Please call or MyChart my office with any questions or concerns.   Renita Heck, PAC

## 2023-06-29 NOTE — RESULT ENCOUNTER NOTE
Dear Michelle  Your vitamin D level was normal.  Continue vitamin D supplements as you have been taking.   Please call or MyChart my office with any questions or concerns.   Renita Heck, PAC

## 2023-07-09 ENCOUNTER — TELEPHONE (OUTPATIENT)
Dept: FAMILY MEDICINE | Facility: CLINIC | Age: 38
End: 2023-07-09
Payer: COMMERCIAL

## 2023-07-09 DIAGNOSIS — B37.31 YEAST INFECTION OF THE VAGINA: ICD-10-CM

## 2023-07-10 RX ORDER — FLUCONAZOLE 150 MG/1
150 TABLET ORAL ONCE
Qty: 1 TABLET | Refills: 0 | OUTPATIENT
Start: 2023-07-10 | End: 2023-07-10

## 2023-07-12 ENCOUNTER — TELEPHONE (OUTPATIENT)
Dept: FAMILY MEDICINE | Facility: CLINIC | Age: 38
End: 2023-07-12

## 2023-07-12 DIAGNOSIS — Z30.011 ENCOUNTER FOR INITIAL PRESCRIPTION OF CONTRACEPTIVE PILLS: ICD-10-CM

## 2023-07-12 DIAGNOSIS — B37.31 YEAST INFECTION OF THE VAGINA: ICD-10-CM

## 2023-07-12 RX ORDER — DROSPIRENONE AND ETHINYL ESTRADIOL 0.02-3(28)
1 KIT ORAL DAILY
Qty: 28 TABLET | Refills: 1 | Status: SHIPPED | OUTPATIENT
Start: 2023-07-12 | End: 2023-08-18

## 2023-07-12 RX ORDER — FLUCONAZOLE 150 MG/1
150 TABLET ORAL
Qty: 1 TABLET | Refills: 0 | Status: SHIPPED | OUTPATIENT
Start: 2023-07-12 | End: 2023-08-30

## 2023-07-12 NOTE — TELEPHONE ENCOUNTER
Given one month oral contraceptive pill with one refill to make it to appointment  Given one diflucan to make it to appointment   Needs to keep upcoming appointment as scheduled. - please call and advise

## 2023-07-12 NOTE — TELEPHONE ENCOUNTER
Upon chart review, patient was previously seeing Mindy Fink. Patient is scheduled for an appointment with Renita Heck next month.     Appointments in Next Year    Aug 18, 2023  2:30 PM  (Arrive by 2:10 PM)  Preventative Adult Visit with Renita Heck PA-C  [facility] ([facility] ) [phone]        Will route medication refill request for review to see if patient can get a yue refill prior to her scheduled appointment.     Cheryl Krishnamurthy RN

## 2023-07-13 ENCOUNTER — MYC MEDICAL ADVICE (OUTPATIENT)
Dept: FAMILY MEDICINE | Facility: CLINIC | Age: 38
End: 2023-07-13
Payer: COMMERCIAL

## 2023-07-13 DIAGNOSIS — Z13.1 SCREENING FOR DIABETES MELLITUS: Primary | ICD-10-CM

## 2023-07-13 NOTE — TELEPHONE ENCOUNTER
RN called patient and LVM to call clinic at [phone].     If patient calls back please relay provider message below.    BERRY Garvey  [facility] Triage

## 2023-07-22 ENCOUNTER — OFFICE VISIT (OUTPATIENT)
Dept: URGENT CARE | Facility: URGENT CARE | Age: 38
End: 2023-07-22
Payer: COMMERCIAL

## 2023-07-22 VITALS
SYSTOLIC BLOOD PRESSURE: 116 MMHG | DIASTOLIC BLOOD PRESSURE: 79 MMHG | WEIGHT: 194 LBS | BODY MASS INDEX: 32.28 KG/M2 | OXYGEN SATURATION: 98 % | TEMPERATURE: 97.8 F | HEART RATE: 111 BPM | RESPIRATION RATE: 12 BRPM

## 2023-07-22 DIAGNOSIS — H60.392 INFECTIVE OTITIS EXTERNA, LEFT: Primary | ICD-10-CM

## 2023-07-22 PROCEDURE — 99213 OFFICE O/P EST LOW 20 MIN: CPT | Performed by: NURSE PRACTITIONER

## 2023-07-22 RX ORDER — OFLOXACIN 3 MG/ML
10 SOLUTION AURICULAR (OTIC) DAILY
Qty: 4 ML | Refills: 0 | Status: SHIPPED | OUTPATIENT
Start: 2023-07-22 | End: 2023-07-29

## 2023-07-22 NOTE — PROGRESS NOTES
Assessment & Plan     Infective otitis externa, left    - ofloxacin (FLOXIN) 0.3 % otic solution  Dispense: 4 mL; Refill: 0     Prescription sent to pharmacy for ofloxacin daily for 7 days for left otitis externa. Keep ears clean and dry. Tylenol, warm pack, ice as needed.     Follow-up with PCP if symptoms persist for 3 days, and sooner if symptoms worsen or new symptoms develop.     Discussed red flag symptoms which warrant immediate visit in emergency room    All questions were answered and patient verbalized understanding. AVS reviewed with patient.     Cecile Cardoza, DNP, APRN, CNP 7/22/2023 3:41 PM  [facility] [place]        Cristiano Martinez is a 38 year old female who presents to clinic today with her daughter for the following health issues:  Chief Complaint   Patient presents with     Ear Problem     Per pt pain in the left ear radiating down into jaw and facial area.       Patient presents for evaluation of left ear pain. Pain radiates down into jaw. Pain is worse with touching or laying on left ear. Associated symptoms: none. Denies fever, sore throat, ear drainage, change in congestion or cough which she has chronically. No recent swimming.     She has a history of tachycardia, bronchiectasis, asthma and is on daily azithromycin. She has been taking tylenol which hasn't been helping. Not able to take NSAIDs due to bronchiectasis.     Problem list, Medication list, Allergies, and Medical history reviewed in EPIC.    ROS:  Review of systems negative except for noted above        Objective    /79   Pulse 111   Temp 97.8  F (36.6  C) (Tympanic)   Resp 12   Wt 88 kg (194 lb)   SpO2 98%   BMI 32.28 kg/m    Physical Exam  Constitutional:       General: She is not in acute distress.     Appearance: She is not toxic-appearing or diaphoretic.   HENT:      Head: Normocephalic and atraumatic.      Right Ear: Tympanic membrane, ear canal and external ear normal.      Left Ear:  Tympanic membrane normal.      Ears:      Comments: Tenderness with palpation left tragus and pinna. Mild swelling left ear canal with mild erythema, no drainage     Nose: Nose normal.      Mouth/Throat:      Mouth: Mucous membranes are moist.      Pharynx: Oropharynx is clear. No oropharyngeal exudate or posterior oropharyngeal erythema.   Eyes:      Conjunctiva/sclera: Conjunctivae normal.   Cardiovascular:      Rate and Rhythm: Regular rhythm. Tachycardia present.      Heart sounds: Normal heart sounds.   Pulmonary:      Effort: Pulmonary effort is normal. No respiratory distress.      Breath sounds: Normal breath sounds. No wheezing, rhonchi or rales.   Lymphadenopathy:      Cervical: No cervical adenopathy.   Skin:     General: Skin is warm and dry.   Neurological:      Mental Status: She is alert.

## 2023-08-08 DIAGNOSIS — J47.9 BRONCHIECTASIS WITHOUT ACUTE EXACERBATION (H): Primary | ICD-10-CM

## 2023-08-08 DIAGNOSIS — J47.1 BRONCHIECTASIS WITH ACUTE EXACERBATION (H): ICD-10-CM

## 2023-08-08 RX ORDER — LEVOFLOXACIN 750 MG/1
750 TABLET, FILM COATED ORAL DAILY
Qty: 14 TABLET | Refills: 0 | Status: SHIPPED | OUTPATIENT
Start: 2023-08-08 | End: 2023-08-22

## 2023-08-08 RX ORDER — PREDNISONE 10 MG/1
TABLET ORAL
Qty: 15 TABLET | Refills: 0 | Status: SHIPPED | OUTPATIENT
Start: 2023-08-08 | End: 2023-08-18

## 2023-08-10 DIAGNOSIS — J47.9 BRONCHIECTASIS WITHOUT COMPLICATION (H): ICD-10-CM

## 2023-08-10 RX ORDER — AZITHROMYCIN 250 MG/1
250 TABLET, FILM COATED ORAL DAILY
Qty: 30 TABLET | Refills: 11 | Status: SHIPPED | OUTPATIENT
Start: 2023-08-10

## 2023-08-10 NOTE — TELEPHONE ENCOUNTER
Medication:   azithromycin (ZITHROMAX) 250 MG tablet 30 tablet 11 7/26/2022  No   Sig: TAKE 1 TABLET BY MOUTH ONCE DAILY     Date last written: 7/26/22  Dispensed amount: 30 tablet  Refills: 11    Requested Pharmacy: [id number] - [place] - [address] [facility]     Pt's last office visit: 2/6/23  Next scheduled office visit: unknown

## 2023-08-11 ENCOUNTER — TELEPHONE (OUTPATIENT)
Dept: PULMONOLOGY | Facility: CLINIC | Age: 38
End: 2023-08-11
Payer: COMMERCIAL

## 2023-08-11 NOTE — TELEPHONE ENCOUNTER
PA Initiation    Medication: DULERA 200-5 MCG/ACT IN AERO  Insurance Company: [employer] - Phone [phone] Fax [phone]  Pharmacy Filling the Rx: [id number] - [place] - [address] [facility]  Filling Pharmacy Phone: [phone]  Filling Pharmacy Fax:    Start Date: 8/11/2023    Key: [id number]

## 2023-08-15 ENCOUNTER — TELEPHONE (OUTPATIENT)
Dept: PULMONOLOGY | Facility: CLINIC | Age: 38
End: 2023-08-15

## 2023-08-15 ENCOUNTER — LAB (OUTPATIENT)
Dept: LAB | Facility: CLINIC | Age: 38
End: 2023-08-15
Payer: COMMERCIAL

## 2023-08-15 DIAGNOSIS — J45.50 SEVERE PERSISTENT ASTHMA WITHOUT COMPLICATION (H): Primary | ICD-10-CM

## 2023-08-15 DIAGNOSIS — Z13.1 SCREENING FOR DIABETES MELLITUS: ICD-10-CM

## 2023-08-15 DIAGNOSIS — E78.1 HYPERTRIGLYCERIDEMIA: ICD-10-CM

## 2023-08-15 LAB
ALT SERPL W P-5'-P-CCNC: 10 U/L (ref 0–50)
AST SERPL W P-5'-P-CCNC: 19 U/L (ref 0–45)
CHOLEST SERPL-MCNC: 170 MG/DL
HBA1C MFR BLD: 5.5 % (ref 0–5.6)
HDLC SERPL-MCNC: 58 MG/DL
LDLC SERPL CALC-MCNC: 91 MG/DL
NONHDLC SERPL-MCNC: 112 MG/DL
TRIGL SERPL-MCNC: 107 MG/DL

## 2023-08-15 PROCEDURE — 83036 HEMOGLOBIN GLYCOSYLATED A1C: CPT

## 2023-08-15 PROCEDURE — 84450 TRANSFERASE (AST) (SGOT): CPT

## 2023-08-15 PROCEDURE — 36415 COLL VENOUS BLD VENIPUNCTURE: CPT

## 2023-08-15 PROCEDURE — 80061 LIPID PANEL: CPT

## 2023-08-15 PROCEDURE — 84460 ALANINE AMINO (ALT) (SGPT): CPT

## 2023-08-15 RX ORDER — BUDESONIDE AND FORMOTEROL FUMARATE DIHYDRATE 160; 4.5 UG/1; UG/1
2 AEROSOL RESPIRATORY (INHALATION) 2 TIMES DAILY
Qty: 10.2 G | Refills: 11 | Status: SHIPPED | OUTPATIENT
Start: 2023-08-15

## 2023-08-15 ASSESSMENT — ENCOUNTER SYMPTOMS
ARTHRALGIAS: 0
HEARTBURN: 1
CHILLS: 0
HEMATOCHEZIA: 0
PALPITATIONS: 0
DIZZINESS: 0
DIARRHEA: 0
CONSTIPATION: 0
DYSURIA: 0
JOINT SWELLING: 0
BREAST MASS: 0
COUGH: 1
ABDOMINAL PAIN: 0
PARESTHESIAS: 0
NERVOUS/ANXIOUS: 0
FREQUENCY: 0
HEMATURIA: 0
SHORTNESS OF BREATH: 0
NAUSEA: 0
FEVER: 0
MYALGIAS: 0
HEADACHES: 1
WEAKNESS: 0
SORE THROAT: 1
EYE PAIN: 0

## 2023-08-15 NOTE — TELEPHONE ENCOUNTER
Called pt to ask if she has been on Symbicort in the past.  Dulera PA was denied. Left voice message asking if she has been on Symbicort in the past.  I didn't find anything in her chart that stated she has been on this medication. Instructed pt to call our nurse clinic number or leave a MyChart message stating whether she has or has not taken Symbicort in the past.    Noreen Barrientos RN on 8/15/2023 at 3:51 PM

## 2023-08-15 NOTE — TELEPHONE ENCOUNTER
PRIOR AUTHORIZATION DENIED    Medication: DULERA 200-5 MCG/ACT IN AERO  Insurance Company: [employer] - Phone [phone] Fax [phone]  Denial Date: 8/15/2023  Denial Rational: Patient has not tried and failed Symbicort (budesonide-formoterol) or have medical documentation/reasoning to avoid use (I.e. contraindication, etc.)  Appeal Information: Fax [phone] with supporting documentation within 30 days  Patient Notified:

## 2023-08-16 DIAGNOSIS — E78.1 HYPERTRIGLYCERIDEMIA: ICD-10-CM

## 2023-08-16 RX ORDER — FENOFIBRATE 145 MG/1
145 TABLET, COATED ORAL DAILY
Qty: 90 TABLET | Refills: 3 | Status: SHIPPED | OUTPATIENT
Start: 2023-08-16 | End: 2024-07-26

## 2023-08-16 NOTE — RESULT ENCOUNTER NOTE
Dear Michelle  Your cholesterol looks great on the medication.  Continue as prescribed.   Your liver function is normal.  Let's review at your upcoming appointment   Your A1C was normal.  You do not have diabetes.   Please call or MyChart my office with any questions or concerns.   Renita Heck, PAC

## 2023-08-18 ENCOUNTER — OFFICE VISIT (OUTPATIENT)
Dept: FAMILY MEDICINE | Facility: CLINIC | Age: 38
End: 2023-08-18
Payer: COMMERCIAL

## 2023-08-18 VITALS
OXYGEN SATURATION: 98 % | HEART RATE: 99 BPM | RESPIRATION RATE: 26 BRPM | SYSTOLIC BLOOD PRESSURE: 107 MMHG | HEIGHT: 65 IN | BODY MASS INDEX: 31.99 KG/M2 | WEIGHT: 192 LBS | TEMPERATURE: 98.6 F | DIASTOLIC BLOOD PRESSURE: 74 MMHG

## 2023-08-18 DIAGNOSIS — F32.1 MODERATE SINGLE CURRENT EPISODE OF MAJOR DEPRESSIVE DISORDER (H): ICD-10-CM

## 2023-08-18 DIAGNOSIS — F41.9 ANXIETY: ICD-10-CM

## 2023-08-18 DIAGNOSIS — J32.4 CHRONIC PANSINUSITIS: ICD-10-CM

## 2023-08-18 DIAGNOSIS — Z12.4 CERVICAL CANCER SCREENING: ICD-10-CM

## 2023-08-18 DIAGNOSIS — Z30.41 ENCOUNTER FOR SURVEILLANCE OF CONTRACEPTIVE PILLS: ICD-10-CM

## 2023-08-18 DIAGNOSIS — E78.1 HYPERTRIGLYCERIDEMIA: ICD-10-CM

## 2023-08-18 DIAGNOSIS — Z00.00 ROUTINE GENERAL MEDICAL EXAMINATION AT A HEALTH CARE FACILITY: Primary | ICD-10-CM

## 2023-08-18 DIAGNOSIS — H60.393 INFECTIVE OTITIS EXTERNA, BILATERAL: ICD-10-CM

## 2023-08-18 DIAGNOSIS — M62.830 BACK MUSCLE SPASM: ICD-10-CM

## 2023-08-18 PROCEDURE — 87624 HPV HI-RISK TYP POOLED RSLT: CPT | Performed by: PHYSICIAN ASSISTANT

## 2023-08-18 PROCEDURE — 99395 PREV VISIT EST AGE 18-39: CPT | Mod: 25 | Performed by: PHYSICIAN ASSISTANT

## 2023-08-18 PROCEDURE — 90472 IMMUNIZATION ADMIN EACH ADD: CPT | Performed by: PHYSICIAN ASSISTANT

## 2023-08-18 PROCEDURE — 90471 IMMUNIZATION ADMIN: CPT | Performed by: PHYSICIAN ASSISTANT

## 2023-08-18 PROCEDURE — 99214 OFFICE O/P EST MOD 30 MIN: CPT | Mod: 25 | Performed by: PHYSICIAN ASSISTANT

## 2023-08-18 PROCEDURE — G0145 SCR C/V CYTO,THINLAYER,RESCR: HCPCS | Performed by: PHYSICIAN ASSISTANT

## 2023-08-18 PROCEDURE — 90715 TDAP VACCINE 7 YRS/> IM: CPT | Performed by: PHYSICIAN ASSISTANT

## 2023-08-18 PROCEDURE — 90677 PCV20 VACCINE IM: CPT | Performed by: PHYSICIAN ASSISTANT

## 2023-08-18 RX ORDER — FLUTICASONE PROPIONATE 50 MCG
2 SPRAY, SUSPENSION (ML) NASAL DAILY PRN
Qty: 9.9 ML | Refills: 11 | Status: SHIPPED | OUTPATIENT
Start: 2023-08-18

## 2023-08-18 RX ORDER — BUPROPION HYDROCHLORIDE 300 MG/1
300 TABLET ORAL EVERY MORNING
Qty: 90 TABLET | Refills: 1 | Status: SHIPPED | OUTPATIENT
Start: 2023-08-18 | End: 2024-02-01

## 2023-08-18 RX ORDER — CIPROFLOXACIN AND DEXAMETHASONE 3; 1 MG/ML; MG/ML
4 SUSPENSION/ DROPS AURICULAR (OTIC) 2 TIMES DAILY
Qty: 2.8 ML | Refills: 0 | Status: SHIPPED | OUTPATIENT
Start: 2023-08-18 | End: 2023-08-25

## 2023-08-18 RX ORDER — METHOCARBAMOL 750 MG/1
750 TABLET, FILM COATED ORAL 3 TIMES DAILY PRN
Qty: 60 TABLET | Refills: 1 | Status: CANCELLED | OUTPATIENT
Start: 2023-08-18

## 2023-08-18 RX ORDER — DROSPIRENONE AND ETHINYL ESTRADIOL 0.02-3(28)
1 KIT ORAL DAILY
Qty: 84 TABLET | Refills: 3 | Status: SHIPPED | OUTPATIENT
Start: 2023-08-18 | End: 2024-07-11

## 2023-08-18 RX ORDER — HYDROXYZINE HYDROCHLORIDE 25 MG/1
25-50 TABLET, FILM COATED ORAL EVERY 6 HOURS PRN
Qty: 30 TABLET | Refills: 0 | Status: CANCELLED | OUTPATIENT
Start: 2023-08-18

## 2023-08-18 RX ORDER — FENOFIBRATE 54 MG/1
54 TABLET ORAL DAILY
Qty: 84 TABLET | Refills: 3 | Status: SHIPPED | OUTPATIENT
Start: 2023-08-18 | End: 2024-03-28

## 2023-08-18 ASSESSMENT — ENCOUNTER SYMPTOMS
SORE THROAT: 1
PALPITATIONS: 0
PARESTHESIAS: 0
FEVER: 0
HEMATURIA: 0
CHILLS: 0
DIARRHEA: 0
DYSURIA: 0
HEARTBURN: 1
JOINT SWELLING: 0
BREAST MASS: 0
MYALGIAS: 0
CONSTIPATION: 0
HEADACHES: 1
ARTHRALGIAS: 0
WEAKNESS: 0
COUGH: 1
DIZZINESS: 0
NERVOUS/ANXIOUS: 0
HEMATOCHEZIA: 0
ABDOMINAL PAIN: 0
EYE PAIN: 0
NAUSEA: 0
SHORTNESS OF BREATH: 0
FREQUENCY: 0

## 2023-08-18 NOTE — PATIENT INSTRUCTIONS
Continue medications as you have been taking  Follow up with us with a video visit in 6 months for depression and anxiety  Use ciprodex ear drops twice a day for 7 days  Keep follow up with ENT as scheduled   Patient Education    Preventive Health Recommendations  Female Ages 26 - 39  Yearly exam:   See your health care provider every year in order to  Review health changes.   Discuss preventive care.    Review your medicines if you your doctor has prescribed any.    Until age 30: Get a Pap test every three years (more often if you have had an abnormal result).    After age 30: Talk to your doctor about whether you should have a Pap test every 3 years or have a Pap test with HPV screening every 5 years.   You do not need a Pap test if your uterus was removed (hysterectomy) and you have not had cancer.  You should be tested each year for STDs (sexually transmitted diseases), if you're at risk.   Talk to your provider about how often to have your cholesterol checked.  If you are at risk for diabetes, you should have a diabetes test (fasting glucose).  Shots: Get a flu shot each year. Get a tetanus shot every 10 years.   Nutrition:   Eat at least 5 servings of fruits and vegetables each day.  Eat whole-grain bread, whole-wheat pasta and brown rice instead of white grains and rice.  Get adequate Calcium and Vitamin D.     Lifestyle  Exercise at least 150 minutes a week (30 minutes a day, 5 days of the week). This will help you control your weight and prevent disease.  Limit alcohol to one drink per day.  No smoking.   Wear sunscreen to prevent skin cancer.  See your dentist every six months for an exam and cleaning.

## 2023-08-18 NOTE — PROGRESS NOTES
SUBJECTIVE:   CC: Michelle is an 38 year old who presents for preventive health visit.       8/18/2023     2:11 PM   Additional Questions   Roomed by Tanvi CARVER   Accompanied by Self         8/18/2023     2:11 PM   Patient Reported Additional Medications   Patient reports taking the following new medications None       Healthy Habits:     Getting at least 3 servings of Calcium per day:  Yes    Bi-annual eye exam:  NO    Dental care twice a year:  Yes    Sleep apnea or symptoms of sleep apnea:  Daytime drowsiness    Diet:  Regular (no restrictions)    Frequency of exercise:  1 day/week    Duration of exercise:  30-45 minutes    Taking medications regularly:  Yes    Medication side effects:  Not applicable    Additional concerns today:  Yes                  Have you ever done Advance Care Planning? (For example, a Health Directive, POLST, or a discussion with a medical provider or your loved ones about your wishes): No, advance care planning information given to patient to review.  Patient declined advance care planning discussion at this time.    Social History     Tobacco Use    Smoking status: Never    Smokeless tobacco: Never    Tobacco comments:     lives in nonsmoking household    Substance Use Topics    Alcohol use: Not Currently     Comment: rare, not in PG             8/15/2023    10:31 AM   Alcohol Use   Prescreen: >3 drinks/day or >7 drinks/week? Not Applicable     Reviewed orders with patient.  Reviewed health maintenance and updated orders accordingly - Yes  BP Readings from Last 3 Encounters:   08/18/23 107/74   07/22/23 116/79   02/07/23 121/84    Wt Readings from Last 3 Encounters:   08/18/23 87.1 kg (192 lb)   07/22/23 88 kg (194 lb)   02/06/23 88 kg (194 lb)                  Patient Active Problem List   Diagnosis    CARDIOVASCULAR SCREENING; LDL GOAL LESS THAN 160    Adult bronchiectasis (H)    Dry eye syndrome    Thyroid nodule    Encounter for initial prescription of contraceptive pills    Moderate  single current episode of major depressive disorder (H)    Tension headache    Anxiety    Severe persistent asthma, unspecified whether complicated    Eosinophilic asthma    Paroxysmal supraventricular tachycardia (H)    Severe persistent asthma dependent on systemic steroids    Obesity due to excess calories without serious comorbidity, unspecified classification     Past Surgical History:   Procedure Laterality Date    ADENOIDECTOMY  1997    COSMETIC SURGERY  3098-0086    Tummy tuck and breast lift    ENDOSCOPY      GENITOURINARY SURGERY  As a child    Ureter reimplantation    HC COLP CERVIX/UPPER VAGINA W BX CERVIX  2007    neg    NASAL/SINUS POLYPECTOMY  2015    OPTICAL TRACKING SYSTEM ENDOSCOPIC SINUS SURGERY Bilateral 01/11/2016    Procedure: OPTICAL TRACKING SYSTEM ENDOSCOPIC SINUS SURGERY;  Surgeon: Asmita Dallas MD;  Location: [facility] OR    REIMPLANT URETER CHILD  1989    bilateral    SINUS SURGERY  2009    x 2    THYROID BIOPSY  2012    neg    TONSILLECTOMY      TONSILLECTOMY  1997       Social History     Tobacco Use    Smoking status: Never    Smokeless tobacco: Never    Tobacco comments:     lives in nonsmoking household    Substance Use Topics    Alcohol use: Not Currently     Comment: rare, not in PG     Family History   Problem Relation Age of Onset    Neurologic Disorder Mother         brain tumor    Heart Disease Mother         SVT    Depression Mother     Migraines Mother     Hyperlipidemia Mother         high triglycerides    Anxiety Disorder Mother     Diabetes Mother     Mental Illness Mother     Obesity Mother     Alcohol/Drug Father     Cardiovascular Maternal Grandmother     Arthritis Maternal Grandmother     Diabetes Maternal Grandmother     Heart Disease Maternal Grandmother         AAA    Hypertension Maternal Grandmother     Cerebrovascular Disease Maternal Grandmother     Asthma Maternal Grandmother     Hyperlipidemia Maternal Grandmother         both high cholesterol and  hypertriglyceremia     Unknown/Adopted Paternal Grandmother     Unknown/Adopted Paternal Grandfather     Genitourinary Problems Daughter         kidney reflux    Hypertension Maternal Grandfather     Dementia Maternal Grandfather     Neurologic Disorder Son 6        Seizures    Glaucoma No family hx of     Macular Degeneration No family hx of     Cancer No family hx of     Thyroid Disease No family hx of         ,         Current Outpatient Medications   Medication Sig Dispense Refill    albuterol (PROAIR HFA/PROVENTIL HFA/VENTOLIN HFA) 108 (90 Base) MCG/ACT inhaler INHALE TWO PUFFS BY MOUTH EVERY 6 HOURS AS NEEDED FOR WHEEZING OR FOR SHORTNESS OF BREATH Strength: 108 (90 Base) MCG/ACT 8.5 g 11    albuterol (PROVENTIL) (2.5 MG/3ML) 0.083% neb solution INHALE ONE VIAL VIA NEBULIZER FOUR TIMES A  mL 11    azithromycin (ZITHROMAX) 250 MG tablet Take 1 tablet (250 mg) by mouth daily 30 tablet 11    budesonide-formoterol (SYMBICORT) 160-4.5 MCG/ACT Inhaler Inhale 2 puffs into the lungs 2 times daily 10.2 g 11    buPROPion (WELLBUTRIN XL) 300 MG 24 hr tablet Take 1 tablet (300 mg) by mouth every morning 90 tablet 1    ciprofloxacin-dexAMETHasone (CIPRODEX) 0.3-0.1 % otic suspension Place 4 drops into both ears 2 times daily for 7 days 2.8 mL 0    drospirenone-ethinyl estradiol (QUAN) 3-0.02 MG tablet Take 1 tablet by mouth daily 84 tablet 3    dupilumab (DUPIXENT) 300 MG/2ML prefilled pen Inject 2 mLs (300 mg) Subcutaneous every 14 days 2 mL 26    fenofibrate (LOFIBRA) 54 MG tablet Take 1 tablet (54 mg) by mouth daily Due for follow up for further refills 84 tablet 3    fenofibrate (TRICOR) 145 MG tablet Take 1 tablet (145 mg) by mouth daily 90 tablet 3    fluconazole (DIFLUCAN) 150 MG tablet Take 1 tablet (150 mg) by mouth once as needed (yeast infection related to chronic oral antibiotic use) 1 tablet 0    fluocinolone acetonide (DERMA SMOOTHE/FS BODY) 0.01 % external oil To ear for itching 1-2 times a day 50 mL 0     fluticasone (FLONASE) 50 MCG/ACT nasal spray Spray 2 sprays into both nostrils daily as needed for rhinitis or allergies 9.9 mL 11    fluticasone-vilanterol (BREO ELLIPTA) 200-25 MCG/ACT inhaler Inhale 1 puff into the lungs daily 60 each 11    hydrOXYzine (ATARAX) 25 MG tablet Take 1-2 tablets (25-50 mg) by mouth every 6 hours as needed for itching or anxiety 30 tablet 0    levofloxacin (LEVAQUIN) 750 MG tablet Take 1 tablet (750 mg) by mouth daily for 14 days Hold Azithromycin when taking Levofloxacin 14 tablet 0    methocarbamol (ROBAXIN) 750 MG tablet Take 1 tablet (750 mg) by mouth 3 times daily as needed for muscle spasms 60 tablet 1    verapamil ER (VERELAN) 120 MG 24 hr capsule Take 1 capsule (120 mg) by mouth daily 90 capsule 3       Breast Cancer Screening:    FHS-7:        No data to display                Patient under 40 years of age: Routine Mammogram Screening not recommended.   Pertinent mammograms are reviewed under the imaging tab.    History of abnormal Pap smear: NO - age 30-65 PAP every 5 years with negative HPV co-testing recommended      Latest Ref Rng & Units 1/27/2017     2:23 PM 1/27/2017     2:20 PM 6/24/2013    12:00 AM   PAP / HPV   PAP (Historical)  NIL   NIL    HPV 16 DNA NEG  Negative     HPV 18 DNA NEG  Negative     Other HR HPV NEG  Negative       Reviewed and updated as needed this visit by clinical staff    Allergies  Meds              Reviewed and updated as needed this visit by Provider   Tobacco     Med Hx  Surg Hx  Fam Hx  Soc Hx         Patient known to me with history of severe asthma- followed by pulmonary , history of paroxysmal SVT, headache , anxiety , depression presents for annual exam.  History of recurrent otitis and followed by ENT =Chronic left ear infection- given ear drops in urgent care 3 1/2 weeks ago and pain not going away and   Scheduled with ENT end of September ber. Fever. Taking tylenol  improved but not resolved.   Had a little sore throat.   "Postnasal drip and allergies  Not a lot of heartburn  lately   Pain left side of back of neck and tylenol helps   Headache Started with this earache   Wellbutrin working well for depression and anxiety  Is  a nurse in transitional care        10/6/2021    10:59 AM 5/3/2022    11:38 AM 6/26/2023     9:32 AM   PHQ   PHQ-9 Total Score 3 1 1   Q9: Thoughts of better off dead/self-harm past 2 weeks Not at all Not at all Not at all          10/6/2021     6:38 AM 10/6/2021    11:01 AM 6/26/2023     9:32 AM   FELIPA-7 SCORE   Total Score  4 (minimal anxiety) 0 (minimal anxiety)   Total Score 4 4 0          Review of Systems   Constitutional:  Negative for chills and fever.   HENT:  Positive for congestion, ear pain and sore throat. Negative for hearing loss.    Eyes:  Negative for pain and visual disturbance.   Respiratory:  Positive for cough. Negative for shortness of breath.    Cardiovascular:  Negative for chest pain, palpitations and peripheral edema.   Gastrointestinal:  Positive for heartburn. Negative for abdominal pain, constipation, diarrhea, hematochezia and nausea.   Breasts:  Negative for tenderness, breast mass and discharge.   Genitourinary:  Negative for dysuria, frequency, genital sores, hematuria, pelvic pain, urgency, vaginal bleeding and vaginal discharge.   Musculoskeletal:  Negative for arthralgias, joint swelling and myalgias.   Skin:  Negative for rash.   Neurological:  Positive for headaches. Negative for dizziness, weakness and paresthesias.   Psychiatric/Behavioral:  Negative for mood changes. The patient is not nervous/anxious.           OBJECTIVE:   /74 (BP Location: Right arm, Patient Position: Sitting, Cuff Size: Adult Large)   Pulse 99   Temp 98.6  F (37  C) (Oral)   Resp 26   Ht 1.638 m (5' 4.5\")   Wt 87.1 kg (192 lb)   SpO2 98%   BMI 32.45 kg/m    Physical Exam  GENERAL: healthy, alert and no distress  EYES: Eyes grossly normal to inspection, PERRL and conjunctivae and sclerae " normal  HENT: bilateral ear canals with erythema and edema. Normal tympanic membrane. Pain elicited with insertion of speculum of otoscopy, nose and mouth without ulcers or lesions  NECK: no adenopathy, no asymmetry, masses, or scars and thyroid normal to palpation  RESP: expiratory wheezes throughout  BREAST: normal without masses, tenderness or nipple discharge and no palpable axillary masses or adenopathy  CV: regular rate and rhythm, normal S1 S2, no S3 or S4, no murmur, click or rub, no peripheral edema and peripheral pulses strong  ABDOMEN: soft, nontender, no hepatosplenomegaly, no masses and bowel sounds normal   (female): normal female external genitalia, normal urethral meatus, vaginal mucosa pink, moist, well rugated, and normal cervix/adnexa/uterus without masses or discharge  MS: no gross musculoskeletal defects noted, no edema  SKIN: no suspicious lesions or rashes  NEURO: Normal strength and tone, mentation intact and speech normal  PSYCH: mentation appears normal, affect normal/bright, judgement and insight intact, and appearance well groomed    Diagnostic Test Results:  Recent Results (from the past 720 hour(s))   Lipid panel reflex to direct LDL Fasting    Collection Time: 08/15/23  9:14 AM   Result Value Ref Range    Cholesterol 170 <200 mg/dL    Triglycerides 107 <150 mg/dL    Direct Measure HDL 58 >=50 mg/dL    LDL Cholesterol Calculated 91 <=100 mg/dL    Non HDL Cholesterol 112 <130 mg/dL   AST    Collection Time: 08/15/23  9:14 AM   Result Value Ref Range    AST 19 0 - 45 U/L   ALT    Collection Time: 08/15/23  9:14 AM   Result Value Ref Range    ALT 10 0 - 50 U/L   Hemoglobin A1c    Collection Time: 08/15/23  9:14 AM   Result Value Ref Range    Hemoglobin A1C 5.5 0.0 - 5.6 %        ASSESSMENT/PLAN:   (Z00.00) Routine general medical examination at a health care facility  (primary encounter diagnosis)  Comment: benign exam except ears and lungs as well as obesity  Plan:     (Z30.41)  "Encounter for surveillance of contraceptive pills  Comment: refilled quan- really helped acne as well.   Plan: drospirenone-ethinyl estradiol (QUAN) 3-0.02 MG         tablet            (E78.1) Hypertriglyceridemia  Comment: well controlled with current med- refilled for one year   Plan: fenofibrate (LOFIBRA) 54 MG tablet            (J32.4) Chronic pansinusitis  Comment: refilled flonase- followed by ENT   Plan: fluticasone (FLONASE) 50 MCG/ACT nasal spray            (F32.1) Moderate single current episode of major depressive disorder (H)  Comment: symptoms well controlled with wellbutrin .  Plan: buPROPion (WELLBUTRIN XL) 300 MG 24 hr tablet            (M62.830) Back muscle spasm  Comment: symptoms improved with robaxin as needed- doesn't need refill at this time  Plan:     (Z12.4) Cervical cancer screening  Comment: pap pending.   Plan: Pap Screen with HPV - recommended age 30 - 65         years            (F41.9) Anxiety  Comment: symptoms well controlled with wellbutrin  Plan: buPROPion (WELLBUTRIN XL) 300 MG 24 hr tablet            (H60.393) Infective otitis externa, bilateral  Comment: has follow up with ENT planned - not improved whit course of oral prednisone or resolved with ofloxacin ear drops   Plan: ciprofloxacin-dexAMETHasone (CIPRODEX) 0.3-0.1         % otic suspension        Trial of ciprodex     Patient has been advised of split billing requirements and indicates understanding: Yes      COUNSELING:  Reviewed preventive health counseling, as reflected in patient instructions       Regular exercise       Healthy diet/nutrition       Vision screening       Immunizations  Vaccinated for: Pneumococcal and TDAP and Declined: Covid-19 due to Concerns about side effects/safety             Contraception      BMI:   Estimated body mass index is 32.28 kg/m  as calculated from the following:    Height as of 2/6/23: 1.651 m (5' 5\").    Weight as of 7/22/23: 88 kg (194 lb).   Weight management plan: Discussed healthy " diet and exercise guidelines      She reports that she has never smoked. She has never used smokeless tobacco.          Renita Heck PA-C  [facility]

## 2023-08-18 NOTE — NURSING NOTE
Prior to immunization administration, verified patients identity using patient s name and date of birth. Please see Immunization Activity for additional information.     Screening Questionnaire for Adult Immunization    Are you sick today?   No   Do you have allergies to medications, food, a vaccine component or latex?   No   Have you ever had a serious reaction after receiving a vaccination?   No   Do you have a long-term health problem with heart, lung, kidney, or metabolic disease (e.g., diabetes), asthma, a blood disorder, no spleen, complement component deficiency, a cochlear implant, or a spinal fluid leak?  Are you on long-term aspirin therapy?   No   Do you have cancer, leukemia, HIV/AIDS, or any other immune system problem?   No   Do you have a parent, brother, or sister with an immune system problem?   No   In the past 3 months, have you taken medications that affect  your immune system, such as prednisone, other steroids, or anticancer drugs; drugs for the treatment of rheumatoid arthritis, Crohn s disease, or psoriasis; or have you had radiation treatments?   No   Have you had a seizure, or a brain or other nervous system problem?   No   During the past year, have you received a transfusion of blood or blood    products, or been given immune (gamma) globulin or antiviral drug?   No   For women: Are you pregnant or is there a chance you could become       pregnant during the next month?   No   Have you received any vaccinations in the past 4 weeks?   No     Immunization questionnaire answers were all negative.      Patient instructed to remain in clinic for 15 minutes afterwards, and to report any adverse reactions.     Screening performed by Devi Delgado MA on 8/18/2023 at 2:45 PM.

## 2023-08-23 LAB
BKR LAB AP GYN ADEQUACY: NORMAL
BKR LAB AP GYN INTERPRETATION: NORMAL
BKR LAB AP HPV REFLEX: NORMAL
BKR LAB AP PREVIOUS ABNORMAL: NORMAL
PATH REPORT.COMMENTS IMP SPEC: NORMAL
PATH REPORT.COMMENTS IMP SPEC: NORMAL
PATH REPORT.RELEVANT HX SPEC: NORMAL

## 2023-08-24 LAB
HUMAN PAPILLOMA VIRUS 16 DNA: NEGATIVE
HUMAN PAPILLOMA VIRUS 18 DNA: NEGATIVE
HUMAN PAPILLOMA VIRUS FINAL DIAGNOSIS: NORMAL
HUMAN PAPILLOMA VIRUS OTHER HR: NEGATIVE

## 2023-08-29 ENCOUNTER — MYC MEDICAL ADVICE (OUTPATIENT)
Dept: FAMILY MEDICINE | Facility: CLINIC | Age: 38
End: 2023-08-29
Payer: COMMERCIAL

## 2023-08-29 DIAGNOSIS — B37.31 YEAST INFECTION OF THE VAGINA: ICD-10-CM

## 2023-08-30 ENCOUNTER — MYC MEDICAL ADVICE (OUTPATIENT)
Dept: PULMONOLOGY | Facility: CLINIC | Age: 38
End: 2023-08-30
Payer: COMMERCIAL

## 2023-08-30 ENCOUNTER — TELEPHONE (OUTPATIENT)
Dept: FAMILY MEDICINE | Facility: CLINIC | Age: 38
End: 2023-08-30
Payer: COMMERCIAL

## 2023-08-30 DIAGNOSIS — J47.9 BRONCHIECTASIS WITHOUT ACUTE EXACERBATION (H): ICD-10-CM

## 2023-08-30 DIAGNOSIS — J47.9 BRONCHIECTASIS WITHOUT ACUTE EXACERBATION (H): Primary | ICD-10-CM

## 2023-08-30 RX ORDER — FLUCONAZOLE 150 MG/1
TABLET ORAL
Qty: 1 TABLET | Refills: 0 | Status: SHIPPED | OUTPATIENT
Start: 2023-08-30 | End: 2024-05-22

## 2023-08-30 NOTE — TELEPHONE ENCOUNTER
[facility] requesting a refill on Prednisone 20 mg tabs Qty 5.   Take one tablet by mouth once daily for 5 days.     I was unable to pend the medication as it looks like it was discontinued.

## 2023-08-31 RX ORDER — PREDNISONE 10 MG/1
TABLET ORAL
Qty: 15 TABLET | Refills: 0 | Status: SHIPPED | OUTPATIENT
Start: 2023-08-31 | End: 2023-09-01

## 2023-08-31 RX ORDER — ALBUTEROL SULFATE 0.83 MG/ML
SOLUTION RESPIRATORY (INHALATION)
Qty: 360 ML | Refills: 11 | Status: SHIPPED | OUTPATIENT
Start: 2023-08-31

## 2023-08-31 NOTE — TELEPHONE ENCOUNTER
Patient sent [employer] message. Going out of town for four days over the holiday weekend. Noticing more wheezing with heat. Requesting to have a prednisone prescription on hand should she need it when office is closed.    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 Luu:    OK to have Prednsione prescription on hand:   20mg daily x 5 days then 10mg daily x 5 days    Rest, hydrate, stay cool, and increase airway clearance.    Patient verbalized understanding and OK with plan.    BERRY Moon

## 2023-09-01 RX ORDER — PREDNISONE 10 MG/1
TABLET ORAL
Qty: 15 TABLET | Refills: 0 | Status: SHIPPED | OUTPATIENT
Start: 2023-09-01 | End: 2023-09-10

## 2023-10-06 DIAGNOSIS — J47.9 BRONCHIECTASIS WITHOUT COMPLICATION (H): ICD-10-CM

## 2023-10-09 RX ORDER — BUDESONIDE 1 MG/2ML
INHALANT ORAL
Qty: 120 ML | Refills: 1 | Status: SHIPPED | OUTPATIENT
Start: 2023-10-09 | End: 2023-12-15

## 2023-11-10 DIAGNOSIS — J47.9 BRONCHIECTASIS WITHOUT ACUTE EXACERBATION (H): ICD-10-CM

## 2023-11-10 RX ORDER — ALBUTEROL SULFATE 90 UG/1
AEROSOL, METERED RESPIRATORY (INHALATION)
Qty: 8.5 G | Refills: 11 | Status: SHIPPED | OUTPATIENT
Start: 2023-11-10

## 2023-11-20 ENCOUNTER — E-VISIT (OUTPATIENT)
Dept: FAMILY MEDICINE | Facility: CLINIC | Age: 38
End: 2023-11-20
Payer: COMMERCIAL

## 2023-11-20 DIAGNOSIS — J01.00 ACUTE MAXILLARY SINUSITIS, RECURRENCE NOT SPECIFIED: Primary | ICD-10-CM

## 2023-11-20 PROCEDURE — 99421 OL DIG E/M SVC 5-10 MIN: CPT | Performed by: PHYSICIAN ASSISTANT

## 2023-11-20 RX ORDER — SULFAMETHOXAZOLE/TRIMETHOPRIM 800-160 MG
1 TABLET ORAL 2 TIMES DAILY
Qty: 20 TABLET | Refills: 0 | Status: SHIPPED | OUTPATIENT
Start: 2023-11-20 | End: 2024-03-28

## 2023-11-20 RX ORDER — PREDNISONE 20 MG/1
40 TABLET ORAL DAILY
Qty: 10 TABLET | Refills: 0 | Status: SHIPPED | OUTPATIENT
Start: 2023-11-20 | End: 2023-11-25

## 2023-12-10 ENCOUNTER — MYC MEDICAL ADVICE (OUTPATIENT)
Dept: CARDIOLOGY | Facility: CLINIC | Age: 38
End: 2023-12-10
Payer: COMMERCIAL

## 2023-12-14 NOTE — TELEPHONE ENCOUNTER
Situation   Elevated blood pressure      Recs:     << Fely Denise MD Clerge, Ingrid RN  Phone Number: [phone]     I would have her check in with PCP about headaches. Although high BP can cause headaches, having bad headaches can cause elevated BP. Since she's so young without h/o high BP, I would have her checkout potential sources of headaches>>     Recommendations sent to patient via [employer].

## 2023-12-15 DIAGNOSIS — J47.9 BRONCHIECTASIS WITHOUT COMPLICATION (H): ICD-10-CM

## 2023-12-15 RX ORDER — BUDESONIDE 1 MG/2ML
INHALANT ORAL
Qty: 120 ML | Refills: 1 | Status: SHIPPED | OUTPATIENT
Start: 2023-12-15

## 2024-01-04 DIAGNOSIS — J47.1 BRONCHIECTASIS WITH ACUTE EXACERBATION (H): Primary | ICD-10-CM

## 2024-01-04 RX ORDER — PREDNISONE 20 MG/1
20 TABLET ORAL 2 TIMES DAILY
Qty: 10 TABLET | Refills: 0 | Status: SHIPPED | OUTPATIENT
Start: 2024-01-04 | End: 2024-01-09

## 2024-02-01 ENCOUNTER — E-VISIT (OUTPATIENT)
Dept: FAMILY MEDICINE | Facility: CLINIC | Age: 39
End: 2024-02-01
Payer: COMMERCIAL

## 2024-02-01 ENCOUNTER — MYC MEDICAL ADVICE (OUTPATIENT)
Dept: FAMILY MEDICINE | Facility: CLINIC | Age: 39
End: 2024-02-01

## 2024-02-01 DIAGNOSIS — E66.811 CLASS 1 OBESITY DUE TO EXCESS CALORIES WITHOUT SERIOUS COMORBIDITY WITH BODY MASS INDEX (BMI) OF 32.0 TO 32.9 IN ADULT: Primary | ICD-10-CM

## 2024-02-01 DIAGNOSIS — E66.09 CLASS 1 OBESITY DUE TO EXCESS CALORIES WITHOUT SERIOUS COMORBIDITY WITH BODY MASS INDEX (BMI) OF 32.0 TO 32.9 IN ADULT: Primary | ICD-10-CM

## 2024-02-01 PROCEDURE — 99207 PR NON-BILLABLE SERV PER CHARTING: CPT | Performed by: PHYSICIAN ASSISTANT

## 2024-02-01 ASSESSMENT — ANXIETY QUESTIONNAIRES
GAD7 TOTAL SCORE: 1
7. FEELING AFRAID AS IF SOMETHING AWFUL MIGHT HAPPEN: NOT AT ALL
5. BEING SO RESTLESS THAT IT IS HARD TO SIT STILL: NOT AT ALL
GAD7 TOTAL SCORE: 1
4. TROUBLE RELAXING: NOT AT ALL
2. NOT BEING ABLE TO STOP OR CONTROL WORRYING: SEVERAL DAYS
1. FEELING NERVOUS, ANXIOUS, OR ON EDGE: NOT AT ALL
3. WORRYING TOO MUCH ABOUT DIFFERENT THINGS: NOT AT ALL
6. BECOMING EASILY ANNOYED OR IRRITABLE: NOT AT ALL
GAD7 TOTAL SCORE: 1
7. FEELING AFRAID AS IF SOMETHING AWFUL MIGHT HAPPEN: NOT AT ALL
8. IF YOU CHECKED OFF ANY PROBLEMS, HOW DIFFICULT HAVE THESE MADE IT FOR YOU TO DO YOUR WORK, TAKE CARE OF THINGS AT HOME, OR GET ALONG WITH OTHER PEOPLE?: NOT DIFFICULT AT ALL

## 2024-02-01 ASSESSMENT — PATIENT HEALTH QUESTIONNAIRE - PHQ9
SUM OF ALL RESPONSES TO PHQ QUESTIONS 1-9: 1
SUM OF ALL RESPONSES TO PHQ QUESTIONS 1-9: 1
10. IF YOU CHECKED OFF ANY PROBLEMS, HOW DIFFICULT HAVE THESE PROBLEMS MADE IT FOR YOU TO DO YOUR WORK, TAKE CARE OF THINGS AT HOME, OR GET ALONG WITH OTHER PEOPLE: NOT DIFFICULT AT ALL

## 2024-02-02 ASSESSMENT — PATIENT HEALTH QUESTIONNAIRE - PHQ9: SUM OF ALL RESPONSES TO PHQ QUESTIONS 1-9: 1

## 2024-02-02 ASSESSMENT — ANXIETY QUESTIONNAIRES: GAD7 TOTAL SCORE: 1

## 2024-02-09 DIAGNOSIS — J45.50 SEVERE PERSISTENT ASTHMA (H): ICD-10-CM

## 2024-02-09 RX ORDER — DUPILUMAB 300 MG/2ML
300 INJECTION, SOLUTION SUBCUTANEOUS
Qty: 2 ML | Refills: 26 | Status: SHIPPED | OUTPATIENT
Start: 2024-02-09

## 2024-02-26 ENCOUNTER — MYC MEDICAL ADVICE (OUTPATIENT)
Dept: FAMILY MEDICINE | Facility: CLINIC | Age: 39
End: 2024-02-26
Payer: COMMERCIAL

## 2024-02-26 ENCOUNTER — E-VISIT (OUTPATIENT)
Dept: FAMILY MEDICINE | Facility: CLINIC | Age: 39
End: 2024-02-26
Payer: COMMERCIAL

## 2024-02-26 DIAGNOSIS — T75.3XXA MOTION SICKNESS, INITIAL ENCOUNTER: Primary | ICD-10-CM

## 2024-02-26 PROCEDURE — 99421 OL DIG E/M SVC 5-10 MIN: CPT | Performed by: PHYSICIAN ASSISTANT

## 2024-02-27 RX ORDER — ONDANSETRON 4 MG/1
4 TABLET, FILM COATED ORAL EVERY 8 HOURS PRN
Qty: 30 TABLET | Refills: 0 | Status: SHIPPED | OUTPATIENT
Start: 2024-02-27 | End: 2024-07-31

## 2024-02-27 RX ORDER — SCOLOPAMINE TRANSDERMAL SYSTEM 1 MG/1
1 PATCH, EXTENDED RELEASE TRANSDERMAL
Qty: 10 PATCH | Refills: 0 | Status: SHIPPED | OUTPATIENT
Start: 2024-02-27 | End: 2024-03-25

## 2024-03-01 DIAGNOSIS — J45.40 MODERATE PERSISTENT ASTHMA WITHOUT COMPLICATION: ICD-10-CM

## 2024-03-01 DIAGNOSIS — J47.9 ADULT BRONCHIECTASIS (H): ICD-10-CM

## 2024-03-01 RX ORDER — LEVOFLOXACIN 750 MG/1
TABLET, FILM COATED ORAL
Qty: 21 TABLET | Refills: 0 | OUTPATIENT
Start: 2024-03-01

## 2024-03-12 DIAGNOSIS — F32.1 MODERATE SINGLE CURRENT EPISODE OF MAJOR DEPRESSIVE DISORDER (H): ICD-10-CM

## 2024-03-12 DIAGNOSIS — F41.9 ANXIETY: ICD-10-CM

## 2024-03-12 RX ORDER — BUPROPION HYDROCHLORIDE 300 MG/1
300 TABLET ORAL EVERY MORNING
Qty: 30 TABLET | Refills: 2 | Status: SHIPPED | OUTPATIENT
Start: 2024-03-12 | End: 2024-05-07

## 2024-03-25 DIAGNOSIS — T75.3XXA MOTION SICKNESS, INITIAL ENCOUNTER: ICD-10-CM

## 2024-03-25 RX ORDER — SCOLOPAMINE TRANSDERMAL SYSTEM 1 MG/1
PATCH, EXTENDED RELEASE TRANSDERMAL
Qty: 10 PATCH | Refills: 0 | Status: SHIPPED | OUTPATIENT
Start: 2024-03-25 | End: 2024-03-28

## 2024-03-28 ENCOUNTER — OFFICE VISIT (OUTPATIENT)
Dept: PULMONOLOGY | Facility: CLINIC | Age: 39
End: 2024-03-28
Attending: INTERNAL MEDICINE
Payer: COMMERCIAL

## 2024-03-28 VITALS
HEART RATE: 105 BPM | DIASTOLIC BLOOD PRESSURE: 87 MMHG | BODY MASS INDEX: 32.4 KG/M2 | HEIGHT: 65 IN | OXYGEN SATURATION: 96 % | SYSTOLIC BLOOD PRESSURE: 120 MMHG | WEIGHT: 194.45 LBS

## 2024-03-28 DIAGNOSIS — J47.9 ADULT BRONCHIECTASIS (H): Primary | ICD-10-CM

## 2024-03-28 DIAGNOSIS — J47.1 BRONCHIECTASIS WITH ACUTE EXACERBATION (H): Primary | ICD-10-CM

## 2024-03-28 PROCEDURE — 94375 RESPIRATORY FLOW VOLUME LOOP: CPT | Performed by: INTERNAL MEDICINE

## 2024-03-28 PROCEDURE — 87070 CULTURE OTHR SPECIMN AEROBIC: CPT | Performed by: INTERNAL MEDICINE

## 2024-03-28 PROCEDURE — 99213 OFFICE O/P EST LOW 20 MIN: CPT | Performed by: INTERNAL MEDICINE

## 2024-03-28 PROCEDURE — 99214 OFFICE O/P EST MOD 30 MIN: CPT | Mod: 25 | Performed by: INTERNAL MEDICINE

## 2024-03-28 ASSESSMENT — ASTHMA QUESTIONNAIRES
QUESTION_3 LAST FOUR WEEKS HOW OFTEN DID YOUR ASTHMA SYMPTOMS (WHEEZING, COUGHING, SHORTNESS OF BREATH, CHEST TIGHTNESS OR PAIN) WAKE YOU UP AT NIGHT OR EARLIER THAN USUAL IN THE MORNING: NOT AT ALL
QUESTION_1 LAST FOUR WEEKS HOW MUCH OF THE TIME DID YOUR ASTHMA KEEP YOU FROM GETTING AS MUCH DONE AT WORK, SCHOOL OR AT HOME: NONE OF THE TIME
QUESTION_4 LAST FOUR WEEKS HOW OFTEN HAVE YOU USED YOUR RESCUE INHALER OR NEBULIZER MEDICATION (SUCH AS ALBUTEROL): NOT AT ALL
QUESTION_2 LAST FOUR WEEKS HOW OFTEN HAVE YOU HAD SHORTNESS OF BREATH: MORE THAN ONCE A DAY
ACT_TOTALSCORE: 20
QUESTION_5 LAST FOUR WEEKS HOW WOULD YOU RATE YOUR ASTHMA CONTROL: WELL CONTROLLED
ACT_TOTALSCORE: 20

## 2024-03-28 ASSESSMENT — PAIN SCALES - GENERAL: PAINLEVEL: NO PAIN (0)
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
## 2024-03-28 NOTE — PATIENT INSTRUCTIONS
Continue current medication, exercise, nebs and vest therapy.   Chest CT  We will get in touch with you when we have the culture and CT results with a plan.    [facility] Nurse line:  Ayesha   [phone]     [facility] Fax Number:     [phone]        Cystic Fibrosis Respiratory Therapists:   Linda Paige              [phone]          Yanci Valdez  [phone]

## 2024-03-28 NOTE — NURSING NOTE
Chief Complaint   Patient presents with    Follow Up     Bronch Follow up      Vitals were taken and medications were reconciled.     Zuleyka Ambrosio RMA  3:42 PM

## 2024-03-28 NOTE — PROGRESS NOTES
Michelle Epstein comes into clinic today at the request of Dr. Luu Ordering Provider for spirometry       This service provided today was under the supervising provider of the day Dr. Luu, who was available if needed.    Marie Phipps    
Statement Selected
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
## 2024-03-30 LAB — BACTERIA SPEC CULT: NORMAL

## 2024-03-31 LAB
EXPTIME-PRE: 6.3 SEC
FEF2575-%PRED-PRE: 87 %
FEF2575-PRE: 2.7 L/SEC
FEF2575-PRED: 3.09 L/SEC
FEFMAX-%PRED-PRE: 113 %
FEFMAX-PRE: 8.06 L/SEC
FEFMAX-PRED: 7.13 L/SEC
FEV1-%PRED-PRE: 98 %
FEV1-PRE: 2.88 L
FEV1FEV6-PRE: 79 %
FEV1FEV6-PRED: 84 %
FEV1FVC-PRE: 79 %
FEV1FVC-PRED: 83 %
FIFMAX-PRE: 5.02 L/SEC
FVC-%PRED-PRE: 104 %
FVC-PRE: 3.66 L
FVC-PRED: 3.51 L

## 2024-04-08 ENCOUNTER — ANCILLARY PROCEDURE (OUTPATIENT)
Dept: CT IMAGING | Facility: CLINIC | Age: 39
End: 2024-04-08
Attending: INTERNAL MEDICINE
Payer: COMMERCIAL

## 2024-04-08 DIAGNOSIS — J47.1 BRONCHIECTASIS WITH ACUTE EXACERBATION (H): ICD-10-CM

## 2024-04-08 PROCEDURE — 71250 CT THORAX DX C-: CPT | Performed by: RADIOLOGY

## 2024-04-09 DIAGNOSIS — J45.40 MODERATE PERSISTENT ASTHMA WITHOUT COMPLICATION: ICD-10-CM

## 2024-04-09 DIAGNOSIS — J47.9 BRONCHIECTASIS (H): Primary | ICD-10-CM

## 2024-04-20 ENCOUNTER — LAB (OUTPATIENT)
Dept: LAB | Facility: CLINIC | Age: 39
End: 2024-04-20
Payer: COMMERCIAL

## 2024-04-20 DIAGNOSIS — J47.9 BRONCHIECTASIS (H): ICD-10-CM

## 2024-04-20 DIAGNOSIS — J45.40 MODERATE PERSISTENT ASTHMA WITHOUT COMPLICATION: ICD-10-CM

## 2024-04-20 PROCEDURE — 87449 NOS EACH ORGANISM AG IA: CPT | Mod: 90

## 2024-04-20 PROCEDURE — 86003 ALLG SPEC IGE CRUDE XTRC EA: CPT

## 2024-04-20 PROCEDURE — 36415 COLL VENOUS BLD VENIPUNCTURE: CPT

## 2024-04-20 PROCEDURE — 87305 ASPERGILLUS AG IA: CPT | Mod: 90

## 2024-04-20 PROCEDURE — 86317 IMMUNOASSAY INFECTIOUS AGENT: CPT | Mod: 90

## 2024-04-20 PROCEDURE — 82785 ASSAY OF IGE: CPT

## 2024-04-20 PROCEDURE — 99000 SPECIMEN HANDLING OFFICE-LAB: CPT

## 2024-04-22 LAB
1,3 BETA GLUCAN SER-MCNC: <31 PG/ML
A FUMIGATUS IGE QN: <0.1 KU(A)/L
GALACTOMANNAN AG SERPL QL IA: NEGATIVE
GALACTOMANNAN AG SPEC IA-ACNC: 0.08
IGE SERPL-ACNC: 5 KU/L (ref 0–114)
OBSERVATION IMP: NEGATIVE

## 2024-04-23 LAB — A FUMIGATUS IGG SER-MCNC: 12.3 MCG/ML

## 2024-05-01 DIAGNOSIS — I47.10 SVT (SUPRAVENTRICULAR TACHYCARDIA) (H): ICD-10-CM

## 2024-05-02 LAB
GRAM STAIN RESULT: NORMAL
GRAM STAIN RESULT: NORMAL

## 2024-05-06 ENCOUNTER — E-VISIT (OUTPATIENT)
Dept: FAMILY MEDICINE | Facility: CLINIC | Age: 39
End: 2024-05-06
Payer: COMMERCIAL

## 2024-05-06 DIAGNOSIS — R53.83 FATIGUE, UNSPECIFIED TYPE: Primary | ICD-10-CM

## 2024-05-06 DIAGNOSIS — E78.1 HYPERTRIGLYCERIDEMIA: ICD-10-CM

## 2024-05-06 DIAGNOSIS — F41.9 ANXIETY: ICD-10-CM

## 2024-05-06 DIAGNOSIS — Z30.41 ENCOUNTER FOR SURVEILLANCE OF CONTRACEPTIVE PILLS: ICD-10-CM

## 2024-05-06 DIAGNOSIS — F32.1 MODERATE SINGLE CURRENT EPISODE OF MAJOR DEPRESSIVE DISORDER (H): ICD-10-CM

## 2024-05-06 PROCEDURE — 99421 OL DIG E/M SVC 5-10 MIN: CPT | Performed by: PHYSICIAN ASSISTANT

## 2024-05-06 ASSESSMENT — ANXIETY QUESTIONNAIRES
GAD7 TOTAL SCORE: 2
GAD7 TOTAL SCORE: 2
1. FEELING NERVOUS, ANXIOUS, OR ON EDGE: NOT AT ALL
3. WORRYING TOO MUCH ABOUT DIFFERENT THINGS: SEVERAL DAYS
2. NOT BEING ABLE TO STOP OR CONTROL WORRYING: NOT AT ALL
6. BECOMING EASILY ANNOYED OR IRRITABLE: SEVERAL DAYS
7. FEELING AFRAID AS IF SOMETHING AWFUL MIGHT HAPPEN: NOT AT ALL
4. TROUBLE RELAXING: NOT AT ALL
8. IF YOU CHECKED OFF ANY PROBLEMS, HOW DIFFICULT HAVE THESE MADE IT FOR YOU TO DO YOUR WORK, TAKE CARE OF THINGS AT HOME, OR GET ALONG WITH OTHER PEOPLE?: NOT DIFFICULT AT ALL
5. BEING SO RESTLESS THAT IT IS HARD TO SIT STILL: NOT AT ALL
GAD7 TOTAL SCORE: 2
7. FEELING AFRAID AS IF SOMETHING AWFUL MIGHT HAPPEN: NOT AT ALL

## 2024-05-07 RX ORDER — BUPROPION HYDROCHLORIDE 300 MG/1
300 TABLET ORAL EVERY MORNING
Qty: 90 TABLET | Refills: 0 | Status: SHIPPED | OUTPATIENT
Start: 2024-05-07 | End: 2024-08-07

## 2024-05-07 ASSESSMENT — PATIENT HEALTH QUESTIONNAIRE - PHQ9: SUM OF ALL RESPONSES TO PHQ QUESTIONS 1-9: 3

## 2024-05-07 ASSESSMENT — ANXIETY QUESTIONNAIRES: GAD7 TOTAL SCORE: 2

## 2024-05-10 ENCOUNTER — TELEPHONE (OUTPATIENT)
Dept: CARDIOLOGY | Facility: CLINIC | Age: 39
End: 2024-05-10
Payer: COMMERCIAL

## 2024-05-10 RX ORDER — VERAPAMIL HYDROCHLORIDE 120 MG/1
CAPSULE, EXTENDED RELEASE ORAL
Qty: 90 CAPSULE | Refills: 0 | Status: SHIPPED | OUTPATIENT
Start: 2024-05-10 | End: 2024-09-13

## 2024-05-10 NOTE — TELEPHONE ENCOUNTER
verapamil ER (VERELAN) 120 MG 24 hr capsule   90 capsule 3 2/7/2023       Last Office Visit : 2-7-2023  Future Office visit:  none  Calcium Channel Blockers Protocol  [id number]/09/2024 06:38 PM   Protocol Details Recent (12 mo) or future (90 days) visit within the authorizing provider's specialty       Labs completed on : 6-    ALT  0 - 50 U/L 17     Creatinine  0.51 - 0.95 mg/dL 0.94     Sowmya refill  Scheduling has been notified to contact the pt for appointment.

## 2024-05-10 NOTE — TELEPHONE ENCOUNTER
Upon chart review, patient was seen at [facility] cardiology in April with plan to follow-up with test results. It is unclear if patient will continue to follow-up with Dr Denise. Noted  attempted to reach patient, message was left to call back.

## 2024-05-10 NOTE — TELEPHONE ENCOUNTER
5/10 Left Voicemail (1st Attempt) and MyChart (1st Attempt) for the patient to call back and schedule the following:    Appointment type: Return EP    Provider: Camelia  Return date: next available  Specialty phone number: [phone] opt 1  Additional appointment(s) needed: n/a  Additonal Notes: n/a

## 2024-05-13 ENCOUNTER — TELEPHONE (OUTPATIENT)
Facility: CLINIC | Age: 39
End: 2024-05-13
Payer: COMMERCIAL

## 2024-05-13 NOTE — TELEPHONE ENCOUNTER
Prior Authorization Not Needed per Insurance    Medication: DUPIXENT 300 MG/2ML SC SOPN  Insurance Company: [employer] - Phone [phone] Fax [phone]  Expected CoPay: $    Pharmacy Filling the Rx: [facility] - [place], [place] - [address]  Pharmacy Notified: Not needed  Patient Notified: Not needed        Will check back after previous PA expires on  5/30/24

## 2024-05-15 NOTE — TELEPHONE ENCOUNTER
5/15/2024 10:20AM Gill Joseph  Left Voicemail (2nd Attempt) and Patient Contacted Pt's spouse, Claude contacted for the patient to call back and schedule the following:    Appointment type: Return EP  Provider: Dr. Denise ( or [facility])  Return date: Next available  Specialty phone number: [phone] option 1  Additional appointment(s) needed: NA  Additonal Notes: 5/15 LVM for Michelle and spoke w/ pt's spouse; Pt's spouse did not know if pt was coming back to [facility] or not, but would ask her. SANCHEZ Ruiz 5/15/2024 10:20AM

## 2024-05-17 ENCOUNTER — TELEPHONE (OUTPATIENT)
Dept: CARDIOLOGY | Facility: CLINIC | Age: 39
End: 2024-05-17
Payer: COMMERCIAL

## 2024-05-17 NOTE — TELEPHONE ENCOUNTER
5/17 Left Voicemail (2nd Attempt) for the patient to call back and schedule the following:    Appointment type: Return EP   Provider: Camelia  Return date: next available   Specialty phone number: [phone] opt 1  Additional appointment(s) needed: n/a  Additonal Notes: Pt can be seen in [facility] or [facility] for medication refill

## 2024-05-22 ENCOUNTER — E-VISIT (OUTPATIENT)
Dept: URGENT CARE | Facility: CLINIC | Age: 39
End: 2024-05-22
Payer: COMMERCIAL

## 2024-05-22 DIAGNOSIS — L03.031 PARONYCHIA OF TOE, RIGHT: Primary | ICD-10-CM

## 2024-05-22 PROCEDURE — 99421 OL DIG E/M SVC 5-10 MIN: CPT | Performed by: NURSE PRACTITIONER

## 2024-05-22 RX ORDER — CEPHALEXIN 500 MG/1
500 CAPSULE ORAL 2 TIMES DAILY
Qty: 14 CAPSULE | Refills: 0 | Status: SHIPPED | OUTPATIENT
Start: 2024-05-22 | End: 2024-05-29

## 2024-05-22 NOTE — PATIENT INSTRUCTIONS
Dear Michelle Epstein    This looks like a paronychia of the toe.  However you could also have an ingrown toenail infection.  I sent in a prescription for Keflex to take twice daily for a week.  Follow-up with podiatry if you do not get better.    Thanks for choosing us as your health care partner,    Mandy Richmond, CNP

## 2024-05-28 LAB — BACTERIA SPT CULT: NO GROWTH

## 2024-05-30 LAB — BACTERIA SPT CULT: NO GROWTH

## 2024-06-06 ENCOUNTER — MYC MEDICAL ADVICE (OUTPATIENT)
Dept: FAMILY MEDICINE | Facility: CLINIC | Age: 39
End: 2024-06-06

## 2024-06-06 ENCOUNTER — LAB (OUTPATIENT)
Dept: LAB | Facility: CLINIC | Age: 39
End: 2024-06-06
Payer: COMMERCIAL

## 2024-06-06 DIAGNOSIS — R53.83 FATIGUE, UNSPECIFIED TYPE: ICD-10-CM

## 2024-06-06 DIAGNOSIS — E78.1 HYPERTRIGLYCERIDEMIA: ICD-10-CM

## 2024-06-06 LAB
ALBUMIN SERPL BCG-MCNC: 4.1 G/DL (ref 3.5–5.2)
ALP SERPL-CCNC: 43 U/L (ref 40–150)
ALT SERPL W P-5'-P-CCNC: 15 U/L (ref 0–50)
ANION GAP SERPL CALCULATED.3IONS-SCNC: 9 MMOL/L (ref 7–15)
AST SERPL W P-5'-P-CCNC: 21 U/L (ref 0–45)
BASOPHILS # BLD AUTO: 0.1 10E3/UL (ref 0–0.2)
BASOPHILS NFR BLD AUTO: 1 %
BILIRUB SERPL-MCNC: 0.3 MG/DL
BUN SERPL-MCNC: 9.3 MG/DL (ref 6–20)
CALCIUM SERPL-MCNC: 9.1 MG/DL (ref 8.6–10)
CHLORIDE SERPL-SCNC: 109 MMOL/L (ref 98–107)
CHOLEST SERPL-MCNC: 212 MG/DL
CREAT SERPL-MCNC: 0.98 MG/DL (ref 0.51–0.95)
DEPRECATED HCO3 PLAS-SCNC: 24 MMOL/L (ref 22–29)
EGFRCR SERPLBLD CKD-EPI 2021: 74 ML/MIN/1.73M2
EOSINOPHIL # BLD AUTO: 0.7 10E3/UL (ref 0–0.7)
EOSINOPHIL NFR BLD AUTO: 6 %
ERYTHROCYTE [DISTWIDTH] IN BLOOD BY AUTOMATED COUNT: 13.5 % (ref 10–15)
FASTING STATUS PATIENT QL REPORTED: YES
FASTING STATUS PATIENT QL REPORTED: YES
FOLATE SERPL-MCNC: 14.1 NG/ML (ref 4.6–34.8)
GLUCOSE SERPL-MCNC: 86 MG/DL (ref 70–99)
HCT VFR BLD AUTO: 42 % (ref 35–47)
HDLC SERPL-MCNC: 57 MG/DL
HGB BLD-MCNC: 13.4 G/DL (ref 11.7–15.7)
IMM GRANULOCYTES # BLD: 0.1 10E3/UL
IMM GRANULOCYTES NFR BLD: 1 %
LDLC SERPL CALC-MCNC: 118 MG/DL
LYMPHOCYTES # BLD AUTO: 2.6 10E3/UL (ref 0.8–5.3)
LYMPHOCYTES NFR BLD AUTO: 24 %
MCH RBC QN AUTO: 28.6 PG (ref 26.5–33)
MCHC RBC AUTO-ENTMCNC: 31.9 G/DL (ref 31.5–36.5)
MCV RBC AUTO: 90 FL (ref 78–100)
MONOCYTES # BLD AUTO: 0.6 10E3/UL (ref 0–1.3)
MONOCYTES NFR BLD AUTO: 5 %
NEUTROPHILS # BLD AUTO: 7 10E3/UL (ref 1.6–8.3)
NEUTROPHILS NFR BLD AUTO: 64 %
NONHDLC SERPL-MCNC: 155 MG/DL
NRBC # BLD AUTO: 0 10E3/UL
NRBC BLD AUTO-RTO: 0 /100
PLATELET # BLD AUTO: 283 10E3/UL (ref 150–450)
POTASSIUM SERPL-SCNC: 4.5 MMOL/L (ref 3.4–5.3)
PROT SERPL-MCNC: 6.9 G/DL (ref 6.4–8.3)
RBC # BLD AUTO: 4.69 10E6/UL (ref 3.8–5.2)
SODIUM SERPL-SCNC: 142 MMOL/L (ref 135–145)
TRIGL SERPL-MCNC: 187 MG/DL
TSH SERPL DL<=0.005 MIU/L-ACNC: 2.53 UIU/ML (ref 0.3–4.2)
VIT B12 SERPL-MCNC: 1722 PG/ML (ref 232–1245)
WBC # BLD AUTO: 11 10E3/UL (ref 4–11)

## 2024-06-06 PROCEDURE — 80053 COMPREHEN METABOLIC PANEL: CPT

## 2024-06-06 PROCEDURE — 85025 COMPLETE CBC W/AUTO DIFF WBC: CPT

## 2024-06-06 PROCEDURE — 80061 LIPID PANEL: CPT

## 2024-06-06 PROCEDURE — 84443 ASSAY THYROID STIM HORMONE: CPT

## 2024-06-06 PROCEDURE — 82607 VITAMIN B-12: CPT

## 2024-06-06 PROCEDURE — 82306 VITAMIN D 25 HYDROXY: CPT

## 2024-06-06 PROCEDURE — 36415 COLL VENOUS BLD VENIPUNCTURE: CPT

## 2024-06-06 PROCEDURE — 82746 ASSAY OF FOLIC ACID SERUM: CPT

## 2024-06-06 NOTE — RESULT ENCOUNTER NOTE
Dear Michelle  Your triglycerides are higher than 9 months ago.  Your LDL cholesterol is higher as well.  What's changed?  Your electrolytes, blood sugar, kidney function and liver function were normal.     Your thyroid function was normal   Your blood counts and hemoglobin were normal.    Please call or MyChart my office with any questions or concerns.   Renita Heck, PAC

## 2024-06-06 NOTE — RESULT ENCOUNTER NOTE
Dear Michelle  Your folate was normal.  Please call or MyChart my office with any questions or concerns.   Renita Heck, PAC

## 2024-06-10 LAB
DEPRECATED CALCIDIOL+CALCIFEROL SERPL-MC: <58 UG/L (ref 20–75)
VITAMIN D2 SERPL-MCNC: <5 UG/L
VITAMIN D3 SERPL-MCNC: 53 UG/L

## 2024-06-10 NOTE — RESULT ENCOUNTER NOTE
Dear Michelle  Your vitamin level was normal.   Continue supplements as you have been taking.   Please call or MyChart my office with any questions or concerns.   Renita Heck, PAC

## 2024-06-18 ENCOUNTER — E-VISIT (OUTPATIENT)
Dept: FAMILY MEDICINE | Facility: CLINIC | Age: 39
End: 2024-06-18
Payer: COMMERCIAL

## 2024-06-18 DIAGNOSIS — J47.9 BRONCHIECTASIS WITHOUT ACUTE EXACERBATION (H): Primary | ICD-10-CM

## 2024-06-18 DIAGNOSIS — J01.00 ACUTE MAXILLARY SINUSITIS, RECURRENCE NOT SPECIFIED: ICD-10-CM

## 2024-06-18 LAB
ACID FAST STAIN (ARUP): NORMAL

## 2024-06-18 PROCEDURE — 99421 OL DIG E/M SVC 5-10 MIN: CPT | Performed by: PHYSICIAN ASSISTANT

## 2024-06-18 RX ORDER — SULFAMETHOXAZOLE/TRIMETHOPRIM 800-160 MG
1 TABLET ORAL 2 TIMES DAILY
Qty: 28 TABLET | Refills: 0 | Status: SHIPPED | OUTPATIENT
Start: 2024-06-18 | End: 2024-07-02

## 2024-06-18 RX ORDER — PREDNISONE 20 MG/1
40 TABLET ORAL DAILY
Qty: 10 TABLET | Refills: 0 | Status: SHIPPED | OUTPATIENT
Start: 2024-06-18 | End: 2024-06-23

## 2024-06-30 ENCOUNTER — HEALTH MAINTENANCE LETTER (OUTPATIENT)
Age: 39
End: 2024-06-30

## 2024-07-11 DIAGNOSIS — Z30.41 ENCOUNTER FOR SURVEILLANCE OF CONTRACEPTIVE PILLS: ICD-10-CM

## 2024-07-11 RX ORDER — DROSPIRENONE AND ETHINYL ESTRADIOL 0.02-3(28)
1 KIT ORAL DAILY
Qty: 84 TABLET | Refills: 0 | Status: SHIPPED | OUTPATIENT
Start: 2024-07-11 | End: 2024-09-13

## 2024-07-26 DIAGNOSIS — E78.1 HYPERTRIGLYCERIDEMIA: ICD-10-CM

## 2024-07-26 RX ORDER — FENOFIBRATE 145 MG/1
145 TABLET, COATED ORAL DAILY
Qty: 90 TABLET | Refills: 0 | Status: SHIPPED | OUTPATIENT
Start: 2024-07-26

## 2024-07-31 ENCOUNTER — MYC REFILL (OUTPATIENT)
Dept: FAMILY MEDICINE | Facility: CLINIC | Age: 39
End: 2024-07-31
Payer: COMMERCIAL

## 2024-07-31 ENCOUNTER — MYC MEDICAL ADVICE (OUTPATIENT)
Dept: PULMONOLOGY | Facility: CLINIC | Age: 39
End: 2024-07-31
Payer: COMMERCIAL

## 2024-07-31 DIAGNOSIS — J47.1 BRONCHIECTASIS WITH ACUTE EXACERBATION (H): Primary | ICD-10-CM

## 2024-07-31 DIAGNOSIS — T75.3XXA MOTION SICKNESS, INITIAL ENCOUNTER: ICD-10-CM

## 2024-07-31 RX ORDER — PREDNISONE 10 MG/1
TABLET ORAL
Qty: 26 TABLET | Refills: 0 | Status: SHIPPED | OUTPATIENT
Start: 2024-07-31 | End: 2024-08-16

## 2024-07-31 RX ORDER — ONDANSETRON 4 MG/1
4 TABLET, FILM COATED ORAL EVERY 8 HOURS PRN
Qty: 30 TABLET | Refills: 2 | Status: SHIPPED | OUTPATIENT
Start: 2024-07-31

## 2024-07-31 NOTE — CONFIDENTIAL NOTE
Patient's MyChart message discussed with Dr. Luu,    I am still in the middle of my course of levaquin and still having some increased wheezing and tightness. It has flared up the last couple days. Can you call in a longer taper of prednisone?     Dr. Luu ordered prednisone taper, prescription sent.    predniSONE (DELTASONE) 10 MG tablet     No   Sig - Route: Take 3 tablets (30 mg) by mouth daily for 4 days, THEN 2 tablets (20 mg) daily for 4 days, THEN 1 tablet (10 mg) daily for 4 days, THEN 0.5 tablets (5 mg) daily for 4 days. - Oral     Angie Galan, BSN, RN  RN Care Coordinator [facility]

## 2024-08-07 DIAGNOSIS — J47.1 BRONCHIECTASIS WITH ACUTE EXACERBATION (H): ICD-10-CM

## 2024-08-07 DIAGNOSIS — F41.9 ANXIETY: ICD-10-CM

## 2024-08-07 DIAGNOSIS — F32.1 MODERATE SINGLE CURRENT EPISODE OF MAJOR DEPRESSIVE DISORDER (H): ICD-10-CM

## 2024-08-07 RX ORDER — BUPROPION HYDROCHLORIDE 300 MG/1
300 TABLET ORAL EVERY MORNING
Qty: 30 TABLET | Refills: 0 | Status: SHIPPED | OUTPATIENT
Start: 2024-08-07 | End: 2024-09-13

## 2024-08-07 RX ORDER — LEVOFLOXACIN 750 MG/1
TABLET, FILM COATED ORAL
Qty: 21 TABLET | Refills: 0 | OUTPATIENT
Start: 2024-08-07

## 2024-08-14 ENCOUNTER — DOCUMENTATION ONLY (OUTPATIENT)
Dept: CARDIOLOGY | Facility: CLINIC | Age: 39
End: 2024-08-14
Payer: COMMERCIAL

## 2024-08-14 DIAGNOSIS — I47.10 SVT (SUPRAVENTRICULAR TACHYCARDIA) (H): ICD-10-CM

## 2024-08-14 RX ORDER — VERAPAMIL HYDROCHLORIDE 120 MG/1
CAPSULE, EXTENDED RELEASE ORAL
Qty: 90 CAPSULE | Refills: 0 | OUTPATIENT
Start: 2024-08-14

## 2024-08-14 NOTE — TELEPHONE ENCOUNTER
Medication Requested:  verapamil ER (VERELAN) 120 MG 24 hr capsule 90 capsule 0 5/10/2024 -- No   Sig: Take 1 capsule (120 mg) by mouth daily - Oral     ----------------------  Last Office Visit : 2/7/2023  [facility]    Future Office visit:  0  ----------------------      Refill decision: Refill pended and routed to the provider for review/determination due to the following criteria not met:     Pt not seen within past 12 months, no future appointment - per CE it looks like she may have established Cardiology care at [facility], saw Dr. Lr 4/1/24 at [facility].          Pass/Fail Protocol Criteria:  Calcium Channel Blockers Protocol  [id number]/14/2024 01:25 PM   Protocol Details Recent (12 mo) or future (90 days) visit within the authorizing provider's specialty

## 2024-08-14 NOTE — PROGRESS NOTES
Refill request received for Verapamil. Faxed to [facility] med refills team for further review.

## 2024-08-14 NOTE — TELEPHONE ENCOUNTER
Per previous notes scheduling attempted to reach patient several times, message left to call back. In reviewing EPIC noted patient may have had established with [facility] cardiology ( see clinic notes in CE from April 2024). Refill request E-faxed to Stefania Garrison/Dr Lr at [phone].

## 2024-09-13 ENCOUNTER — OFFICE VISIT (OUTPATIENT)
Dept: FAMILY MEDICINE | Facility: CLINIC | Age: 39
End: 2024-09-13
Payer: COMMERCIAL

## 2024-09-13 VITALS
WEIGHT: 204.6 LBS | BODY MASS INDEX: 34.09 KG/M2 | TEMPERATURE: 97.6 F | DIASTOLIC BLOOD PRESSURE: 84 MMHG | HEART RATE: 86 BPM | RESPIRATION RATE: 17 BRPM | SYSTOLIC BLOOD PRESSURE: 115 MMHG | OXYGEN SATURATION: 99 % | HEIGHT: 65 IN

## 2024-09-13 DIAGNOSIS — E66.09 CLASS 1 OBESITY DUE TO EXCESS CALORIES WITHOUT SERIOUS COMORBIDITY WITH BODY MASS INDEX (BMI) OF 32.0 TO 32.9 IN ADULT: ICD-10-CM

## 2024-09-13 DIAGNOSIS — Z00.00 ROUTINE GENERAL MEDICAL EXAMINATION AT A HEALTH CARE FACILITY: Primary | ICD-10-CM

## 2024-09-13 DIAGNOSIS — Z13.29 SCREENING FOR THYROID DISORDER: ICD-10-CM

## 2024-09-13 DIAGNOSIS — I47.10 SVT (SUPRAVENTRICULAR TACHYCARDIA) (H): ICD-10-CM

## 2024-09-13 DIAGNOSIS — Z13.1 SCREENING FOR DIABETES MELLITUS: ICD-10-CM

## 2024-09-13 DIAGNOSIS — E78.1 HYPERTRIGLYCERIDEMIA: ICD-10-CM

## 2024-09-13 DIAGNOSIS — F32.1 MODERATE SINGLE CURRENT EPISODE OF MAJOR DEPRESSIVE DISORDER (H): ICD-10-CM

## 2024-09-13 DIAGNOSIS — Z30.41 ENCOUNTER FOR SURVEILLANCE OF CONTRACEPTIVE PILLS: ICD-10-CM

## 2024-09-13 DIAGNOSIS — F41.9 ANXIETY: ICD-10-CM

## 2024-09-13 DIAGNOSIS — E66.811 CLASS 1 OBESITY DUE TO EXCESS CALORIES WITHOUT SERIOUS COMORBIDITY WITH BODY MASS INDEX (BMI) OF 32.0 TO 32.9 IN ADULT: ICD-10-CM

## 2024-09-13 DIAGNOSIS — J45.50 SEVERE PERSISTENT ASTHMA, UNSPECIFIED WHETHER COMPLICATED (H): ICD-10-CM

## 2024-09-13 DIAGNOSIS — H60.392 INFECTIVE OTITIS EXTERNA, LEFT: ICD-10-CM

## 2024-09-13 PROCEDURE — 90471 IMMUNIZATION ADMIN: CPT | Performed by: PHYSICIAN ASSISTANT

## 2024-09-13 PROCEDURE — 90656 IIV3 VACC NO PRSV 0.5 ML IM: CPT | Performed by: PHYSICIAN ASSISTANT

## 2024-09-13 PROCEDURE — 99214 OFFICE O/P EST MOD 30 MIN: CPT | Mod: 25 | Performed by: PHYSICIAN ASSISTANT

## 2024-09-13 PROCEDURE — 99396 PREV VISIT EST AGE 40-64: CPT | Mod: 25 | Performed by: PHYSICIAN ASSISTANT

## 2024-09-13 PROCEDURE — 96127 BRIEF EMOTIONAL/BEHAV ASSMT: CPT | Performed by: PHYSICIAN ASSISTANT

## 2024-09-13 RX ORDER — BUPROPION HYDROCHLORIDE 300 MG/1
300 TABLET ORAL EVERY MORNING
Qty: 90 TABLET | Refills: 1 | Status: SHIPPED | OUTPATIENT
Start: 2024-09-13

## 2024-09-13 RX ORDER — CIPROFLOXACIN AND DEXAMETHASONE 3; 1 MG/ML; MG/ML
4 SUSPENSION/ DROPS AURICULAR (OTIC) 2 TIMES DAILY
Qty: 7.5 ML | Refills: 0 | Status: SHIPPED | OUTPATIENT
Start: 2024-09-13 | End: 2024-09-20

## 2024-09-13 RX ORDER — DROSPIRENONE AND ETHINYL ESTRADIOL 0.02-3(28)
1 KIT ORAL DAILY
Qty: 84 TABLET | Refills: 3 | Status: SHIPPED | OUTPATIENT
Start: 2024-09-13

## 2024-09-13 RX ORDER — VERAPAMIL HYDROCHLORIDE 120 MG/1
CAPSULE, EXTENDED RELEASE ORAL
Qty: 90 CAPSULE | Refills: 3 | Status: SHIPPED | OUTPATIENT
Start: 2024-09-13

## 2024-09-13 ASSESSMENT — ANXIETY QUESTIONNAIRES
2. NOT BEING ABLE TO STOP OR CONTROL WORRYING: NOT AT ALL
7. FEELING AFRAID AS IF SOMETHING AWFUL MIGHT HAPPEN: NOT AT ALL
GAD7 TOTAL SCORE: 0
5. BEING SO RESTLESS THAT IT IS HARD TO SIT STILL: NOT AT ALL
1. FEELING NERVOUS, ANXIOUS, OR ON EDGE: NOT AT ALL
3. WORRYING TOO MUCH ABOUT DIFFERENT THINGS: NOT AT ALL
GAD7 TOTAL SCORE: 0
6. BECOMING EASILY ANNOYED OR IRRITABLE: NOT AT ALL

## 2024-09-13 ASSESSMENT — PAIN SCALES - GENERAL: PAINLEVEL: NO PAIN (0)

## 2024-09-13 ASSESSMENT — PATIENT HEALTH QUESTIONNAIRE - PHQ9
SUM OF ALL RESPONSES TO PHQ QUESTIONS 1-9: 1
5. POOR APPETITE OR OVEREATING: NOT AT ALL

## 2024-09-13 NOTE — PATIENT INSTRUCTIONS
Please schedule a fasting lab appointment (nothing to eat or drink 9 hours prior except water and/or your medications) at your earliest convenience.     I will notify you of lab results via [employer].    Keep appointment for mammogram   Continue medications as you have been taking.  Use ciprohc ear drops twice a day for 7 days  Follow up with us if no improvement in symptoms over the next 3 days Return urgently if any change in symptoms.    Let us know if there is an ENT provider you would like to see for referral  Follow up with us in 6 months for depression/anxiety- this can be a video visit       Patient Education   Preventive Care Advice   This is general advice given by our system to help you stay healthy. However, your care team may have specific advice just for you. Please talk to your care team about your preventive care needs.  Nutrition  Eat 5 or more servings of fruits and vegetables each day.  Try wheat bread, brown rice and whole grain pasta (instead of white bread, rice, and pasta).  Get enough calcium and vitamin D. Check the label on foods and aim for 100% of the RDA (recommended daily allowance).  Lifestyle  Exercise at least 150 minutes each week  (30 minutes a day, 5 days a week).  Do muscle strengthening activities 2 days a week. These help control your weight and prevent disease.  No smoking.  Wear sunscreen to prevent skin cancer.  Have a dental exam and cleaning every 6 months.  Yearly exams  See your health care team every year to talk about:  Any changes in your health.  Any medicines your care team has prescribed.  Preventive care, family planning, and ways to prevent chronic diseases.  Shots (vaccines)   HPV shots (up to age 26), if you've never had them before.  Hepatitis B shots (up to age 59), if you've never had them before.  COVID-19 shot: Get this shot when it's due.  Flu shot: Get a flu shot every year.  Tetanus shot: Get a tetanus shot every 10 years.  Pneumococcal, hepatitis A, and  RSV shots: Ask your care team if you need these based on your risk.  Shingles shot (for age 50 and up)  General health tests  Diabetes screening:  Starting at age 35, Get screened for diabetes at least every 3 years.  If you are younger than age 35, ask your care team if you should be screened for diabetes.  Cholesterol test: At age 39, start having a cholesterol test every 5 years, or more often if advised.  Bone density scan (DEXA): At age 50, ask your care team if you should have this scan for osteoporosis (brittle bones).  Hepatitis C: Get tested at least once in your life.  STIs (sexually transmitted infections)  Before age 24: Ask your care team if you should be screened for STIs.  After age 24: Get screened for STIs if you're at risk. You are at risk for STIs (including HIV) if:  You are sexually active with more than one person.  You don't use condoms every time.  You or a partner was diagnosed with a sexually transmitted infection.  If you are at risk for HIV, ask about PrEP medicine to prevent HIV.  Get tested for HIV at least once in your life, whether you are at risk for HIV or not.  Cancer screening tests  Cervical cancer screening: If you have a cervix, begin getting regular cervical cancer screening tests starting at age 21.  Breast cancer scan (mammogram): If you've ever had breasts, begin having regular mammograms starting at age 40. This is a scan to check for breast cancer.  Colon cancer screening: It is important to start screening for colon cancer at age 45.  Have a colonoscopy test every 10 years (or more often if you're at risk) Or, ask your provider about stool tests like a FIT test every year or Cologuard test every 3 years.  To learn more about your testing options, visit:   .  For help making a decision, visit:   https://bit.ly/vd69804.  Prostate cancer screening test: If you have a prostate, ask your care team if a prostate cancer screening test (PSA) at age 55 is right for you.  Lung  cancer screening: If you are a current or former smoker ages 50 to 80, ask your care team if ongoing lung cancer screenings are right for you.  For informational purposes only. Not to replace the advice of your health care provider. Copyright   2023 [facility] [employer]. All rights reserved. Clinically reviewed by the [facility] Transitions Program. [employer] [id number] - REV 01/24.

## 2024-09-13 NOTE — PROGRESS NOTES
Preventive Care Visit  [facility]  Renita Heck PA-C, Family Medicine  Sep 13, 2024      Assessment & Plan     Routine general medical examination at a health care facility  Benign exam except ear     Class 1 obesity due to excess calories without serious comorbidity with body mass index (BMI) of 32.0 to 32.9 in adult  Is getting injectable from online source- just started     Hypertriglyceridemia  Had been off tricor- recheck   - Lipid panel reflex to direct LDL Fasting  - Comprehensive metabolic panel (BMP + Alb, Alk Phos, ALT, AST, Total. Bili, TP)  - OFFICE/OUTPT VISIT,EST,LEVL IV    Screening for diabetes mellitus    - Comprehensive metabolic panel (BMP + Alb, Alk Phos, ALT, AST, Total. Bili, TP)    Screening for thyroid disorder    - TSH with free T4 reflex    Moderate single current episode of major depressive disorder (H)  Symptoms well controlled with current therapy- follow up with us in 6 months   - buPROPion (WELLBUTRIN XL) 300 MG 24 hr tablet  Dispense: 90 tablet; Refill: 1  - MI BEHAV ASSMT W/SCORE & DOCD/STAND INSTRUMENT  - OFFICE/OUTPT VISIT,EST,LEVL IV    Anxiety  Symptoms well controlled with current therapy - follow up with us in 6 months.   - buPROPion (WELLBUTRIN XL) 300 MG 24 hr tablet  Dispense: 90 tablet; Refill: 1  - MI BEHAV ASSMT W/SCORE & DOCD/STAND INSTRUMENT  - OFFICE/OUTPT VISIT,EST,LEVL IV    SVT (supraventricular tachycardia) (H24)  Symptoms well controlled with current therapy   - verapamil ER (VERELAN) 120 MG 24 hr capsule  Dispense: 90 capsule; Refill: 3  - OFFICE/OUTPT VISIT,EST,LEVL IV    Encounter for surveillance of contraceptive pills  Refilled oral contraceptive pill   - drospirenone-ethinyl estradiol (QUAN) 3-0.02 MG tablet  Dispense: 84 tablet; Refill: 3    Severe persistent asthma, unspecified whether complicated (H28)  Followed by pulmonary - has appointment next week     Infective otitis externa, left  Will treat with ciprodex.   -  "ciprofloxacin-dexAMETHasone (CIPRODEX) 0.3-0.1 % otic suspension  Dispense: 7.5 mL; Refill: 0  - OFFICE/OUTPT VISIT,ESTMIGUELL IV              BMI  Estimated body mass index is 34.58 kg/m  as calculated from the following:    Height as of this encounter: 1.638 m (5' 4.5\").    Weight as of this encounter: 92.8 kg (204 lb 9.6 oz).   Weight management plan: Discussed healthy diet and exercise guidelines    Counseling  Appropriate preventive services were addressed with this patient via screening, questionnaire, or discussion as appropriate for fall prevention, nutrition, physical activity, Tobacco-use cessation, social engagement, weight loss and cognition.  Checklist reviewing preventive services available has been given to the patient.  Reviewed patient's diet, addressing concerns and/or questions.   She is at risk for lack of exercise and has been provided with information to increase physical activity for the benefit of her well-being.       Please schedule a fasting lab appointment (nothing to eat or drink 9 hours prior except water and/or your medications) at your earliest convenience.     I will notify you of lab results via [employer].    Keep appointment for mammogram   Continue medications as you have been taking.  Use ciprohc ear drops twice a day for 7 days  Follow up with us if no improvement in symptoms over the next 3 days Return urgently if any change in symptoms.    Let us know if there is an ENT provider you would like to see for referral  Follow up with us in 6 months for depression/anxiety- this can be a video visit    Cristiano Martinez is a 39 year old, presenting for the following:  No chief complaint on file.        9/13/2024     2:06 PM   Additional Questions   Roomed by Devi   Accompanied by self        Health Care Directive  Patient does not have a Health Care Directive or Living Will: Discussed advance care planning with patient; however, patient declined at this time.    HPI  Patient well " known to me with history of anxiety, depression, hypertriglyceridemia, SVT recurrent otitis and sinusitis and severe asthma presents for annual exam. Complains of left ear pain and believes she has had an infection for a week and last four - five days plugged.   Not swimming  - improves with cipro steroid ear drops  No fever.  Chronic cough unchanged-sees pulmonary next week    Depression is well controlled with current therapy  of wellbutrin xL 300 mg daily   Verapamil kept SVT under control. Has had moments about to start but then stops   Last lipids checked had been out of tricor for a week     Just started 0.25 mg semaglutide 199$ monthly   Has had 3-4 sinus surgeries and feels like it is time for another one- needs to find new ENT   Nasal polyps and bronchiectasis  Is  a Nurse in transiitonal care         2/1/2024     1:13 PM 5/6/2024     1:10 PM 9/13/2024     2:35 PM   PHQ   PHQ-9 Total Score 1 3 1   Q9: Thoughts of better off dead/self-harm past 2 weeks Not at all Not at all Not at all          2/1/2024     1:13 PM 5/6/2024     1:10 PM 9/13/2024     2:35 PM   FELIPA-7 SCORE   Total Score 1 (minimal anxiety) 2 (minimal anxiety)    Total Score 1 2 0                9/10/2024   General Health   How would you rate your overall physical health? Good   Feel stress (tense, anxious, or unable to sleep) To some extent      (!) STRESS CONCERN      9/10/2024   Nutrition   Three or more servings of calcium each day? Yes   Diet: Regular (no restrictions)   How many servings of fruit and vegetables per day? (!) 2-3   How many sweetened beverages each day? (!) 2            9/10/2024   Exercise   Days per week of moderate/strenous exercise 2 days   Average minutes spent exercising at this level 30 min      (!) EXERCISE CONCERN      9/10/2024   Social Factors   Frequency of gathering with friends or relatives Once a week   Worry food won't last until get money to buy more No   Food not last or not have enough money for food? No    Do you have housing? (Housing is defined as stable permanent housing and does not include staying ouside in a car, in a tent, in an abandoned building, in an overnight shelter, or couch-surfing.) Yes   Are you worried about losing your housing? No   Lack of transportation? No   Unable to get utilities (heat,electricity)? No            9/10/2024   Dental   Dentist two times every year? Yes            8/28/2024   TB Screening   Were you born outside of the [place]? No            Today's PHQ-9 Score:       9/13/2024     2:35 PM   PHQ-9 SCORE   PHQ-9 Total Score 1           9/10/2024   Substance Use   Alcohol more than 3/day or more than 7/wk Not Applicable   Do you use any other substances recreationally? No        Social History     Tobacco Use    Smoking status: Never    Smokeless tobacco: Never    Tobacco comments:     lives in nonsmoking household    Vaping Use    Vaping status: Never Used   Substance Use Topics    Alcohol use: Not Currently     Comment: rare, not in PG    Drug use: No          Mammogram Screening - Mammogram every 1-2 years updated in Health Maintenance based on mutual decision making        9/10/2024   STI Screening   New sexual partner(s) since last STI/HIV test? No        History of abnormal Pap smear: No - age 30- 64 PAP with HPV every 5 years recommended        Latest Ref Rng & Units 8/18/2023     2:21 PM 1/27/2017     2:23 PM 1/27/2017     2:20 PM   PAP / HPV   PAP  Negative for Intraepithelial Lesion or Malignancy (NILM)      PAP (Historical)   NIL     HPV 16 DNA Negative Negative   Negative    HPV 18 DNA Negative Negative   Negative    Other HR HPV Negative Negative   Negative      ASCVD Risk   The 10-year ASCVD risk score (Elaine POLANCO, et al., 2019) is: 0.5%    Values used to calculate the score:      Age: 39 years      Sex: Female      Is Non- : No      Diabetic: No      Tobacco smoker: No      Systolic Blood Pressure: 120 mmHg      Is BP treated: No      HDL  Cholesterol: 57 mg/dL      Total Cholesterol: 212 mg/dL        9/10/2024   Contraception/Family Planning   Questions about contraception or family planning No           Reviewed and updated as needed this visit by Provider   Tobacco   Meds   Med Hx  Surg Hx  Fam Hx  Soc Hx Sexual Activity          BP Readings from Last 3 Encounters:   09/13/24 115/84   03/28/24 120/87   08/18/23 107/74    Wt Readings from Last 3 Encounters:   09/13/24 92.8 kg (204 lb 9.6 oz)   03/28/24 88.2 kg (194 lb 7.1 oz)   08/18/23 87.1 kg (192 lb)                  Patient Active Problem List   Diagnosis    CARDIOVASCULAR SCREENING; LDL GOAL LESS THAN 160    Adult bronchiectasis (H)    Dry eye syndrome    Thyroid nodule    Encounter for initial prescription of contraceptive pills    Moderate single current episode of major depressive disorder (H)    Tension headache    Anxiety    Severe persistent asthma, unspecified whether complicated (H28)    Eosinophilic asthma    Paroxysmal supraventricular tachycardia (H24)    Severe persistent asthma dependent on systemic steroids (H28)    Obesity due to excess calories without serious comorbidity, unspecified classification     Past Surgical History:   Procedure Laterality Date    ADENOIDECTOMY  1997    COSMETIC SURGERY  [id number]    Tummy tuck and breast lift    ENDOSCOPY      GENITOURINARY SURGERY  As a child    Ureter reimplantation    HC COLP CERVIX/UPPER VAGINA W BX CERVIX  2007    neg    NASAL/SINUS POLYPECTOMY  2015    OPTICAL TRACKING SYSTEM ENDOSCOPIC SINUS SURGERY Bilateral 01/11/2016    Procedure: OPTICAL TRACKING SYSTEM ENDOSCOPIC SINUS SURGERY;  Surgeon: Asmita Dallas MD;  Location: [facility] OR    REIMPLANT URETER CHILD  1989    bilateral    SINUS SURGERY  2009    x 2    THYROID BIOPSY  2012    neg    TONSILLECTOMY      TONSILLECTOMY  1997       Social History     Tobacco Use    Smoking status: Never    Smokeless tobacco: Never    Tobacco comments:     lives in nonsmoking household     Substance Use Topics    Alcohol use: Not Currently     Comment: rare, not in PG     Family History   Problem Relation Age of Onset    Neurologic Disorder Mother         brain tumor    Heart Disease Mother         SVT    Depression Mother     Migraines Mother     Hyperlipidemia Mother         high triglycerides    Anxiety Disorder Mother     Mental Illness Mother     Obesity Mother     Alcohol/Drug Father     Suicide Father     Cardiovascular Maternal Grandmother     Arthritis Maternal Grandmother     Diabetes Maternal Grandmother     Heart Disease Maternal Grandmother         AAA    Hypertension Maternal Grandmother     Cerebrovascular Disease Maternal Grandmother     Asthma Maternal Grandmother     Hyperlipidemia Maternal Grandmother         both high cholesterol and hypertriglyceremia     Hypertension Maternal Grandfather     Dementia Maternal Grandfather     Unknown/Adopted Paternal Grandmother     Unknown/Adopted Paternal Grandfather     Genitourinary Problems Daughter         kidney reflux    Neurologic Disorder Son 6        Seizures    Glaucoma No family hx of     Macular Degeneration No family hx of     Cancer No family hx of     Thyroid Disease No family hx of         ,         Current Outpatient Medications   Medication Sig Dispense Refill    albuterol (PROAIR HFA/PROVENTIL HFA/VENTOLIN HFA) 108 (90 Base) MCG/ACT inhaler INHALE TWO PUFFS BY MOUTH EVERY 6 HOURS AS NEEDED FOR WHEEZING OR FOR SHORTNESS OF BREATH Strength: 108 (90 Base) MCG/ACT 8.5 g 11    albuterol (PROVENTIL) (2.5 MG/3ML) 0.083% neb solution INHALE ONE VIAL VIA NEBULIZER FOUR TIMES A  mL 11    azithromycin (ZITHROMAX) 250 MG tablet Take 1 tablet (250 mg) by mouth daily 30 tablet 11    budesonide (PULMICORT) 1 MG/2ML neb solution INHALE ONE VIAL VIA NEBULIZER TWICE A  mL 1    budesonide-formoterol (SYMBICORT) 160-4.5 MCG/ACT Inhaler Inhale 2 puffs into the lungs 2 times daily 10.2 g 11    buPROPion (WELLBUTRIN XL) 300 MG 24 hr  tablet Take 1 tablet (300 mg) by mouth every morning. 90 tablet 1    ciprofloxacin-dexAMETHasone (CIPRODEX) 0.3-0.1 % otic suspension Place 4 drops Into the left ear 2 times daily for 7 days. 7.5 mL 0    drospirenone-ethinyl estradiol (QUAN) 3-0.02 MG tablet Take 1 tablet by mouth daily. 84 tablet 3    dupilumab (DUPIXENT) 300 MG/2ML prefilled pen Inject 2 mLs (300 mg) Subcutaneous every 14 days 2 mL 26    fenofibrate (TRICOR) 145 MG tablet TAKE ONE TABLET BY MOUTH ONCE DAILY 90 tablet 0    fluocinolone acetonide (DERMA SMOOTHE/FS BODY) 0.01 % external oil To ear for itching 1-2 times a day 50 mL 0    fluticasone (FLONASE) 50 MCG/ACT nasal spray Spray 2 sprays into both nostrils daily as needed for rhinitis or allergies 9.9 mL 11    hydrOXYzine (ATARAX) 25 MG tablet TAKE ONE TO TWO TABLETS BY MOUTH EVERY 6 HOURS AS NEEDED FOR ANXIETY OR ITCHING 30 tablet 3    methocarbamol (ROBAXIN) 750 MG tablet Take 1 tablet (750 mg) by mouth 3 times daily as needed for muscle spasms 60 tablet 1    ondansetron (ZOFRAN) 4 MG tablet Take 1 tablet (4 mg) by mouth every 8 hours as needed for nausea 30 tablet 2    verapamil ER (VERELAN) 120 MG 24 hr capsule Take 1 capsule (120 mg) by mouth daily - Oral 90 capsule 3         Review of Systems  CONSTITUTIONAL: has had weight gain- getting GLP 1 from online resource  Wt Readings from Last 4 Encounters:   09/13/24 92.8 kg (204 lb 9.6 oz)   03/28/24 88.2 kg (194 lb 7.1 oz)   08/18/23 87.1 kg (192 lb)   07/22/23 88 kg (194 lb)       INTEGUMENTARY/SKIN: NEGATIVE for worrisome rashes, moles or lesions  EYES: NEGATIVE for vision changes or irritation  ENT/MOUTH: see above   RESP:see above   BREAST: NEGATIVE for masses, tenderness or discharge  CV: NEGATIVE for chest pain, palpitations or peripheral edema  GI: NEGATIVE for nausea, abdominal pain, heartburn, or change in bowel habits  : NEGATIVE for frequency, dysuria, or hematuria  MUSCULOSKELETAL: NEGATIVE for significant arthralgias or  "myalgia  NEURO: NEGATIVE for weakness, dizziness or paresthesias  ENDOCRINE: NEGATIVE for temperature intolerance, skin/hair changes  HEME: NEGATIVE for bleeding problems  PSYCHIATRIC: NEGATIVE for changes in mood or affect     Objective    Exam  /84 (BP Location: Right arm, Patient Position: Sitting, Cuff Size: Adult Large)   Pulse 86   Temp 97.6  F (36.4  C) (Temporal)   Resp 17   Ht 1.638 m (5' 4.5\")   Wt 92.8 kg (204 lb 9.6 oz)   SpO2 99%   BMI 34.58 kg/m     Estimated body mass index is 34.58 kg/m  as calculated from the following:    Height as of this encounter: 1.638 m (5' 4.5\").    Weight as of this encounter: 92.8 kg (204 lb 9.6 oz).    Physical Exam  GENERAL: alert, no distress, and obese  EYES: Eyes grossly normal to inspection, PERRL and conjunctivae and sclerae normal  HENT: normal cephalic/atraumatic, right ear: normal: no effusions, no erythema, normal landmarks, left ear: red and boggy canal, oropharynx clear, and oral mucous membranes moist  NECK: no adenopathy, no asymmetry, masses, or scars  RESP: lungs clear to auscultation - no rales, rhonchi or wheezes  BREAST: normal without masses, tenderness or nipple discharge and no palpable axillary masses or adenopathy  CV: regular rate and rhythm, normal S1 S2, no S3 or S4, no murmur, click or rub, no peripheral edema  ABDOMEN: soft, nontender, no hepatosplenomegaly, no masses and bowel sounds normal   (female) w/bimanual: normal female external genitalia, normal urethral meatus, normal vaginal mucosa, and normal cervix/adnexa/uterus without masses or discharge  MS: no gross musculoskeletal defects noted, no edema  SKIN: no suspicious lesions or rashes  NEURO: Normal strength and tone, mentation intact and speech normal  PSYCH: mentation appears normal, affect normal/bright, judgement and insight intact, and appearance well groomed        Signed Electronically by: Renita Heck PA-C    "

## 2024-10-09 ENCOUNTER — ANCILLARY PROCEDURE (OUTPATIENT)
Dept: MAMMOGRAPHY | Facility: CLINIC | Age: 39
End: 2024-10-09
Attending: PHYSICIAN ASSISTANT
Payer: COMMERCIAL

## 2024-10-09 DIAGNOSIS — Z12.31 VISIT FOR SCREENING MAMMOGRAM: ICD-10-CM

## 2024-10-16 DIAGNOSIS — J47.9 BRONCHIECTASIS WITHOUT ACUTE EXACERBATION (H): ICD-10-CM

## 2024-10-17 RX ORDER — ALBUTEROL SULFATE 0.83 MG/ML
SOLUTION RESPIRATORY (INHALATION)
Qty: 360 ML | Refills: 11 | Status: SHIPPED | OUTPATIENT
Start: 2024-10-17

## 2024-10-22 ENCOUNTER — E-VISIT (OUTPATIENT)
Dept: FAMILY MEDICINE | Facility: CLINIC | Age: 39
End: 2024-10-22
Payer: COMMERCIAL

## 2024-10-22 DIAGNOSIS — J01.91 ACUTE RECURRENT SINUSITIS, UNSPECIFIED LOCATION: Primary | ICD-10-CM

## 2024-10-22 DIAGNOSIS — J47.9 BRONCHIECTASIS WITHOUT ACUTE EXACERBATION (H): ICD-10-CM

## 2024-10-22 PROCEDURE — 99421 OL DIG E/M SVC 5-10 MIN: CPT | Performed by: PHYSICIAN ASSISTANT

## 2024-10-22 RX ORDER — SULFAMETHOXAZOLE AND TRIMETHOPRIM 800; 160 MG/1; MG/1
1 TABLET ORAL 2 TIMES DAILY
Qty: 20 TABLET | Refills: 0 | Status: SHIPPED | OUTPATIENT
Start: 2024-10-22 | End: 2024-11-01

## 2024-10-22 RX ORDER — PREDNISONE 20 MG/1
40 TABLET ORAL DAILY
Qty: 10 TABLET | Refills: 0 | Status: SHIPPED | OUTPATIENT
Start: 2024-10-22 | End: 2024-10-27

## 2024-10-24 DIAGNOSIS — E78.1 HYPERTRIGLYCERIDEMIA: ICD-10-CM

## 2024-10-24 RX ORDER — FENOFIBRATE 145 MG/1
145 TABLET, COATED ORAL DAILY
Qty: 90 TABLET | Refills: 3 | Status: SHIPPED | OUTPATIENT
Start: 2024-10-24

## 2024-11-01 DIAGNOSIS — B37.31 YEAST INFECTION OF THE VAGINA: ICD-10-CM

## 2024-11-04 ENCOUNTER — MYC MEDICAL ADVICE (OUTPATIENT)
Dept: PULMONOLOGY | Facility: CLINIC | Age: 39
End: 2024-11-04
Payer: COMMERCIAL

## 2024-11-05 DIAGNOSIS — J45.50 SEVERE PERSISTENT ASTHMA WITHOUT COMPLICATION (H): ICD-10-CM

## 2024-11-05 DIAGNOSIS — J47.9 BRONCHIECTASIS WITHOUT COMPLICATION (H): ICD-10-CM

## 2024-11-05 RX ORDER — BUDESONIDE AND FORMOTEROL FUMARATE DIHYDRATE 160; 4.5 UG/1; UG/1
2 AEROSOL RESPIRATORY (INHALATION) 2 TIMES DAILY
Qty: 10.2 G | Refills: 11 | Status: SHIPPED | OUTPATIENT
Start: 2024-11-05

## 2024-11-05 RX ORDER — FLUCONAZOLE 150 MG/1
TABLET ORAL
Qty: 1 TABLET | Refills: 0 | Status: SHIPPED | OUTPATIENT
Start: 2024-11-05

## 2024-11-05 RX ORDER — AZITHROMYCIN 250 MG/1
250 TABLET, FILM COATED ORAL DAILY
Qty: 30 TABLET | Refills: 11 | Status: SHIPPED | OUTPATIENT
Start: 2024-11-05

## 2024-11-05 NOTE — TELEPHONE ENCOUNTER
Follow up call to patient, referencing [employer] message sent yesterday-       We received a refill request for your Diflucan/Fluconazole-     When Dr. Luu refilled it in May, he did  three refills for the Diflucan/Fluconazole. He did say if you are noticing worsening or more frequent yeast infections, he would like you to follow up with your PCP.   Have you already needed to use all your refills?       Patient stated she is changing insurance the end of the month, will have a lapse in coverage for a month so she is getting it refilled just in case she needs it while she does not have insurance.    Will send in one refill.    FABIO VasquezN, RN  RN Care Coordinator [facility]

## 2024-11-13 DIAGNOSIS — J47.9 BRONCHIECTASIS WITHOUT ACUTE EXACERBATION (H): ICD-10-CM

## 2024-11-14 RX ORDER — ALBUTEROL SULFATE 90 UG/1
INHALANT RESPIRATORY (INHALATION)
Qty: 8.5 G | Refills: 11 | Status: SHIPPED | OUTPATIENT
Start: 2024-11-14

## 2024-11-15 ENCOUNTER — MYC MEDICAL ADVICE (OUTPATIENT)
Dept: PULMONOLOGY | Facility: CLINIC | Age: 39
End: 2024-11-15
Payer: COMMERCIAL

## 2024-11-15 DIAGNOSIS — J47.9 BRONCHIECTASIS WITHOUT COMPLICATION (H): Primary | ICD-10-CM

## 2024-11-15 RX ORDER — PANTOPRAZOLE SODIUM 20 MG/1
20 TABLET, DELAYED RELEASE ORAL DAILY
Qty: 60 TABLET | Refills: 3 | Status: SHIPPED | OUTPATIENT
Start: 2024-11-15

## 2024-11-15 NOTE — TELEPHONE ENCOUNTER
Reviewed patient's refill request with Dr. Luu.  He approved to order pantoprazole 20mg daily as was previously prescribed.    Will update patient.    FABIO VasquezN, RN  RN Care Coordinator [facility]

## 2024-11-17 ENCOUNTER — E-VISIT (OUTPATIENT)
Dept: FAMILY MEDICINE | Facility: CLINIC | Age: 39
End: 2024-11-17
Payer: COMMERCIAL

## 2024-11-17 DIAGNOSIS — L21.9 SEBORRHEIC DERMATITIS: Primary | ICD-10-CM

## 2024-11-18 RX ORDER — KETOCONAZOLE 20 MG/ML
SHAMPOO, SUSPENSION TOPICAL DAILY PRN
Qty: 120 ML | Refills: 11 | Status: SHIPPED | OUTPATIENT
Start: 2024-11-18

## 2024-11-21 ENCOUNTER — OFFICE VISIT (OUTPATIENT)
Dept: PULMONOLOGY | Facility: CLINIC | Age: 39
End: 2024-11-21
Attending: INTERNAL MEDICINE
Payer: COMMERCIAL

## 2024-11-21 VITALS
HEIGHT: 65 IN | RESPIRATION RATE: 18 BRPM | HEART RATE: 97 BPM | WEIGHT: 205 LBS | OXYGEN SATURATION: 97 % | BODY MASS INDEX: 34.16 KG/M2 | DIASTOLIC BLOOD PRESSURE: 73 MMHG | SYSTOLIC BLOOD PRESSURE: 93 MMHG

## 2024-11-21 DIAGNOSIS — J47.1 BRONCHIECTASIS WITH ACUTE EXACERBATION (H): ICD-10-CM

## 2024-11-21 DIAGNOSIS — J45.50 SEVERE PERSISTENT ASTHMA, UNSPECIFIED WHETHER COMPLICATED (H): ICD-10-CM

## 2024-11-21 DIAGNOSIS — J47.9 ADULT BRONCHIECTASIS (H): Primary | ICD-10-CM

## 2024-11-21 LAB
EXPTIME-PRE: 7.2 SEC
FEF2575-%PRED-PRE: 82 %
FEF2575-PRE: 2.51 L/SEC
FEF2575-PRED: 3.03 L/SEC
FEFMAX-%PRED-PRE: 106 %
FEFMAX-PRE: 7.54 L/SEC
FEFMAX-PRED: 7.05 L/SEC
FEV1-%PRED-PRE: 97 %
FEV1-PRE: 2.79 L
FEV1FEV6-PRE: 79 %
FEV1FEV6-PRED: 84 %
FEV1FVC-PRE: 80 %
FEV1FVC-PRED: 83 %
FIFMAX-PRE: 3.59 L/SEC
FVC-%PRED-PRE: 101 %
FVC-PRE: 3.49 L
FVC-PRED: 3.45 L

## 2024-11-21 ASSESSMENT — ASTHMA QUESTIONNAIRES
ACT_TOTALSCORE: 21
QUESTION_1 LAST FOUR WEEKS HOW MUCH OF THE TIME DID YOUR ASTHMA KEEP YOU FROM GETTING AS MUCH DONE AT WORK, SCHOOL OR AT HOME: NONE OF THE TIME
QUESTION_3 LAST FOUR WEEKS HOW OFTEN DID YOUR ASTHMA SYMPTOMS (WHEEZING, COUGHING, SHORTNESS OF BREATH, CHEST TIGHTNESS OR PAIN) WAKE YOU UP AT NIGHT OR EARLIER THAN USUAL IN THE MORNING: NOT AT ALL
ACUTE_EXACERBATION_TODAY: NO
QUESTION_4 LAST FOUR WEEKS HOW OFTEN HAVE YOU USED YOUR RESCUE INHALER OR NEBULIZER MEDICATION (SUCH AS ALBUTEROL): ONE OR TWO TIMES PER DAY
ACT_TOTALSCORE: 21
QUESTION_2 LAST FOUR WEEKS HOW OFTEN HAVE YOU HAD SHORTNESS OF BREATH: NOT AT ALL
QUESTION_5 LAST FOUR WEEKS HOW WOULD YOU RATE YOUR ASTHMA CONTROL: WELL CONTROLLED

## 2024-11-21 ASSESSMENT — PAIN SCALES - GENERAL: PAINLEVEL_OUTOF10: NO PAIN (0)

## 2024-11-21 NOTE — NURSING NOTE
Chief Complaint   Patient presents with    Bronchiectasis     Follow up visit     Prosper Estrada, CMA CMA at 1:22 PM on 11/21/2024

## 2024-11-21 NOTE — PATIENT INSTRUCTIONS
Continue current medication, nebs and vest therapy.   Vigorous exercise every other day.    [facility] Nurse line:  Gi Moon [phone]     [facility] [place] Fax Number:     [phone]        [facility] Therapists:   Linda Paige              [phone]          Yanci Valdez  [phone]

## 2024-11-21 NOTE — PROGRESS NOTES
Michelle Epstein comes into clinic today at the request of Dr. Luu Ordering Provider for spirometry     Benson Kimble, RRT

## 2024-12-02 ENCOUNTER — ANCILLARY PROCEDURE (OUTPATIENT)
Dept: MAMMOGRAPHY | Facility: CLINIC | Age: 39
End: 2024-12-02
Attending: PHYSICIAN ASSISTANT
Payer: COMMERCIAL

## 2024-12-02 DIAGNOSIS — Z12.31 VISIT FOR SCREENING MAMMOGRAM: ICD-10-CM

## 2025-01-06 ENCOUNTER — MYC MEDICAL ADVICE (OUTPATIENT)
Dept: FAMILY MEDICINE | Facility: CLINIC | Age: 40
End: 2025-01-06
Payer: COMMERCIAL

## 2025-01-07 ENCOUNTER — E-VISIT (OUTPATIENT)
Dept: FAMILY MEDICINE | Facility: CLINIC | Age: 40
End: 2025-01-07
Payer: COMMERCIAL

## 2025-01-07 DIAGNOSIS — F32.1 MODERATE SINGLE CURRENT EPISODE OF MAJOR DEPRESSIVE DISORDER (H): ICD-10-CM

## 2025-01-07 DIAGNOSIS — F41.9 ANXIETY: ICD-10-CM

## 2025-01-07 RX ORDER — BUPROPION HYDROCHLORIDE 75 MG/1
75 TABLET ORAL 2 TIMES DAILY
Qty: 60 TABLET | Refills: 1 | Status: SHIPPED | OUTPATIENT
Start: 2025-01-07

## 2025-01-07 ASSESSMENT — ANXIETY QUESTIONNAIRES
4. TROUBLE RELAXING: NOT AT ALL
7. FEELING AFRAID AS IF SOMETHING AWFUL MIGHT HAPPEN: NOT AT ALL
IF YOU CHECKED OFF ANY PROBLEMS ON THIS QUESTIONNAIRE, HOW DIFFICULT HAVE THESE PROBLEMS MADE IT FOR YOU TO DO YOUR WORK, TAKE CARE OF THINGS AT HOME, OR GET ALONG WITH OTHER PEOPLE: NOT DIFFICULT AT ALL
5. BEING SO RESTLESS THAT IT IS HARD TO SIT STILL: NOT AT ALL
5. BEING SO RESTLESS THAT IT IS HARD TO SIT STILL: NOT AT ALL
GAD7 TOTAL SCORE: 1
GAD7 TOTAL SCORE: 1
4. TROUBLE RELAXING: NOT AT ALL
1. FEELING NERVOUS, ANXIOUS, OR ON EDGE: NOT AT ALL
3. WORRYING TOO MUCH ABOUT DIFFERENT THINGS: NOT AT ALL
IF YOU CHECKED OFF ANY PROBLEMS ON THIS QUESTIONNAIRE, HOW DIFFICULT HAVE THESE PROBLEMS MADE IT FOR YOU TO DO YOUR WORK, TAKE CARE OF THINGS AT HOME, OR GET ALONG WITH OTHER PEOPLE: NOT DIFFICULT AT ALL
8. IF YOU CHECKED OFF ANY PROBLEMS, HOW DIFFICULT HAVE THESE MADE IT FOR YOU TO DO YOUR WORK, TAKE CARE OF THINGS AT HOME, OR GET ALONG WITH OTHER PEOPLE?: NOT DIFFICULT AT ALL
6. BECOMING EASILY ANNOYED OR IRRITABLE: NOT AT ALL
7. FEELING AFRAID AS IF SOMETHING AWFUL MIGHT HAPPEN: NOT AT ALL
2. NOT BEING ABLE TO STOP OR CONTROL WORRYING: SEVERAL DAYS
1. FEELING NERVOUS, ANXIOUS, OR ON EDGE: NOT AT ALL
7. FEELING AFRAID AS IF SOMETHING AWFUL MIGHT HAPPEN: NOT AT ALL
GAD7 TOTAL SCORE: 2
6. BECOMING EASILY ANNOYED OR IRRITABLE: NOT AT ALL
2. NOT BEING ABLE TO STOP OR CONTROL WORRYING: SEVERAL DAYS
GAD7 TOTAL SCORE: 1
3. WORRYING TOO MUCH ABOUT DIFFERENT THINGS: SEVERAL DAYS

## 2025-01-07 ASSESSMENT — PATIENT HEALTH QUESTIONNAIRE - PHQ9
SUM OF ALL RESPONSES TO PHQ QUESTIONS 1-9: 3
SUM OF ALL RESPONSES TO PHQ QUESTIONS 1-9: 3
10. IF YOU CHECKED OFF ANY PROBLEMS, HOW DIFFICULT HAVE THESE PROBLEMS MADE IT FOR YOU TO DO YOUR WORK, TAKE CARE OF THINGS AT HOME, OR GET ALONG WITH OTHER PEOPLE: NOT DIFFICULT AT ALL

## 2025-01-26 ENCOUNTER — MYC REFILL (OUTPATIENT)
Dept: PULMONOLOGY | Facility: CLINIC | Age: 40
End: 2025-01-26
Payer: COMMERCIAL

## 2025-01-26 DIAGNOSIS — B37.31 YEAST INFECTION OF THE VAGINA: ICD-10-CM

## 2025-01-29 RX ORDER — FLUCONAZOLE 150 MG/1
150 TABLET ORAL ONCE
Qty: 1 TABLET | Refills: 3 | Status: SHIPPED | OUTPATIENT
Start: 2025-01-29 | End: 2025-01-29

## 2025-02-02 DIAGNOSIS — J47.9 BRONCHIECTASIS WITHOUT ACUTE EXACERBATION (H): ICD-10-CM

## 2025-02-03 RX ORDER — LEVOFLOXACIN 750 MG/1
750 TABLET, FILM COATED ORAL DAILY
Qty: 21 TABLET | Refills: 0 | OUTPATIENT
Start: 2025-02-03

## 2025-02-11 ENCOUNTER — TELEPHONE (OUTPATIENT)
Dept: FAMILY MEDICINE | Facility: CLINIC | Age: 40
End: 2025-02-11
Payer: COMMERCIAL

## 2025-02-11 DIAGNOSIS — T75.3XXA MOTION SICKNESS, INITIAL ENCOUNTER: ICD-10-CM

## 2025-02-14 ENCOUNTER — MYC MEDICAL ADVICE (OUTPATIENT)
Dept: FAMILY MEDICINE | Facility: CLINIC | Age: 40
End: 2025-02-14
Payer: COMMERCIAL

## 2025-02-14 NOTE — TELEPHONE ENCOUNTER
Mychart msg sent to pt informing her the need for an appt either video or e-visit.  Tali Ochoa CMA     Female

## 2025-02-19 ENCOUNTER — TELEPHONE (OUTPATIENT)
Facility: CLINIC | Age: 40
End: 2025-02-19
Payer: COMMERCIAL

## 2025-02-19 NOTE — TELEPHONE ENCOUNTER
[facility] Call Center    Phone Message    May a detailed message be left on voicemail: yes     Reason for Call: Medication Question or concern regarding medication   Prescription Clarification  Name of Medication: dupilumab (DUPIXENT) 300 MG/2ML prefilled pen   Prescribing Provider: Christian Lemos MD    What on the order needs clarification? Ayla is requesting a call back from the care team to clarify the quantity. Please follow-up.      Action Taken: Other: Pulm    Travel Screening: Not Applicable     Date of Service: 2/19/25

## 2025-02-19 NOTE — TELEPHONE ENCOUNTER
[phone] opt 1, opt 6    RN called back. The dupixent comes in a unbreakable box of 2. They wanted to make sure that is ok. RN confirmed.     Jessi Campbell, RN  Pulmonary Nurse Care Coordinator

## 2025-02-20 RX ORDER — SCOPOLAMINE 1 MG/3D
PATCH, EXTENDED RELEASE TRANSDERMAL
Qty: 10 PATCH | Refills: 0 | OUTPATIENT
Start: 2025-02-20

## 2025-02-24 ENCOUNTER — E-VISIT (OUTPATIENT)
Dept: FAMILY MEDICINE | Facility: CLINIC | Age: 40
End: 2025-02-24
Payer: COMMERCIAL

## 2025-02-24 DIAGNOSIS — L60.0 INGROWN TOENAIL OF RIGHT FOOT: Primary | ICD-10-CM

## 2025-02-25 RX ORDER — CEPHALEXIN 500 MG/1
500 CAPSULE ORAL 3 TIMES DAILY
Qty: 21 CAPSULE | Refills: 0 | Status: SHIPPED | OUTPATIENT
Start: 2025-02-25 | End: 2025-03-04

## 2025-03-24 ENCOUNTER — E-VISIT (OUTPATIENT)
Dept: FAMILY MEDICINE | Facility: CLINIC | Age: 40
End: 2025-03-24
Payer: COMMERCIAL

## 2025-03-24 DIAGNOSIS — H66.90 EAR INFECTION: Primary | ICD-10-CM

## 2025-03-24 PROCEDURE — 99207 PR NON-BILLABLE SERV PER CHARTING: CPT | Performed by: INTERNAL MEDICINE

## 2025-03-31 ENCOUNTER — E-VISIT (OUTPATIENT)
Dept: FAMILY MEDICINE | Facility: CLINIC | Age: 40
End: 2025-03-31
Payer: COMMERCIAL

## 2025-03-31 ENCOUNTER — MYC REFILL (OUTPATIENT)
Dept: FAMILY MEDICINE | Facility: CLINIC | Age: 40
End: 2025-03-31

## 2025-03-31 DIAGNOSIS — F41.9 ANXIETY: ICD-10-CM

## 2025-03-31 DIAGNOSIS — F32.1 MODERATE SINGLE CURRENT EPISODE OF MAJOR DEPRESSIVE DISORDER (H): ICD-10-CM

## 2025-03-31 RX ORDER — HYDROXYZINE HYDROCHLORIDE 25 MG/1
25-50 TABLET, FILM COATED ORAL EVERY 6 HOURS PRN
Qty: 90 TABLET | Refills: 1 | Status: SHIPPED | OUTPATIENT
Start: 2025-03-31

## 2025-03-31 RX ORDER — BUPROPION HYDROCHLORIDE 75 MG/1
75 TABLET ORAL 2 TIMES DAILY
Qty: 60 TABLET | Refills: 1 | Status: SHIPPED | OUTPATIENT
Start: 2025-03-31 | End: 2025-04-01

## 2025-03-31 ASSESSMENT — ANXIETY QUESTIONNAIRES
GAD7 TOTAL SCORE: 1
7. FEELING AFRAID AS IF SOMETHING AWFUL MIGHT HAPPEN: NOT AT ALL
1. FEELING NERVOUS, ANXIOUS, OR ON EDGE: NOT AT ALL
7. FEELING AFRAID AS IF SOMETHING AWFUL MIGHT HAPPEN: NOT AT ALL
GAD7 TOTAL SCORE: 1
6. BECOMING EASILY ANNOYED OR IRRITABLE: NOT AT ALL
3. WORRYING TOO MUCH ABOUT DIFFERENT THINGS: SEVERAL DAYS
IF YOU CHECKED OFF ANY PROBLEMS ON THIS QUESTIONNAIRE, HOW DIFFICULT HAVE THESE PROBLEMS MADE IT FOR YOU TO DO YOUR WORK, TAKE CARE OF THINGS AT HOME, OR GET ALONG WITH OTHER PEOPLE: NOT DIFFICULT AT ALL
GAD7 TOTAL SCORE: 1
8. IF YOU CHECKED OFF ANY PROBLEMS, HOW DIFFICULT HAVE THESE MADE IT FOR YOU TO DO YOUR WORK, TAKE CARE OF THINGS AT HOME, OR GET ALONG WITH OTHER PEOPLE?: NOT DIFFICULT AT ALL
5. BEING SO RESTLESS THAT IT IS HARD TO SIT STILL: NOT AT ALL
4. TROUBLE RELAXING: NOT AT ALL
2. NOT BEING ABLE TO STOP OR CONTROL WORRYING: NOT AT ALL

## 2025-03-31 ASSESSMENT — PATIENT HEALTH QUESTIONNAIRE - PHQ9
SUM OF ALL RESPONSES TO PHQ QUESTIONS 1-9: 1
10. IF YOU CHECKED OFF ANY PROBLEMS, HOW DIFFICULT HAVE THESE PROBLEMS MADE IT FOR YOU TO DO YOUR WORK, TAKE CARE OF THINGS AT HOME, OR GET ALONG WITH OTHER PEOPLE: NOT DIFFICULT AT ALL
SUM OF ALL RESPONSES TO PHQ QUESTIONS 1-9: 1

## 2025-03-31 NOTE — TELEPHONE ENCOUNTER
Provider E-Visit time total (minutes): 6 minutes                9/13/2024     2:35 PM 1/7/2025    11:40 AM 3/31/2025    10:58 AM   PHQ   PHQ-9 Total Score 1 3  1    Q9: Thoughts of better off dead/self-harm past 2 weeks Not at all Not at all Not at all       Patient-reported         1/7/2025    11:41 AM 1/7/2025    12:00 PM 3/31/2025    10:58 AM   FELIPA-7 SCORE   Total Score  1 (minimal anxiety) 1 (minimal anxiety)   Total Score 2  1  1        Patient-reported

## 2025-04-01 RX ORDER — BUPROPION HYDROCHLORIDE 75 MG/1
75 TABLET ORAL 2 TIMES DAILY
Qty: 180 TABLET | Refills: 1 | Status: SHIPPED | OUTPATIENT
Start: 2025-04-01

## 2025-04-01 RX ORDER — BUPROPION HYDROCHLORIDE 75 MG/1
75 TABLET ORAL 2 TIMES DAILY
Qty: 60 TABLET | Refills: 1 | Status: SHIPPED | OUTPATIENT
Start: 2025-04-01 | End: 2025-04-01

## 2025-04-01 ASSESSMENT — PATIENT HEALTH QUESTIONNAIRE - PHQ9: SUM OF ALL RESPONSES TO PHQ QUESTIONS 1-9: 1

## 2025-04-01 NOTE — PATIENT INSTRUCTIONS
Thank you for choosing us for your care. I have placed an order for your treatment:   Orders Placed This Encounter   Medications     buPROPion (WELLBUTRIN) 75 MG tablet     Sig: Take 1 tablet (75 mg) by mouth 2 times daily.     Dispense:  60 tablet     Refill:  1      View your full visit summary for details by clicking on the link below. Your pharmacist will able to address any questions you may have about the medication.      If you're not feeling better within 4-6 weeks please schedule an appointment.  You can schedule an appointment right here in [facility], or call [phone]  If the visit is for the same symptoms as your eVisit, we'll refund the cost of your eVisit if seen within seven days.

## 2025-04-16 ENCOUNTER — PATIENT OUTREACH (OUTPATIENT)
Dept: CARE COORDINATION | Facility: CLINIC | Age: 40
End: 2025-04-16
Payer: COMMERCIAL

## 2025-04-22 ENCOUNTER — MYC REFILL (OUTPATIENT)
Dept: FAMILY MEDICINE | Facility: CLINIC | Age: 40
End: 2025-04-22
Payer: COMMERCIAL

## 2025-04-22 DIAGNOSIS — T75.3XXA MOTION SICKNESS, INITIAL ENCOUNTER: ICD-10-CM

## 2025-04-22 RX ORDER — ONDANSETRON 4 MG/1
4 TABLET, FILM COATED ORAL EVERY 8 HOURS PRN
Qty: 30 TABLET | Refills: 2 | Status: SHIPPED | OUTPATIENT
Start: 2025-04-22

## 2025-07-12 DIAGNOSIS — Z30.41 ENCOUNTER FOR SURVEILLANCE OF CONTRACEPTIVE PILLS: ICD-10-CM

## 2025-07-14 RX ORDER — DROSPIRENONE AND ETHINYL ESTRADIOL 0.02-3(28)
1 KIT ORAL DAILY
Qty: 84 TABLET | Refills: 1 | Status: SHIPPED | OUTPATIENT
Start: 2025-07-14

## 2025-07-15 DIAGNOSIS — B37.31 YEAST INFECTION OF THE VAGINA: ICD-10-CM

## 2025-07-17 RX ORDER — FLUCONAZOLE 150 MG/1
TABLET ORAL
Qty: 1 TABLET | Refills: 3 | OUTPATIENT
Start: 2025-07-17

## 2025-07-22 DIAGNOSIS — B37.31 YEAST INFECTION OF THE VAGINA: ICD-10-CM

## 2025-07-28 RX ORDER — FLUCONAZOLE 150 MG/1
TABLET ORAL
Qty: 1 TABLET | Refills: 3 | Status: SHIPPED | OUTPATIENT
Start: 2025-07-28

## 2025-09-01 ENCOUNTER — PATIENT OUTREACH (OUTPATIENT)
Dept: CARE COORDINATION | Facility: CLINIC | Age: 40
End: 2025-09-01
Payer: COMMERCIAL